# Patient Record
Sex: FEMALE | Race: WHITE | Employment: OTHER | URBAN - METROPOLITAN AREA
[De-identification: names, ages, dates, MRNs, and addresses within clinical notes are randomized per-mention and may not be internally consistent; named-entity substitution may affect disease eponyms.]

---

## 2017-02-17 DIAGNOSIS — Z12.31 VISIT FOR SCREENING MAMMOGRAM: Primary | ICD-10-CM

## 2017-02-27 DIAGNOSIS — G43.109 MIGRAINE WITH AURA AND WITHOUT STATUS MIGRAINOSUS, NOT INTRACTABLE: ICD-10-CM

## 2017-02-27 DIAGNOSIS — M15.9 PRIMARY OSTEOARTHRITIS INVOLVING MULTIPLE JOINTS: ICD-10-CM

## 2017-02-28 RX ORDER — AMITRIPTYLINE HYDROCHLORIDE 25 MG/1
TABLET, FILM COATED ORAL
Qty: 180 TABLET | Refills: 0 | OUTPATIENT
Start: 2017-02-28

## 2017-02-28 RX ORDER — TRAMADOL HYDROCHLORIDE 50 MG/1
TABLET ORAL
Qty: 120 TABLET | Refills: 0 | OUTPATIENT
Start: 2017-02-28

## 2017-07-07 DIAGNOSIS — M15.9 PRIMARY OSTEOARTHRITIS INVOLVING MULTIPLE JOINTS: ICD-10-CM

## 2017-07-10 RX ORDER — GABAPENTIN 300 MG/1
CAPSULE ORAL
Qty: 270 CAPSULE | Refills: 0 | OUTPATIENT
Start: 2017-07-10

## 2017-07-10 RX ORDER — ERGOCALCIFEROL 1.25 MG/1
CAPSULE ORAL
Qty: 4 CAPSULE | Refills: 0 | OUTPATIENT
Start: 2017-07-10

## 2017-08-21 ENCOUNTER — TELEPHONE (OUTPATIENT)
Dept: ORTHOPEDIC SURGERY | Age: 57
End: 2017-08-21

## 2017-09-29 DIAGNOSIS — M15.9 PRIMARY OSTEOARTHRITIS INVOLVING MULTIPLE JOINTS: ICD-10-CM

## 2017-09-29 RX ORDER — GABAPENTIN 300 MG/1
CAPSULE ORAL
Qty: 270 CAPSULE | Refills: 0 | OUTPATIENT
Start: 2017-09-29

## 2017-10-02 DIAGNOSIS — M15.9 PRIMARY OSTEOARTHRITIS INVOLVING MULTIPLE JOINTS: ICD-10-CM

## 2017-10-03 RX ORDER — GABAPENTIN 300 MG/1
CAPSULE ORAL
Qty: 270 CAPSULE | Refills: 0 | OUTPATIENT
Start: 2017-10-03

## 2018-01-22 ENCOUNTER — TELEPHONE (OUTPATIENT)
Dept: CASE MANAGEMENT | Age: 58
End: 2018-01-22

## 2018-08-06 ENCOUNTER — HOSPITAL ENCOUNTER (OUTPATIENT)
Dept: NEUROLOGY | Age: 58
Discharge: HOME | End: 2018-08-06
Payer: MEDICARE

## 2018-08-06 PROCEDURE — 95909 NRV CNDJ TST 5-6 STUDIES: CPT

## 2018-08-06 PROCEDURE — 95886 MUSC TEST DONE W/N TEST COMP: CPT

## 2018-08-06 NOTE — PROCEDURES
Electrodiagnostic Report  [address] TUCKER Quinonez MD, Riley Barber DO, Janace Gibney Corinda Jew, MD  Phone: [phone]  Fax: [phone]    08/06/18    Fabio Whalen  61 y.o.  1960  [id number]  Referring Provider: Kari Jiménez CNP    Clinical Problem:  61 y.o with history of low back radiating to legs left more severe than right.  Onset 3 months ago, no known injury, PMH: no endocrine disease, +left foot surgery 3-4 yrs ago , right knee surgery  PE: reflexes trace, normal strength    EMG SUMMARY TABLE RIGHT/LEFT LOWER       Spontaneous     MUAP   Recruitment    Insertional Activity Fibrillation Potential Positive Sharp Waves   Fasiculation High Frequency Potentials   Amp Duration PPP Pattern   Vastus Medialis L2-4 Femoral normal none none none none normal normal normal normal   Anterior Tibialis L4,5 Deep Peroneal normal none none none none normal normal normal normal   Gluteus Medius L4-S1 Superior Gut normal none none none none normal normal normal normal   Peroneus Longus L5-S1 Sup Peroneal normal none none none none normal normal normal normal   Posterior Tibialis L5-S1 Tibial normal none none none none normal normal normal normal   Medial Gastroc S1,2 Tibial normal none none none none normal normal normal normal   Extensor Hallicis M8-D4 Peroneal normal none none none none normal normal normal normal   Extensor Dig Brevis L5-S1 Peroneal normal none none none none normal normal normal normal   LS Paraspinals Posterior Rami       normal none none none none normal normal normal normal       Nerve Conduction Studies     Nerve Sensory Distal Latency (msec) Sensory Distal Latency (msec) Amp uv Amp uv Motor Distal Latency (msec) Motor Distal Latency (msec) Amp uv Amp uv Motor NCV (m/sec) Motor NCV (m/sec)    Right Left Right Left Right Left Right Left Right Left   Sural 3.6 (n<4.2) 3.8 (n<4.2) 8  5         Peroneal     3.2 (n<5.5) 3.1

## 2018-08-20 ENCOUNTER — HOSPITAL ENCOUNTER (OUTPATIENT)
Dept: PHYSICAL THERAPY | Age: 58
Setting detail: THERAPIES SERIES
Discharge: HOME | End: 2018-08-20
Payer: MEDICARE

## 2018-08-20 ENCOUNTER — HOSPITAL ENCOUNTER (OUTPATIENT)
Age: 58
Discharge: HOME | End: 2018-08-20
Payer: MEDICARE

## 2018-08-20 ENCOUNTER — HOSPITAL ENCOUNTER (OUTPATIENT)
Dept: GENERAL RADIOLOGY | Age: 58
Discharge: HOME | End: 2018-08-20
Payer: MEDICARE

## 2018-08-20 DIAGNOSIS — M54.16 LUMBAR RADICULOPATHY: ICD-10-CM

## 2018-08-20 PROCEDURE — 97112 NEUROMUSCULAR REEDUCATION: CPT

## 2018-08-20 PROCEDURE — G8978 MOBILITY CURRENT STATUS: HCPCS

## 2018-08-20 PROCEDURE — 97161 PT EVAL LOW COMPLEX 20 MIN: CPT

## 2018-08-20 PROCEDURE — G8979 MOBILITY GOAL STATUS: HCPCS

## 2018-08-20 PROCEDURE — 72100 X-RAY EXAM L-S SPINE 2/3 VWS: CPT

## 2018-08-20 PROCEDURE — 97110 THERAPEUTIC EXERCISES: CPT

## 2018-08-20 NOTE — FLOWSHEET NOTE
Therapeutic Exercise and NMR EXR  [x] (36137) Provided verbal/tactile cueing for activities related to strengthening, flexibility, endurance, ROM for improvements in LE, proximal hip, and core control with self care, mobility, lifting, ambulation. [x] (38432) Provided verbal/tactile cueing for activities related to improving balance, coordination, kinesthetic sense, posture, motor skill, proprioception  to assist with LE, proximal hip, and core control in self care, mobility, lifting, ambulation and eccentric single leg control. Home Exercise Program:    [x] (16731) Reviewed/Progressed HEP activities related to strengthening, flexibility, endurance, ROM of core, proximal hip and LE for functional self-care, mobility, lifting and ambulation/stair navigation   [x] (41392)Reviewed/Progressed HEP activities related to improving balance, coordination, kinesthetic sense, posture, motor skill, proprioception of core, proximal hip and LE for self care, mobility, lifting, and ambulation/stair navigation      Manual Treatments:  PROM / STM / Oscillations-Mobs:  G-I, II, III, IV (PA's, Inf., Post.)  [x] (46524) Provided manual therapy to mobilize LE, proximal hip and/or LS spine soft tissue/joints for the purpose of modulating pain, promoting relaxation,  increasing ROM, reducing/eliminating soft tissue swelling/inflammation/restriction, improving soft tissue extensibility and allowing for proper ROM for normal function with self care, mobility, lifting and ambulation.      Modalities: n/a    Charges:  Timed Code Treatment Minutes: 40   Total Treatment Minutes: 60     [x] EVAL (LOW) 99206 (typically 20 minutes face-to-face)  [] EVAL (MOD) 67731 (typically 30 minutes face-to-face)  [] EVAL (HIGH) 72989 (typically 45 minutes face-to-face)  [] RE-EVAL     [x] PD(48192) x  2   [] Ionto  [x] NMR (90760) x  1   [] Vaso  [] Manual (67797) x       [] Mech Traction  [] ES (unattended):   [] Other:     GOALS:  Short Term Goals: To be achieved in: 2 weeks  1. Independent in HEP and progression per patient tolerance, in order to prevent re-injury. 2. Patient will have a decrease in pain to facilitate improvement in movement, function, and ADLs as indicated by Functional Deficits. Long Term Goals: To be achieved in: 10 weeks  1. Disability index score of 10% or less for the Modified Oswestry Low back pain to assist with reaching prior level of function. 2. Patient will demonstrate increased AROM to equal the opposite side bilaterally to allow for proper joint functioning as indicated by patients Functional Deficits. 3. Patient will demonstrate an increase in strength of Left LE = Right LE to allow for proper functional mobility as indicated by patients Functional Deficits. 4. Patient will return to all transfers, work activities, and functional activities without increased symptoms or restriction. 5. Patient will have 0/10 pain with ADL's.  6. Patient stated goal: to relieve pain and improve sitting duration tolerance    Goals that are underlined signify the goal has been accomplished. Progression Towards Functional goals:  [] Patient is progressing as expected towards functional goals listed. [] Progression is slowed due to complexities listed. [] Progression has been slowed due to co-morbidities.   [x] Plan just implemented, too soon to assess goals progression  [] Other:     ASSESSMENT:  See eval    Treatment/Activity Tolerance:   [x] Patient tolerated treatment well [] Patient limited by fatique  [] Patient limited by pain  [] Patient limited by other medical complications  [] Other:     Prognosis: [x] Good [] Fair  [] Poor    Patient Requires Follow-up: [x] Yes  [] No    PLAN: See eval  [] Continue per plan of care [] Alter current plan (see comments)  [x] Plan of care initiated [] Hold pending MD visit [] Discharge    Electronically signed by: Kem Shepherd PT

## 2018-08-20 NOTE — PLAN OF CARE
32%  Functional Limitation: Mobility: Walking and moving around  Mobility: Walking and Moving Around Current Status (): At least 20 percent but less than 40 percent impaired, limited or restricted  Mobility: Walking and Moving Around Goal Status (): At least 1 percent but less than 20 percent impaired, limited or restricted    Pain Scale: 4-5/10  Easing factors: pressure on PSIS area   Provocative factors: one position for too long of time     Type: []Constant   [x]Intermittent  []Radiating []Localized []other:     Numbness/Tingling: n/a    Functional Limitations/Impairments: [x]Sitting [x]Standing [x]Walking    [x]Squatting/bending  [x]Stairs           [x]ADL's  []Transfers []Sports/Recreation []Other:    Occupation/School:  Disabled secondary to Left Ankle     Living Status/Prior Level of Function: Independent with ADLs and IADLs.     OBJECTIVE:                   Supine Exam Normal Abnormal N/A Comments   Hip flexion       Abduction       ER       IR       ISAAC/James       Hip scour       SLR       Crossed SLR       Supine to sit       SI distraction/compression       Hip thrust                     Prone Exam Normal Abnormal N/A Comments   Prone knee bend       Prone hip IR       B Achilles reflex/Pheasant       PA/Spring       Prone Instability test       Sacral Spring/thrust                       ROM LEFT RIGHT Comments   Lumbar Flex 55 °      Lumbar Ext 18 °      Side Bend R 16 ° L 18 °      Rotation                    ROM LEFT RIGHT Comments   Hip Flexion 55 °  60 °     Hip Abd      Hip ER      Hip IR      Hip Extension      Knee Ext      Knee Flex      Hamstring Flex      Piriformis                    Strength LEFT RIGHT Comments   Multifidus 2-/5 2-/5    Transverse Ab 2-/5 2-/5    Hip Flexors 3+/5 4-/5    Hip Abductors 3+/5 4-/5    Hip Extensors 3+/5 4-/5                   Neural dynamic tension testing Normal Abnormal Comments   Slump Test  - Degree of knee flexion:       SLR  x     0-30 x []profession/work barriers  []past PT/medical experience  []other:  Justification:     Falls Risk Assessment (30 days):  [] Falls Risk assessed and no intervention required. [x] Falls Risk assessed and Patient requires intervention due to being higher risk   TUG score (>12s at risk):     [x] Falls education provided, including       G-Codes:  PT G-Codes  Functional Assessment Tool Used: Modified Oswestry Low Back  Score: 32%  Functional Limitation: Mobility: Walking and moving around  Mobility: Walking and Moving Around Current Status (): At least 20 percent but less than 40 percent impaired, limited or restricted  Mobility: Walking and Moving Around Goal Status ():  At least 1 percent but less than 20 percent impaired, limited or restricted    ASSESSMENT:   Functional Impairments:     []Noted lumbar/proximal hip hypomobility   []Noted lumbosacral and/or generalized hypermobility   [x]Decreased Lumbosacral/hip/LE functional ROM   [x]Decreased core/proximal hip strength and neuromuscular control    [x]Decreased LE functional strength    []Abnormal reflexes/sensation/myotomal/dermatomal deficits  [x]Reduced balance/proprioceptive control    []other:      Functional Activity Limitations (from functional questionnaire and intake)   [x]Reduced ability to tolerate prolonged functional positions   [x]Reduced ability or difficulty with changes of positions or transfers between positions   [x]Reduced ability to maintain good posture and demonstrate good body mechanics with sitting, bending, and lifting   [x]Reduced ability to sleep   [x] Reduced ability or tolerance with driving and/or computer work   [x]Reduced ability to perform lifting, reaching, carrying tasks   [x]Reduced ability to squat   [x]Reduced ability to forward bend   [x]Reduced ability to ambulate prolonged functional periods/distances/surfaces   [x]Reduced ability to ascend/descend stairs   []other:       Participation Restrictions   [x]Reduced

## 2018-08-22 ENCOUNTER — HOSPITAL ENCOUNTER (OUTPATIENT)
Dept: PHYSICAL THERAPY | Age: 58
Setting detail: THERAPIES SERIES
Discharge: HOME | End: 2018-08-22
Payer: MEDICARE

## 2018-08-22 PROCEDURE — 97112 NEUROMUSCULAR REEDUCATION: CPT | Performed by: PHYSICAL THERAPY ASSISTANT

## 2018-08-22 PROCEDURE — 97110 THERAPEUTIC EXERCISES: CPT | Performed by: PHYSICAL THERAPY ASSISTANT

## 2018-08-22 NOTE — FLOWSHEET NOTE
[facility] [facility]    Physical Therapy Daily Treatment Note  Date:  2018    Patient Name:  Kuldeep Carlisle    :  1960  MRN: [id number]  Medical/Treatment Diagnosis Information:  · Diagnosis: Lumbar Radiculopathy  · Treatment Diagnosis: H65.73  Insurance/Certification information:  [facility]  Physician Information:  Referring Practitioner: Desiree Harrison of care signed (Y/N):     Date of Patient follow up with Physician:     G-Code (if applicable):      Date G-Code Applied:  2018       Progress Note: [x]  Yes  []  No      Latex Allergy:  [x]NO      []YES    Preferred Language for Healthcare:   [x]English       []other:    Visit # Insurance Allowable   2 30 visits     Pain level:  4-5/10     SUBJECTIVE:  Patient reports that she did not do her exercises any since last visit.     OBJECTIVE: See eval  Observation:   Test measurements:    Patient educated on following:    RESTRICTIONS/PRECAUTIONS:     Exercises/Interventions:   Therapeutic Ex Wt/Sets/Reps/Hold Notes   Bike x5'    Eliptical          Slant Board 3x30\"    HR  x30         PPT 10x10\"    HL Lumbar Rotation 5\" x10    Supine Hamstring Stretch 3 x 20\"    Supine Piriformis Stretch 3 x 30\"    SL IT Band Stretch  3 x 30    Prone prop To elbows 10x10\"    Longsitting HS stretch 3x30\" R, L         Supine marches x20 R, L    HL Clamshells RTB     SL Clamshells GTB x20 R, L    SLR 2x10 R, L                                  Manual     Gr I-II mobs for tissue reactivity     PROM-all planes     GR III-IV mobs for arthrokinematics     Lumbar/long axis distraction     Thoracic PA mobs     PNF for strengthening     PNF for agonist/antagonist inhibition          Pt education/reminders 5' Pt education with HEP     Therapeutic Exercise and NMR EXR  [x] (41778) Provided verbal/tactile cueing for activities related to strengthening, flexibility, endurance, ROM for improvements in LE, proximal hip, and core control with self care, mobility, lifting, ambulation. [x] (75541) Provided verbal/tactile cueing for activities related to improving balance, coordination, kinesthetic sense, posture, motor skill, proprioception  to assist with LE, proximal hip, and core control in self care, mobility, lifting, ambulation and eccentric single leg control. Home Exercise Program:    [x] (32340) Reviewed/Progressed HEP activities related to strengthening, flexibility, endurance, ROM of core, proximal hip and LE for functional self-care, mobility, lifting and ambulation/stair navigation   [x] (12446)Reviewed/Progressed HEP activities related to improving balance, coordination, kinesthetic sense, posture, motor skill, proprioception of core, proximal hip and LE for self care, mobility, lifting, and ambulation/stair navigation      Manual Treatments:  PROM / STM / Oscillations-Mobs:  G-I, II, III, IV (PA's, Inf., Post.)  [x] (86595) Provided manual therapy to mobilize LE, proximal hip and/or LS spine soft tissue/joints for the purpose of modulating pain, promoting relaxation,  increasing ROM, reducing/eliminating soft tissue swelling/inflammation/restriction, improving soft tissue extensibility and allowing for proper ROM for normal function with self care, mobility, lifting and ambulation. Modalities: n/a    Charges:  Timed Code Treatment Minutes: 54'   Total Treatment Minutes: 54'     [] EVAL (LOW) 52763 (typically 20 minutes face-to-face)  [] EVAL (MOD) 83120 (typically 30 minutes face-to-face)  [] EVAL (HIGH) 90134 (typically 45 minutes face-to-face)  [] RE-EVAL     [x] AH(69627) x  2   [] Ionto  [x] NMR (82791) x  1   [] Vaso  [] Manual (11445) x       [] Mech Traction  [] ES (unattended):   [] Other:     GOALS:  Short Term Goals: To be achieved in: 2 weeks  1. Independent in HEP and progression per patient tolerance, in order to prevent re-injury.    2. Patient will have a decrease in pain to

## 2018-08-28 ENCOUNTER — HOSPITAL ENCOUNTER (OUTPATIENT)
Dept: PHYSICAL THERAPY | Age: 58
Setting detail: THERAPIES SERIES
Discharge: HOME | End: 2018-08-28
Payer: MEDICARE

## 2018-08-28 PROCEDURE — 97110 THERAPEUTIC EXERCISES: CPT

## 2018-08-28 PROCEDURE — 97112 NEUROMUSCULAR REEDUCATION: CPT

## 2018-08-28 NOTE — FLOWSHEET NOTE
[address] [facility]    Physical [facility] Daily Treatment Note  Date:  2018    Patient Name:  Malia Dewitt    :  1960  MRN: [id number]  Medical/Treatment Diagnosis Information:  · Diagnosis: Lumbar Radiculopathy  · Treatment Diagnosis: X84.96  Insurance/Certification information:  PT Insurance Information: [place]  Physician Information:  Referring Practitioner: Evelyn Celeste of care signed (Y/N):     Date of Patient follow up with Physician:     G-Code (if applicable):      Date G-Code Applied:  2018       Progress Note: [x]  Yes  []  No      Latex Allergy:  [x]NO      []YES    Preferred Language for Healthcare:   [x]English       []other:    Visit # Insurance Allowable   3 30 visits     Pain level:  8/10 while driving to therapy; - now     SUBJECTIVE:  Patient reports she has been doing some of her exercises.      OBJECTIVE: See eval  Observation:   Test measurements:    Patient educated on following:    RESTRICTIONS/PRECAUTIONS:     Exercises/Interventions:   Therapeutic Ex Wt/Sets/Reps/Hold Notes   Bike x5'    Eliptical          Slant Board 3x30\"    HR  x30         PPT 10x10\"    HL Lumbar Rotation 5\" x10    Supine Hamstring Stretch 3 x 20\"    Supine Piriformis Stretch 3 x 30\"    SL IT Band Stretch  3 x 30    Prone prop To elbows 10x10\"    Longsitting HS stretch 3x30\" R, L         Supine marches x20 R, L    HL Clamshells RTB     SL Clamshells GTB x20 R, L    SLR 2x10 R, L                                  Manual     Gr I-II mobs for tissue reactivity     PROM-all planes     GR III-IV mobs for arthrokinematics     Lumbar/long axis distraction     Thoracic PA mobs     PNF for strengthening     PNF for agonist/antagonist inhibition          Pt education/reminders 5' Pt education with HEP     Therapeutic Exercise and NMR EXR  [x] () Provided verbal/tactile cueing for activities related to strengthening, flexibility, endurance, ROM for

## 2018-08-29 ENCOUNTER — HOSPITAL ENCOUNTER (OUTPATIENT)
Dept: PHYSICAL THERAPY | Age: 58
Setting detail: THERAPIES SERIES
Discharge: HOME | End: 2018-08-29
Payer: MEDICARE

## 2018-08-29 PROCEDURE — 97112 NEUROMUSCULAR REEDUCATION: CPT | Performed by: PHYSICAL THERAPY ASSISTANT

## 2018-08-29 PROCEDURE — 97140 MANUAL THERAPY 1/> REGIONS: CPT | Performed by: PHYSICAL THERAPY ASSISTANT

## 2018-08-29 PROCEDURE — 97110 THERAPEUTIC EXERCISES: CPT | Performed by: PHYSICAL THERAPY ASSISTANT

## 2018-08-29 NOTE — FLOWSHEET NOTE
[facility] [facility]    Physical Therapy Daily Treatment Note  Date:  2018    Patient Name:  Luis Mary    :  1960  MRN: [id number]  Medical/Treatment Diagnosis Information:  · Diagnosis: Lumbar Radiculopathy  · Treatment Diagnosis: N93.07  Insurance/Certification information:  PT [facility]  Physician Information:  Referring Practitioner: Ancelmo Jj of care signed (Y/N):     Date of Patient follow up with Physician:     G-Code (if applicable):      Date G-Code Applied:  2018       Progress Note: [x]  Yes  []  No      Latex Allergy:  [x]NO      []YES    Preferred Language for Healthcare:   [x]English       []other:    Visit # Insurance Allowable   4 30 visits     Pain level:  8/10 while driving to therapy; 0- now     SUBJECTIVE:  Patients chief complaint continues to be prolonged sitting in her recliner and while driving. Patient also admits that she has been doing increased oneyda activity and standing more - finds relief with lumbar extension and prone props.     OBJECTIVE: See eval  Observation:   Test measurements:    Patient educated on following:    RESTRICTIONS/PRECAUTIONS:     Exercises/Interventions:   Therapeutic Ex Wt/Sets/Reps/Hold Notes   Bike x5'    Eliptical          Slant Board 3x30\"    HR  x30         PPT 10x10\"    HL Lumbar Rotation 5\" x15    Supine Hamstring Stretch 3 x 20\"    Supine Piriformis Stretch 5x20\"    SL IT Band Stretch  3 x 30    Prone prop To elbows 10x10\"    Longsitting HS stretch 3x30\" R, L         Supine marches x20 R, L    HL Clamshells RTB     SL Clamshells GTB x20 R, L    SLR 2x10 R, L                                  Manual     Gr I-II mobs for tissue reactivity     PROM-all planes     GR III-IV mobs for arthrokinematics     Lumbar/long axis distraction 10x10\" HL position   Thoracic PA mobs     PNF for strengthening     PNF for agonist/antagonist inhibition          Pt education/reminders 5' Pt education with HEP     Therapeutic Exercise and NMR EXR  [x] (66721) Provided verbal/tactile cueing for activities related to strengthening, flexibility, endurance, ROM for improvements in LE, proximal hip, and core control with self care, mobility, lifting, ambulation. [x] (19552) Provided verbal/tactile cueing for activities related to improving balance, coordination, kinesthetic sense, posture, motor skill, proprioception  to assist with LE, proximal hip, and core control in self care, mobility, lifting, ambulation and eccentric single leg control. Home Exercise Program:    [x] (73531) Reviewed/Progressed HEP activities related to strengthening, flexibility, endurance, ROM of core, proximal hip and LE for functional self-care, mobility, lifting and ambulation/stair navigation   [x] (34921)Reviewed/Progressed HEP activities related to improving balance, coordination, kinesthetic sense, posture, motor skill, proprioception of core, proximal hip and LE for self care, mobility, lifting, and ambulation/stair navigation      Manual Treatments:  PROM / STM / Oscillations-Mobs:  G-I, II, III, IV (PA's, Inf., Post.)  [x] (19712) Provided manual therapy to mobilize LE, proximal hip and/or LS spine soft tissue/joints for the purpose of modulating pain, promoting relaxation,  increasing ROM, reducing/eliminating soft tissue swelling/inflammation/restriction, improving soft tissue extensibility and allowing for proper ROM for normal function with self care, mobility, lifting and ambulation.      Modalities: n/a    Charges:  Timed Code Treatment Minutes: 54'   Total Treatment Minutes: 54'     [] EVAL (LOW) 22343 (typically 20 minutes face-to-face)  [] EVAL (MOD) 12904 (typically 30 minutes face-to-face)  [] EVAL (HIGH) 54338 (typically 45 minutes face-to-face)  [] RE-EVAL     [x] UL(81391) x  1   [] Ionto  [x] NMR (42848) x  1   [] Vaso  [x] Manual (11179) x  1    [] Mech Traction  [] ES (unattended):   [] Other:     GOALS:  Short Term Goals: To be achieved in: 2 weeks  1. Independent in HEP and progression per patient tolerance, in order to prevent re-injury. 2. Patient will have a decrease in pain to facilitate improvement in movement, function, and ADLs as indicated by Functional Deficits. Long Term Goals: To be achieved in: 10 weeks  1. Disability index score of 10% or less for the Modified Oswestry Low back pain to assist with reaching prior level of function. 2. Patient will demonstrate increased AROM to equal the opposite side bilaterally to allow for proper joint functioning as indicated by patients Functional Deficits. 3. Patient will demonstrate an increase in strength of Left LE = Right LE to allow for proper functional mobility as indicated by patients Functional Deficits. 4. Patient will return to all transfers, work activities, and functional activities without increased symptoms or restriction. 5. Patient will have 0/10 pain with ADL's.  6. Patient stated goal: to relieve pain and improve sitting duration tolerance    Goals that are underlined signify the goal has been accomplished. Progression Towards Functional goals:  [x] Patient is progressing as expected towards functional goals listed. [] Progression is slowed due to complexities listed. [] Progression has been slowed due to co-morbidities.   [] Plan just implemented, too soon to assess goals progression  [] Other:     ASSESSMENT:  See eval    Treatment/Activity Tolerance:   [x] Patient tolerated treatment well [] Patient limited by fatique  [] Patient limited by pain  [] Patient limited by other medical complications  [] Other:     Prognosis: [x] Good [] Fair  [] Poor    Patient Requires Follow-up: [x] Yes  [] No    PLAN: See eval  [x] Continue per plan of care [] Alter current plan (see comments)  [] Plan of care initiated [] Hold pending MD visit [] Discharge    Electronically signed by: Mariela Urias PTA

## 2018-09-05 ENCOUNTER — HOSPITAL ENCOUNTER (OUTPATIENT)
Dept: PHYSICAL THERAPY | Age: 58
Setting detail: THERAPIES SERIES
Discharge: HOME | End: 2018-09-05
Payer: MEDICARE

## 2018-09-05 PROCEDURE — 97140 MANUAL THERAPY 1/> REGIONS: CPT

## 2018-09-05 PROCEDURE — 97112 NEUROMUSCULAR REEDUCATION: CPT

## 2018-09-05 PROCEDURE — 97110 THERAPEUTIC EXERCISES: CPT

## 2018-09-05 NOTE — FLOWSHEET NOTE
strengthening, flexibility, endurance, ROM for improvements in LE, proximal hip, and core control with self care, mobility, lifting, ambulation. [x] (66660) Provided verbal/tactile cueing for activities related to improving balance, coordination, kinesthetic sense, posture, motor skill, proprioception  to assist with LE, proximal hip, and core control in self care, mobility, lifting, ambulation and eccentric single leg control. Home Exercise Program:    [x] (78891) Reviewed/Progressed HEP activities related to strengthening, flexibility, endurance, ROM of core, proximal hip and LE for functional self-care, mobility, lifting and ambulation/stair navigation   [x] (18043)Reviewed/Progressed HEP activities related to improving balance, coordination, kinesthetic sense, posture, motor skill, proprioception of core, proximal hip and LE for self care, mobility, lifting, and ambulation/stair navigation      Manual Treatments:  PROM / STM / Oscillations-Mobs:  G-I, II, III, IV (PA's, Inf., Post.)  [x] (00039) Provided manual therapy to mobilize LE, proximal hip and/or LS spine soft tissue/joints for the purpose of modulating pain, promoting relaxation,  increasing ROM, reducing/eliminating soft tissue swelling/inflammation/restriction, improving soft tissue extensibility and allowing for proper ROM for normal function with self care, mobility, lifting and ambulation. Modalities: n/a    Charges:  Timed Code Treatment Minutes: 40'   Total Treatment Minutes: 54'     [] EVAL (LOW) 43830 (typically 20 minutes face-to-face)  [] EVAL (MOD) 83985 (typically 30 minutes face-to-face)  [] EVAL (HIGH) 58516 (typically 45 minutes face-to-face)  [] RE-EVAL     [x] OU(26529) x  1   [] Ionto  [x] NMR (30905) x  1   [] Vaso  [x] Manual (82954) x  1    [] Mech Traction  [] ES (unattended):   [] Other:     GOALS:  Short Term Goals: To be achieved in: 2 weeks  1.  Independent in HEP and progression per patient tolerance, in order to

## 2018-09-10 ENCOUNTER — HOSPITAL ENCOUNTER (OUTPATIENT)
Dept: PHYSICAL THERAPY | Age: 58
Setting detail: THERAPIES SERIES
Discharge: HOME | End: 2018-09-10
Payer: MEDICARE

## 2018-09-10 PROCEDURE — 97140 MANUAL THERAPY 1/> REGIONS: CPT | Performed by: PHYSICAL THERAPY ASSISTANT

## 2018-09-10 PROCEDURE — 97110 THERAPEUTIC EXERCISES: CPT | Performed by: PHYSICAL THERAPY ASSISTANT

## 2018-09-10 PROCEDURE — 97112 NEUROMUSCULAR REEDUCATION: CPT | Performed by: PHYSICAL THERAPY ASSISTANT

## 2018-09-12 ENCOUNTER — HOSPITAL ENCOUNTER (OUTPATIENT)
Dept: PHYSICAL THERAPY | Age: 58
Setting detail: THERAPIES SERIES
Discharge: HOME | End: 2018-09-12
Payer: MEDICARE

## 2018-09-12 PROCEDURE — G8978 MOBILITY CURRENT STATUS: HCPCS | Performed by: PHYSICAL THERAPY ASSISTANT

## 2018-09-12 PROCEDURE — 97112 NEUROMUSCULAR REEDUCATION: CPT | Performed by: PHYSICAL THERAPY ASSISTANT

## 2018-09-12 PROCEDURE — 97110 THERAPEUTIC EXERCISES: CPT | Performed by: PHYSICAL THERAPY ASSISTANT

## 2018-09-12 PROCEDURE — G8979 MOBILITY GOAL STATUS: HCPCS | Performed by: PHYSICAL THERAPY ASSISTANT

## 2018-09-12 PROCEDURE — 97140 MANUAL THERAPY 1/> REGIONS: CPT | Performed by: PHYSICAL THERAPY ASSISTANT

## 2018-09-17 ENCOUNTER — HOSPITAL ENCOUNTER (OUTPATIENT)
Dept: PHYSICAL THERAPY | Age: 58
Setting detail: THERAPIES SERIES
Discharge: HOME | End: 2018-09-17
Payer: MEDICARE

## 2018-09-17 PROCEDURE — 97112 NEUROMUSCULAR REEDUCATION: CPT

## 2018-09-17 PROCEDURE — 97110 THERAPEUTIC EXERCISES: CPT

## 2018-09-17 NOTE — FLOWSHEET NOTE
agonist/antagonist inhibition          Pt education/reminders 5' Pt education with HEP     Therapeutic Exercise and NMR EXR  [x] (93846) Provided verbal/tactile cueing for activities related to strengthening, flexibility, endurance, ROM for improvements in LE, proximal hip, and core control with self care, mobility, lifting, ambulation. [x] (12309) Provided verbal/tactile cueing for activities related to improving balance, coordination, kinesthetic sense, posture, motor skill, proprioception  to assist with LE, proximal hip, and core control in self care, mobility, lifting, ambulation and eccentric single leg control. Home Exercise Program:    [x] (92082) Reviewed/Progressed HEP activities related to strengthening, flexibility, endurance, ROM of core, proximal hip and LE for functional self-care, mobility, lifting and ambulation/stair navigation   [x] (77150)Reviewed/Progressed HEP activities related to improving balance, coordination, kinesthetic sense, posture, motor skill, proprioception of core, proximal hip and LE for self care, mobility, lifting, and ambulation/stair navigation      Manual Treatments:  PROM / STM / Oscillations-Mobs:  G-I, II, III, IV (PA's, Inf., Post.)  [x] (38625) Provided manual therapy to mobilize LE, proximal hip and/or LS spine soft tissue/joints for the purpose of modulating pain, promoting relaxation,  increasing ROM, reducing/eliminating soft tissue swelling/inflammation/restriction, improving soft tissue extensibility and allowing for proper ROM for normal function with self care, mobility, lifting and ambulation.      Modalities: n/a    Charges:  Timed Code Treatment Minutes: 40'   Total Treatment Minutes: 54'     [] EVAL (LOW) 87558 (typically 20 minutes face-to-face)  [] EVAL (MOD) 48304 (typically 30 minutes face-to-face)  [] EVAL (HIGH) 28418 (typically 45 minutes face-to-face)  [] RE-EVAL     [x] ZL(95904) x  2   [] Ionto  [x] NMR (84327) x  1   [] Vaso  [] Manual (17213)

## 2018-09-19 ENCOUNTER — HOSPITAL ENCOUNTER (OUTPATIENT)
Dept: PHYSICAL THERAPY | Age: 58
Setting detail: THERAPIES SERIES
Discharge: HOME | End: 2018-09-19
Payer: MEDICARE

## 2018-09-19 PROCEDURE — 97110 THERAPEUTIC EXERCISES: CPT | Performed by: PHYSICAL THERAPY ASSISTANT

## 2018-09-19 PROCEDURE — 97140 MANUAL THERAPY 1/> REGIONS: CPT | Performed by: PHYSICAL THERAPY ASSISTANT

## 2018-09-19 PROCEDURE — 97112 NEUROMUSCULAR REEDUCATION: CPT | Performed by: PHYSICAL THERAPY ASSISTANT

## 2018-09-19 NOTE — FLOWSHEET NOTE
[address] [facility])    Physical Therapy Daily Treatment Note  Date:  2018    Patient Name:  Shirley Merritt    :  1960  MRN: [id number]  Medical/Treatment Diagnosis Information:  · Diagnosis: Lumbar Radiculopathy  · Treatment Diagnosis: V86.10  Insurance/Certification information:  [facility] [facility]  Physician Information:  Referring Practitioner: Yas Fret of care signed (Y/N):     Date of Patient follow up with Physician:     G-Code (if applicable):      Date G-Code Applied:  2018       Progress Note: [x]  Yes  []  No      Latex Allergy:  [x]NO      []YES    Preferred Language for Healthcare:   [x]English       []other:    Visit # Insurance Allowable   9 30 visits     Pain level:  /10 current; 7-8/10 over the weekend     SUBJECTIVE:  Patient reports that she has been really busy over last few days and unable to perform her HEP. Still complains of back pain with sitting and driving.      OBJECTIVE:   Observation:   Test measurements:    Patient educated on following:    RESTRICTIONS/PRECAUTIONS:     Exercises/Interventions:   Therapeutic Ex Wt/Sets/Reps/Hold Notes   Bike x5'    Eliptical          Slant Board 3x30\"    HR  x30    Standing marches 3# x20 R, L    Leg Press 60# x30    KAPIL abd 45# x20 R, L    FSU 4\" x20 R, L              PPT 10x10\"    HL Lumbar Rotation 5\" x15    Supine Hamstring Stretch 3 x 20\"    Supine Piriformis Stretch 3 x 20\"    SL IT Band Stretch  3 x 30\"    Prone prop To hands 10x10\"    Longsitting HS stretch 3x30\" R, L         Supine marches 2# x20 R, L    HL Clamshells 3 way BTB  x20 ea    SL Clamshells BTB x20 R, L    SLR with PPT 3x10 R, L         LAQ with ADD squeeze 3# 2x15 R, L                        Manual     Gr I-II mobs for tissue reactivity     PROM-all planes     GR III-IV mobs for arthrokinematics     Prone SI mobs/sacral distraction  Prone quad stretch x8'    3x30\"    Lumbar/long axis RE-EVAL     [x] RV(88738) x  1   [] Ionto  [x] NMR (79302) x  1   [] Vaso  [x] Manual (34023) x  1    [] Mech Traction  [] ES (unattended):   [] Other:     GOALS:  Short Term Goals: To be achieved in: 2 weeks  1. Independent in HEP and progression per patient tolerance, in order to prevent re-injury. 2. Patient will have a decrease in pain to facilitate improvement in movement, function, and ADLs as indicated by Functional Deficits. Long Term Goals: To be achieved in: 10 weeks  1. Disability index score of 10% or less for the Modified Oswestry Low back pain to assist with reaching prior level of function. 2. Patient will demonstrate increased AROM to equal the opposite side bilaterally to allow for proper joint functioning as indicated by patients Functional Deficits. 3. Patient will demonstrate an increase in strength of Left LE = Right LE to allow for proper functional mobility as indicated by patients Functional Deficits. 4. Patient will return to all transfers, work activities, and functional activities without increased symptoms or restriction. 5. Patient will have 0/10 pain with ADL's.  6. Patient stated goal: to relieve pain and improve sitting duration tolerance    Goals that are underlined signify the goal has been accomplished. Progression Towards Functional goals:  [x] Patient is progressing as expected towards functional goals listed. [] Progression is slowed due to complexities listed. [] Progression has been slowed due to co-morbidities.   [] Plan just implemented, too soon to assess goals progression  [] Other:     ASSESSMENT:  See eval    Treatment/Activity Tolerance:   [x] Patient tolerated treatment well [] Patient limited by fatique  [] Patient limited by pain  [] Patient limited by other medical complications  [] Other:     Prognosis: [x] Good [] Fair  [] Poor    Patient Requires Follow-up: [x] Yes  [] No    PLAN: See eval  [x] Continue per plan of care [] Alter current plan

## 2018-09-19 NOTE — FLOWSHEET NOTE
[address], [address] [facility], [address] Rohan Obrien  Phone: [phone]  Fax [phone]      ATHLETIC TRAINING AREA ACTIVITY SHEET  Date:  2018    Patient Name:  Luis Mary    :  1960  MRN: [id number]  Restrictions/Precautions:    Medical/Treatment Diagnosis Information:  ·   Diagnosis: Lumbar Radiculopathy  · Treatment Diagnosis: M54.16  ·    Physician Information:   Referring Practitioner: Josh Jones Post-op  8 wks  12 wks 16 wks 20 wks   24 wks                            Activity Log                                                  DOS/DOI:                                                    Date:     ATC communication Goes to see the Dr on Oct 15th. Bike    Elliptical    Treadmill    Airdyne        Gastroc stretch    Soleus stretch    Hamstring stretch    ITB stretch    Hip Flexor stretch    Quad stretch    Adductor stretch        Weight Shifting sp                              fp                              tp    Lateral walking (with/w/o TB)        Balance: PEP/Lisa board                   SLS          Star excursion load/explode          Extremity reach UE/LE        Leg Press Eddie. 80# x30                     Ecc.                      Inv. Calf Press Eddie. Ecc.                        Inv.        KAPIL   Flex               ABd 45# x 30 R,L              ADd              TKE               Ext        Steps Up 4\" x 30 R,L               Up and Over               Down               Lateral               Rotation        Squats  mini                  wall                 BOS[facility]         Lunges:  Lunge to Balance                   Balance to Lunge                   Walking        Knee Extension Bilat. Ecc.                               Inv. Hamstring Curls Bilat.                                Ecc.                               Inv.        Soleus Press

## 2018-09-25 ENCOUNTER — HOSPITAL ENCOUNTER (OUTPATIENT)
Dept: PHYSICAL THERAPY | Age: 58
Setting detail: THERAPIES SERIES
Discharge: HOME | End: 2018-09-25
Payer: MEDICARE

## 2018-09-25 PROCEDURE — 97110 THERAPEUTIC EXERCISES: CPT

## 2018-09-25 PROCEDURE — G8979 MOBILITY GOAL STATUS: HCPCS

## 2018-09-25 PROCEDURE — 97112 NEUROMUSCULAR REEDUCATION: CPT

## 2018-09-25 PROCEDURE — G8978 MOBILITY CURRENT STATUS: HCPCS

## 2018-09-25 NOTE — FLOWSHEET NOTE
lifting and ambulation. Modalities: n/a    Charges:  Timed Code Treatment Minutes: 61'   Total Treatment Minutes: 61'     [] EVAL (LOW) 64280 (typically 20 minutes face-to-face)  [] EVAL (MOD) 28572 (typically 30 minutes face-to-face)  [] EVAL (HIGH) 84310 (typically 45 minutes face-to-face)  [] RE-EVAL     [x] DZ(65959) x  2   [] Ionto  [x] NMR (44658) x  2   [] Vaso  [] Manual (63619) x       [] Mech Traction  [] ES (unattended):   [] Other:     GOALS:  Short Term Goals: To be achieved in: 2 weeks  1. Independent in HEP and progression per patient tolerance, in order to prevent re-injury. 2. Patient will have a decrease in pain to facilitate improvement in movement, function, and ADLs as indicated by Functional Deficits. Long Term Goals: To be achieved in: 10 weeks  1. Disability index score of 10% or less for the Modified Oswestry Low back pain to assist with reaching prior level of function. 2. Patient will demonstrate increased AROM to equal the opposite side bilaterally to allow for proper joint functioning as indicated by patients Functional Deficits. 3. Patient will demonstrate an increase in strength of Left LE = Right LE to allow for proper functional mobility as indicated by patients Functional Deficits. 4. Patient will return to all transfers, work activities, and functional activities without increased symptoms or restriction. 5. Patient will have 0/10 pain with ADL's.  6. Patient stated goal: to relieve pain and improve sitting duration tolerance    Goals that are underlined signify the goal has been accomplished. Progression Towards Functional goals:  [x] Patient is progressing as expected towards functional goals listed. [] Progression is slowed due to complexities listed. [] Progression has been slowed due to co-morbidities.   [] Plan just implemented, too soon to assess goals progression  [] Other:     ASSESSMENT:  See eval    Treatment/Activity Tolerance:   [x] Patient

## 2018-09-27 ENCOUNTER — HOSPITAL ENCOUNTER (OUTPATIENT)
Dept: PHYSICAL THERAPY | Age: 58
Setting detail: THERAPIES SERIES
Discharge: HOME | End: 2018-09-27
Payer: MEDICARE

## 2018-09-27 PROCEDURE — 97110 THERAPEUTIC EXERCISES: CPT | Performed by: PHYSICAL THERAPY ASSISTANT

## 2018-09-27 PROCEDURE — 97112 NEUROMUSCULAR REEDUCATION: CPT | Performed by: PHYSICAL THERAPY ASSISTANT

## 2018-09-27 NOTE — FLOWSHEET NOTE
[address] [facility]    Physical Therapy Daily Treatment Note  Date:  2018    Patient Name:  Ruby Simpson    :  1960  MRN: [id number]  Medical/Treatment Diagnosis Information:  · Diagnosis: Lumbar Radiculopathy  · Treatment Diagnosis: A90.23  Insurance/Certification information:  PT Insurance Information: [facility]  Physician Information:  Referring Practitioner: Amauri Caceres of care signed (Y/N):     Date of Patient follow up with Physician:     G-Code (if applicable):      Date G-Code Applied:  2018       Progress Note: [x]  Yes  []  No      Latex Allergy:  [x]NO      []YES    Preferred Language for Healthcare:   [x]English       []other:    Visit # Insurance Allowable   11 30 visits     Pain level:  1-2/10     SUBJECTIVE:  \"It's better at times\"      OBJECTIVE:   Observation:   Test measurements:    Patient educated on following:    RESTRICTIONS/PRECAUTIONS:     Exercises/Interventions:   Therapeutic Ex Wt/Sets/Reps/Hold Notes   Bike x5' L4   Eliptical          3x30\"   HR  x30    Standing marches 3# x30 R, L    Leg Press 60# x30    KAPIL abd 45# x30 R, L    FSU 4\" x20 R, L    Hamstring Curl 30# x20         PPT 10x10\"    HL Lumbar Rotation 5\" x15    Supine Hamstring Stretch 3 x 20\"    Supine Piriformis Stretch 3 x 20\"    SL IT Band Stretch  3 x 30\"    Prone prop To hands 10x10\"    Longsitting HS stretch 3x30\" R, L         Supine marches 3# x30 R, L    HL Clamshells 3 way BTB  x20 ea    SL Clamshells BTB x20 R, L    SLR with PPT 2# 3x10 R, L    Bridge x20    LAQ with ADD squeeze 3# 2x15 R, L    SAQ 3# x30 R, L                   Manual     Gr I-II mobs for tissue reactivity     PROM-all planes     GR III-IV mobs for arthrokinematics     Prone SI mobs/sacral distraction  Prone quad stretch         Lumbar/long axis distraction 10x10\" HL position   Thoracic PA mobs     PNF for strengthening     PNF for agonist/antagonist inhibition          Pt

## 2018-10-01 ENCOUNTER — APPOINTMENT (OUTPATIENT)
Dept: PHYSICAL THERAPY | Age: 58
End: 2018-10-01
Payer: MEDICARE

## 2018-10-03 ENCOUNTER — HOSPITAL ENCOUNTER (OUTPATIENT)
Dept: PHYSICAL THERAPY | Age: 58
Setting detail: THERAPIES SERIES
Discharge: HOME | End: 2018-10-03
Payer: MEDICARE

## 2018-10-03 PROCEDURE — 97110 THERAPEUTIC EXERCISES: CPT | Performed by: PHYSICAL THERAPY ASSISTANT

## 2018-10-03 PROCEDURE — G8978 MOBILITY CURRENT STATUS: HCPCS | Performed by: PHYSICAL THERAPY ASSISTANT

## 2018-10-03 PROCEDURE — G8980 MOBILITY D/C STATUS: HCPCS | Performed by: PHYSICAL THERAPY ASSISTANT

## 2018-10-03 PROCEDURE — G8979 MOBILITY GOAL STATUS: HCPCS | Performed by: PHYSICAL THERAPY ASSISTANT

## 2018-10-03 PROCEDURE — 97112 NEUROMUSCULAR REEDUCATION: CPT | Performed by: PHYSICAL THERAPY ASSISTANT

## 2018-10-03 NOTE — FLOWSHEET NOTE
signify the goal has been accomplished. Progression Towards Functional goals:  [x] Patient is progressing as expected towards functional goals listed. [] Progression is slowed due to complexities listed. [] Progression has been slowed due to co-morbidities.   [] Plan just implemented, too soon to assess goals progression  [] Other:     ASSESSMENT:  See eval    Treatment/Activity Tolerance:   [x] Patient tolerated treatment well [] Patient limited by fatique  [] Patient limited by pain  [] Patient limited by other medical complications  [] Other:     Prognosis: [x] Good [] Fair  [] Poor    Patient Requires Follow-up: [] Yes  [x] No    PLAN:   [] Continue per plan of care [] Alter current plan (see comments)  [] Plan of care initiated [x] Hold pending MD visit [] Discharge    Electronically signed by: Braydon García PTA

## 2018-12-04 ENCOUNTER — OFFICE VISIT (OUTPATIENT)
Dept: FAMILY MEDICINE CLINIC | Age: 58
End: 2018-12-04
Payer: MEDICARE

## 2018-12-04 VITALS
HEART RATE: 73 BPM | BODY MASS INDEX: 42.52 KG/M2 | DIASTOLIC BLOOD PRESSURE: 81 MMHG | WEIGHT: 240 LBS | TEMPERATURE: 98.1 F | HEIGHT: 63 IN | SYSTOLIC BLOOD PRESSURE: 125 MMHG | OXYGEN SATURATION: 97 %

## 2018-12-04 DIAGNOSIS — M15.9 PRIMARY OSTEOARTHRITIS INVOLVING MULTIPLE JOINTS: ICD-10-CM

## 2018-12-04 DIAGNOSIS — I10 BENIGN ESSENTIAL HYPERTENSION: Primary | ICD-10-CM

## 2018-12-04 DIAGNOSIS — E66.01 MORBID OBESITY DUE TO EXCESS CALORIES (HCC): ICD-10-CM

## 2018-12-04 DIAGNOSIS — K52.9 CHRONIC DIARRHEA: ICD-10-CM

## 2018-12-04 DIAGNOSIS — G43.109 MIGRAINE WITH AURA AND WITHOUT STATUS MIGRAINOSUS, NOT INTRACTABLE: ICD-10-CM

## 2018-12-04 DIAGNOSIS — E78.00 PURE HYPERCHOLESTEROLEMIA: ICD-10-CM

## 2018-12-04 DIAGNOSIS — J30.9 ALLERGIC SINUSITIS: ICD-10-CM

## 2018-12-04 DIAGNOSIS — E55.9 VITAMIN D DEFICIENCY: ICD-10-CM

## 2018-12-04 DIAGNOSIS — Z87.891 PERSONAL HISTORY OF TOBACCO USE: ICD-10-CM

## 2018-12-04 DIAGNOSIS — R91.1 PULMONARY NODULE: ICD-10-CM

## 2018-12-04 PROBLEM — F17.200 SMOKER: Status: ACTIVE | Noted: 2018-12-04

## 2018-12-04 LAB
A/G RATIO: 1.8 (ref 1.1–2.2)
ALBUMIN SERPL-MCNC: 4.2 G/DL (ref 3.4–5)
ALP BLD-CCNC: 77 U/L (ref 40–129)
ALT SERPL-CCNC: 14 U/L (ref 10–40)
ANION GAP SERPL CALCULATED.3IONS-SCNC: 13 MMOL/L (ref 3–16)
AST SERPL-CCNC: 14 U/L (ref 15–37)
BASOPHILS ABSOLUTE: 0 K/UL (ref 0–0.2)
BASOPHILS RELATIVE PERCENT: 0.5 %
BILIRUB SERPL-MCNC: <0.2 MG/DL (ref 0–1)
BUN BLDV-MCNC: 14 MG/DL (ref 7–20)
CALCIUM SERPL-MCNC: 9.6 MG/DL (ref 8.3–10.6)
CHLORIDE BLD-SCNC: 108 MMOL/L (ref 99–110)
CHOLESTEROL, TOTAL: 201 MG/DL (ref 0–199)
CO2: 24 MMOL/L (ref 21–32)
CREAT SERPL-MCNC: 0.9 MG/DL (ref 0.6–1.1)
EOSINOPHILS ABSOLUTE: 0.4 K/UL (ref 0–0.6)
EOSINOPHILS RELATIVE PERCENT: 4.7 %
GFR AFRICAN AMERICAN: >60
GFR NON-AFRICAN AMERICAN: >60
GLOBULIN: 2.4 G/DL
GLUCOSE BLD-MCNC: 78 MG/DL (ref 70–99)
HCT VFR BLD CALC: 42.8 % (ref 36–48)
HDLC SERPL-MCNC: 41 MG/DL (ref 40–60)
HEMOGLOBIN: 14.1 G/DL (ref 12–16)
LDL CHOLESTEROL CALCULATED: 115 MG/DL
LYMPHOCYTES ABSOLUTE: 3.3 K/UL (ref 1–5.1)
LYMPHOCYTES RELATIVE PERCENT: 38.6 %
MCH RBC QN AUTO: 31 PG (ref 26–34)
MCHC RBC AUTO-ENTMCNC: 33 G/DL (ref 31–36)
MCV RBC AUTO: 94 FL (ref 80–100)
MONOCYTES ABSOLUTE: 0.8 K/UL (ref 0–1.3)
MONOCYTES RELATIVE PERCENT: 9.7 %
NEUTROPHILS ABSOLUTE: 4 K/UL (ref 1.7–7.7)
NEUTROPHILS RELATIVE PERCENT: 46.5 %
PDW BLD-RTO: 13.7 % (ref 12.4–15.4)
PLATELET # BLD: 325 K/UL (ref 135–450)
PMV BLD AUTO: 10.2 FL (ref 5–10.5)
POTASSIUM SERPL-SCNC: 4.7 MMOL/L (ref 3.5–5.1)
RBC # BLD: 4.55 M/UL (ref 4–5.2)
SODIUM BLD-SCNC: 145 MMOL/L (ref 136–145)
TOTAL PROTEIN: 6.6 G/DL (ref 6.4–8.2)
TRIGL SERPL-MCNC: 224 MG/DL (ref 0–150)
VITAMIN D 25-HYDROXY: 32.8 NG/ML
VLDLC SERPL CALC-MCNC: 45 MG/DL
WBC # BLD: 8.7 K/UL (ref 4–11)

## 2018-12-04 PROCEDURE — 99215 OFFICE O/P EST HI 40 MIN: CPT | Performed by: FAMILY MEDICINE

## 2018-12-04 PROCEDURE — 36415 COLL VENOUS BLD VENIPUNCTURE: CPT | Performed by: FAMILY MEDICINE

## 2018-12-04 PROCEDURE — G0296 VISIT TO DETERM LDCT ELIG: HCPCS | Performed by: FAMILY MEDICINE

## 2018-12-04 RX ORDER — LISINOPRIL AND HYDROCHLOROTHIAZIDE 12.5; 1 MG/1; MG/1
1 TABLET ORAL DAILY
COMMUNITY
End: 2019-01-21 | Stop reason: SDUPTHER

## 2018-12-04 RX ORDER — MELOXICAM 15 MG/1
15 TABLET ORAL DAILY
COMMUNITY

## 2018-12-04 RX ORDER — LOPERAMIDE HYDROCHLORIDE 2 MG/1
2 CAPSULE ORAL
COMMUNITY
End: 2019-05-07 | Stop reason: ALTCHOICE

## 2018-12-04 RX ORDER — BUPROPION HYDROCHLORIDE 150 MG/1
150 TABLET, EXTENDED RELEASE ORAL 2 TIMES DAILY
Qty: 60 TABLET | Refills: 5 | Status: SHIPPED | OUTPATIENT
Start: 2018-12-04 | End: 2019-02-05

## 2018-12-04 RX ORDER — DICYCLOMINE HYDROCHLORIDE 10 MG/1
10-20 CAPSULE ORAL
Qty: 180 CAPSULE | Refills: 5 | Status: SHIPPED | OUTPATIENT
Start: 2018-12-04 | End: 2019-04-12 | Stop reason: SDUPTHER

## 2018-12-04 RX ORDER — LOPERAMIDE HYDROCHLORIDE 2 MG/1
2 CAPSULE ORAL 4 TIMES DAILY PRN
COMMUNITY
End: 2018-12-04 | Stop reason: SDUPTHER

## 2018-12-04 RX ORDER — TRAMADOL HYDROCHLORIDE 50 MG/1
50 TABLET ORAL EVERY 8 HOURS PRN
COMMUNITY
End: 2021-05-11

## 2018-12-04 RX ORDER — BUSPIRONE HYDROCHLORIDE 15 MG/1
7.5 TABLET ORAL 2 TIMES DAILY
COMMUNITY
End: 2019-10-16

## 2018-12-04 RX ORDER — LOPERAMIDE HYDROCHLORIDE 2 MG/1
2 CAPSULE ORAL PRN
Qty: 270 CAPSULE | Refills: 1 | Status: CANCELLED | OUTPATIENT
Start: 2018-12-04

## 2018-12-04 RX ORDER — OXYCODONE HYDROCHLORIDE AND ACETAMINOPHEN 5; 325 MG/1; MG/1
1 TABLET ORAL EVERY 8 HOURS PRN
Status: ON HOLD | COMMUNITY
End: 2019-10-21 | Stop reason: HOSPADM

## 2018-12-04 ASSESSMENT — PATIENT HEALTH QUESTIONNAIRE - PHQ9
SUM OF ALL RESPONSES TO PHQ QUESTIONS 1-9: 0
2. FEELING DOWN, DEPRESSED OR HOPELESS: 0
1. LITTLE INTEREST OR PLEASURE IN DOING THINGS: 0
SUM OF ALL RESPONSES TO PHQ9 QUESTIONS 1 & 2: 0
SUM OF ALL RESPONSES TO PHQ QUESTIONS 1-9: 0

## 2018-12-04 NOTE — PROGRESS NOTES
Hematuria 10/2016    neg cysto, Dr Ludwig Arce    Primary osteoarthritis involving multiple joints     hands, Dr Deborah Newsome Routine health maintenance      TAC 5/13    Tarsal tunnel syndrome 2014        Past Surgical History:   Procedure Laterality Date    BARIATRIC SURGERY  2004    FOOT TENDON SURGERY Left 12/9/14    plantar fasciotomy, tendon transfer, gastrocnemius recession/cortisone    HYSTERECTOMY      OTHER SURGICAL HISTORY  10/14/2016    cystoscopy, bilateral retrogrades, urethral dilation    URETHRAL STRICTURE DILATATION  7/14        Outpatient Prescriptions Marked as Taking for the 12/4/18 encounter (Office Visit) with Jocelin Reed MD   Medication Sig Dispense Refill    vitamin D (CHOLECALCIFEROL) 1000 UNIT TABS tablet Take 1,000 Units by mouth daily      lisinopril-hydrochlorothiazide (PRINZIDE;ZESTORETIC) 10-12.5 MG per tablet Take 1 tablet by mouth daily      meloxicam (MOBIC) 15 MG tablet Take 15 mg by mouth daily      busPIRone (BUSPAR) 15 MG tablet Take 7.5 mg by mouth 2 times daily      aspirin 81 MG tablet Take 81 mg by mouth daily      loperamide (IMODIUM) 2 MG capsule Take 2 mg by mouth Take 1 capsule after 1st loose bowel movement and 1 capsule after each next bowel movement; Do not exceed 8 caps in 24 hours      dicyclomine (BENTYL) 10 MG capsule Take 1-2 capsules by mouth 3 times daily (before meals) As needed for diarrhea 180 capsule 5    buPROPion (WELLBUTRIN SR) 150 MG extended release tablet Take 1 tablet by mouth 2 times daily 60 tablet 5    oxyCODONE-acetaminophen (PERCOCET) 5-325 MG per tablet Take 1 tablet by mouth every 8 hours as needed for Pain. Wes Castillo traMADol (ULTRAM) 50 MG tablet Take 50 mg by mouth every 8 hours as needed for Pain. Wes Castillo gabapentin (NEURONTIN) 300 MG capsule TAKE THREE CAPSULES BY MOUTH THREE TIMES A  capsule 1    amitriptyline (ELAVIL) 25 MG tablet Take 1-3 tablets by mouth nightly To prevent migraines 180 tablet 3        Allergies distress. Eyes:  Conjunctivae and lids are clear             Pupils are equal, round, and reactive, irises nondeformed  Ears:  External ears and nose unremarkable, canals are clear, TMs clear   Mouth:  Lips, gums, tongue, oral mucosa unremarkable  Throat: Palates unremarkable               Pharynx clear  Nose: clear  Neck:  No masses, trachea midline, phonation normal, thyroid unremarkable              No significant cervical or supraclavicular adenopathy  Chest:  No deformity of thorax              Respirations easy and unlabored with equal breath sounds              Lungs clear  Heart:  Regular rhythm with no murmur, rub or gallop. No JVD. Pretibial pitting edema - none. Abdomen:  Soft  with no masses noted                     Hernia - none                    Liver and spleen not palpably enlarged                    Tenderness - none                    Rebound or rididity - none  Neurologic:  Cranial nerves 2-12 are intact                      No ataxia                     No focal motor deficits found     Skin: Warm and dry, turgor normal           No rash            No lesion  Psychiatric: mood and affect intact                     speech and thought processes seem appropriate                      Appearance and behavior unremarkable    Assessment and Plan:   Diagnosis Orders   1. Benign essential hypertension -Stable  CBC Auto Differential    Comprehensive Metabolic Panel    CT CARDIAC CALCIUM SCORING   2. Chronic diarrhea -Highly likely to be IBS  Trial of dicyclomine. If not helpful call    3. Morbid obesity due to excess calories (HCC)  Wellbutrin    4. Migraine with aura and without status migrainosus, not intractable -stable     5. Pure hypercholesterolemia  - status pending result of lab Lipid Panel   6. Allergic sinusitis -Stable     7. Primary osteoarthritis involving multiple joints  Continue to follow with chronic pain management    8.  Vitamin D deficiency  - status pending result of lab Vitamin D

## 2019-01-15 ENCOUNTER — TELEPHONE (OUTPATIENT)
Dept: FAMILY MEDICINE CLINIC | Age: 59
End: 2019-01-15

## 2019-01-15 RX ORDER — VARENICLINE TARTRATE 25 MG
KIT ORAL
Qty: 1 EACH | Refills: 0 | Status: SHIPPED | OUTPATIENT
Start: 2019-01-15 | End: 2019-02-05

## 2019-01-21 ENCOUNTER — TELEPHONE (OUTPATIENT)
Dept: ORTHOPEDIC SURGERY | Age: 59
End: 2019-01-21

## 2019-01-21 DIAGNOSIS — G43.109 MIGRAINE WITH AURA AND WITHOUT STATUS MIGRAINOSUS, NOT INTRACTABLE: ICD-10-CM

## 2019-01-21 RX ORDER — AMITRIPTYLINE HYDROCHLORIDE 25 MG/1
25-75 TABLET, FILM COATED ORAL NIGHTLY
Qty: 180 TABLET | Refills: 3 | Status: SHIPPED | OUTPATIENT
Start: 2019-01-21 | End: 2019-06-27 | Stop reason: SDUPTHER

## 2019-01-21 RX ORDER — LISINOPRIL AND HYDROCHLOROTHIAZIDE 12.5; 1 MG/1; MG/1
1 TABLET ORAL DAILY
Qty: 30 TABLET | Refills: 5 | Status: SHIPPED | OUTPATIENT
Start: 2019-01-21 | End: 2019-07-10 | Stop reason: SDUPTHER

## 2019-01-21 NOTE — TELEPHONE ENCOUNTER
Submitted PA for Chantix Starting Month Ramo 0.5 MG X 11 &1 MG X 42 OR TABS, Key: [id number] - PA Case ID: [id number] - Rx #: [id number]. Status PENDING.

## 2019-02-05 ENCOUNTER — OFFICE VISIT (OUTPATIENT)
Dept: FAMILY MEDICINE CLINIC | Age: 59
End: 2019-02-05
Payer: MEDICARE

## 2019-02-05 VITALS
SYSTOLIC BLOOD PRESSURE: 126 MMHG | TEMPERATURE: 97.8 F | BODY MASS INDEX: 42.69 KG/M2 | WEIGHT: 241 LBS | HEART RATE: 72 BPM | DIASTOLIC BLOOD PRESSURE: 76 MMHG | OXYGEN SATURATION: 96 %

## 2019-02-05 DIAGNOSIS — K58.0 IRRITABLE BOWEL SYNDROME WITH DIARRHEA: Primary | ICD-10-CM

## 2019-02-05 DIAGNOSIS — F17.200 SMOKER: ICD-10-CM

## 2019-02-05 PROCEDURE — 99213 OFFICE O/P EST LOW 20 MIN: CPT | Performed by: FAMILY MEDICINE

## 2019-02-05 RX ORDER — VARENICLINE TARTRATE 1 MG/1
1 TABLET, FILM COATED ORAL 2 TIMES DAILY
Qty: 60 TABLET | Refills: 5 | Status: SHIPPED | OUTPATIENT
Start: 2019-02-05 | End: 2019-10-16

## 2019-04-12 ENCOUNTER — OFFICE VISIT (OUTPATIENT)
Dept: FAMILY MEDICINE CLINIC | Age: 59
End: 2019-04-12
Payer: MEDICARE

## 2019-04-12 VITALS
DIASTOLIC BLOOD PRESSURE: 71 MMHG | WEIGHT: 255 LBS | HEART RATE: 64 BPM | SYSTOLIC BLOOD PRESSURE: 111 MMHG | OXYGEN SATURATION: 97 % | TEMPERATURE: 98 F | BODY MASS INDEX: 45.17 KG/M2

## 2019-04-12 DIAGNOSIS — G89.29 CHRONIC PAIN OF RIGHT KNEE: Primary | ICD-10-CM

## 2019-04-12 DIAGNOSIS — B35.1 ONYCHOMYCOSIS OF TOENAIL: ICD-10-CM

## 2019-04-12 DIAGNOSIS — M25.561 CHRONIC PAIN OF RIGHT KNEE: Primary | ICD-10-CM

## 2019-04-12 PROCEDURE — 99213 OFFICE O/P EST LOW 20 MIN: CPT | Performed by: FAMILY MEDICINE

## 2019-04-12 RX ORDER — DICYCLOMINE HYDROCHLORIDE 10 MG/1
10-20 CAPSULE ORAL
Qty: 540 CAPSULE | Refills: 1 | Status: SHIPPED | OUTPATIENT
Start: 2019-04-12 | End: 2019-09-04 | Stop reason: SDUPTHER

## 2019-04-12 RX ORDER — ITRACONAZOLE 100 MG/1
200 CAPSULE ORAL DAILY
Qty: 60 CAPSULE | Refills: 2 | Status: SHIPPED | OUTPATIENT
Start: 2019-04-12 | End: 2019-08-05 | Stop reason: SDUPTHER

## 2019-04-12 ASSESSMENT — PATIENT HEALTH QUESTIONNAIRE - PHQ9
SUM OF ALL RESPONSES TO PHQ9 QUESTIONS 1 & 2: 0
2. FEELING DOWN, DEPRESSED OR HOPELESS: 0
1. LITTLE INTEREST OR PLEASURE IN DOING THINGS: 0
SUM OF ALL RESPONSES TO PHQ QUESTIONS 1-9: 0
SUM OF ALL RESPONSES TO PHQ QUESTIONS 1-9: 0

## 2019-04-18 ENCOUNTER — OFFICE VISIT (OUTPATIENT)
Dept: ORTHOPEDIC SURGERY | Age: 59
End: 2019-04-18
Payer: MEDICARE

## 2019-04-18 VITALS — HEIGHT: 63 IN | WEIGHT: 255.07 LBS | BODY MASS INDEX: 45.2 KG/M2

## 2019-04-18 DIAGNOSIS — M17.11 PRIMARY OSTEOARTHRITIS OF RIGHT KNEE: Primary | ICD-10-CM

## 2019-04-18 DIAGNOSIS — M25.561 RIGHT KNEE PAIN, UNSPECIFIED CHRONICITY: ICD-10-CM

## 2019-04-18 DIAGNOSIS — E66.01 OBESITY, CLASS III, BMI 40-49.9 (MORBID OBESITY) (HCC): ICD-10-CM

## 2019-04-18 PROCEDURE — 99213 OFFICE O/P EST LOW 20 MIN: CPT | Performed by: PHYSICIAN ASSISTANT

## 2019-04-18 NOTE — PROGRESS NOTES
Kenalog-40 400mg/10mL  2 cc  Lot #  [id number]  Ext  4/2020  ND#  2623-7998-59  Bupivacaine 250mg/50mL  3 cc  Lot # -[id number]  Exp  11/1/2020  NDC# 6126-1352-38    SITE:   RIGHT KNEE

## 2019-04-18 NOTE — PROGRESS NOTES
Chief Complaint    Knee Pain (RIGHT medial knee pain; hx of meniscus surgery with [place]; has been having injections (nerve blocks) with pain management for OA)      History of Present Illness:  Myrtle John is a 60 y.o. female presents to the office today for a new problem. Chief complaint right knee pain. Patient's right knee has been painful for past 9 months without injury or trauma. Patient was sent in orthopedic consultation for Dr. Myrtle John. Patient is here complaining of pain over medial aspect of her knee. No injury or trauma. She has been receiving nerve block by Dr. Yrn Yates without help. In 2017 she did have arthroscopic surgery by Dr. Ward Camacho. She also has a history of gastric bypass surgery.   Her current BMI is 45.20     Pain Assessment  Location of Pain: Knee  Location Modifiers: Right, Medial  Severity of Pain: 8  Quality of Pain: Sharp, Dull, Aching  Duration of Pain: Persistent  Frequency of Pain: Intermittent  Aggravating Factors: Walking, Standing, Stairs, Squatting  Limiting Behavior: Some  Relieving Factors: Rest]    Medical History:  Past Medical History:   Diagnosis Date    Chest pain 11/10    negative myoview stress    Hematuria 10/2016    neg cysto, Dr Dipti Doyle    Primary osteoarthritis involving multiple joints     hands, Dr Reynaldo Figueroa Routine health maintenance      TAC 5/13    Tarsal tunnel syndrome 2014     Patient Active Problem List    Diagnosis Date Noted    Primary osteoarthritis involving multiple joints      Priority: High    Migraine with aura and without status migrainosus, not intractable 05/17/2016     Priority: Medium    Morbid obesity due to excess calories ([address] 01/04/2016     Priority: Medium    Pulmonary nodule 01/25/2016     Priority: Low    Pure hypercholesterolemia 01/07/2016     Priority: Low    Allergic sinusitis      Priority: Low    Benign essential hypertension 12/04/2018    Smoker 12/04/2018    Vitamin D deficiency 12/20/2016    Calculus of gallbladder without cholecystitis without obstruction 2016    Chronic foot pain 2016    SPENCER (obstructive sleep apnea)      Past Surgical History:   Procedure Laterality Date    BARIATRIC SURGERY  2004    FOOT TENDON SURGERY Left 14    plantar fasciotomy, tendon transfer, gastrocnemius recession/cortisone    HYSTERECTOMY      KNEE ARTHROSCOPY      OTHER SURGICAL HISTORY  10/14/2016    cystoscopy, bilateral retrogrades, urethral dilation    URETHRAL STRICTURE DILATATION       Family History   Problem Relation Age of Onset    High Blood Pressure Mother     High Cholesterol Mother     Diabetes Mother     Stroke Mother     High Blood Pressure Father     High Cholesterol Father     Asthma Father     High Blood Pressure Brother     High Cholesterol Brother     Heart Disease Brother 62    Diabetes Brother      Social History     Socioeconomic History    Marital status:      Spouse name: None    Number of children: None    Years of education: None    Highest education level: None   Occupational History    None   Social Needs    Financial resource strain: None    Food insecurity:     Worry: None     Inability: None    Transportation needs:     Medical: None     Non-medical: None   Tobacco Use    Smoking status: Current Every Day Smoker     Packs/day: 1.00     Years: 42.00     Pack years: 42.00     Types: Cigarettes     Last attempt to quit: 5/3/2016     Years since quittin.9    Smokeless tobacco: Never Used   Substance and Sexual Activity    Alcohol use: No     Alcohol/week: 0.0 oz    Drug use: No    Sexual activity: None   Lifestyle    Physical activity:     Days per week: None     Minutes per session: None    Stress: None   Relationships    Social connections:     Talks on phone: None     Gets together: None     Attends Confucianism service: None     Active member of club or organization: None     Attends meetings of clubs or organizations: None Relationship status: None    Intimate partner violence:     Fear of current or ex partner: None     Emotionally abused: None     Physically abused: None     Forced sexual activity: None   Other Topics Concern    None   Social History Narrative    None     Current Outpatient Medications   Medication Sig Dispense Refill    dicyclomine (BENTYL) 10 MG capsule Take 1-2 capsules by mouth 3 times daily (before meals) As needed for diarrhea 540 capsule 1    itraconazole (SPORANOX) 100 MG capsule Take 2 capsules by mouth daily 60 capsule 2    varenicline (CHANTIX CONTINUING MONTH EZEKIEL) 1 MG tablet Take 1 tablet by mouth 2 times daily 60 tablet 5    lisinopril-hydrochlorothiazide (PRINZIDE;ZESTORETIC) 10-12.5 MG per tablet Take 1 tablet by mouth daily 30 tablet 5    amitriptyline (ELAVIL) 25 MG tablet Take 1-3 tablets by mouth nightly To prevent migraines 180 tablet 3    vitamin D (CHOLECALCIFEROL) 1000 UNIT TABS tablet Take 1,000 Units by mouth daily      meloxicam (MOBIC) 15 MG tablet Take 15 mg by mouth daily      busPIRone (BUSPAR) 15 MG tablet Take 7.5 mg by mouth 2 times daily      aspirin 81 MG tablet Take 81 mg by mouth daily      loperamide (IMODIUM) 2 MG capsule Take 2 mg by mouth Take 1 capsule after 1st loose bowel movement and 1 capsule after each next bowel movement; Do not exceed 8 caps in 24 hours      oxyCODONE-acetaminophen (PERCOCET) 5-325 MG per tablet Take 1 tablet by mouth every 8 hours as needed for Pain. Pepper Frankel traMADol (ULTRAM) 50 MG tablet Take 50 mg by mouth every 8 hours as needed for Pain. Pepper Frankel gabapentin (NEURONTIN) 300 MG capsule TAKE THREE CAPSULES BY MOUTH THREE TIMES A  capsule 1     No current facility-administered medications for this visit. Review of Systems:  Relevant review of systems reviewed and available in the patient's chart    Vital Signs: There were no vitals filed for this visit.     General Exam:   Constitutional: Patient is adequately groomed with no evidence of malnutrition  DTRs: Deep tendon reflexes are intact  Mental Status: The patient is oriented to time, place and person. The patient's mood and affect are appropriate. Lymphatic: The lymphatic examination bilaterally reveals all areas to be without enlargement or induration. Vascular: Examination reveals no swelling or calf tenderness. Peripheral pulses are palpable and 2+. Neurological: The patient has good coordination. There is no weakness or sensory deficit. Body mass index is 45.2 kg/m². Right Knee Examination:    Inspection:  No erythema or signs of infection. There are no cutaneous lesions    Palpation:  There is tenderness to palpation along the medial joint line. Range of Motion:  0 extension to 120 of active flexion. Strength:  4+/5 quadriceps and hamstrings    Special Tests: The knee is stable to varus valgus stressing/anterior posterior drawer. Negative Homans test.                                 Skin: There are no rashes, ulcerations or lesions. Gait: mildly antalgic favoring the left side    Reflex 2+ patellar    Examination of the left knee reveals intact skin. There is no focal tenderness. The patient demonstrates full painless range of motion with regard to flexion and extension. Strength is 5/5 throughout all planes. Ligamentous stability is grossly intact. Examination of the right and left hip reveals intact skin. The patient demonstrates full painless range of motion with regards to flexion, abduction, internal and external rotation. There is no tenderness about the greater trochanter. There is a negative straight leg raise against resistance. Strength is 5/5 throughout all planes. Radiology:   X-rays obtained and reviewed in office:  Views 4 views including AP weightbearing, PA flexed weightbearing, lateral, and skyline  Location  right knee:    Impression:   There is advanced medial joint space thinning with sclerosis and osteophyte formation present. The patellofemoral joint with well-maintained joint surface but significant osteophyte formation. No fractures are identified. Impression:  Encounter Diagnoses   Name Primary?  Right knee pain, unspecified chronicity Yes    Primary osteoarthritis of right knee        Office Procedures:  Orders Placed This Encounter   Procedures    XR KNEE RIGHT (MIN 4 VIEWS)     Standing Status:   Future     Number of Occurrences:   1     Standing Expiration Date:   4/18/2020    US ARTHR/ASP/INJ MAJOR JNT/BURSA RIGHT     Standing Status:   Future     Standing Expiration Date:   4/18/2020    [employer]     Referral Priority:   Routine     Referral Type:   Consult for Advice and Opinion     Referral Reason:   Specialty Services Required     Requested Specialty:   Nutrition     Number of Visits Requested:   1    MS TRIAMCINOLONE ACETONIDE INJ     Right knee cortisone injection    I discussed in detail the risks, benefits and complications of aninjection which included but are not limited to infection, skin reactions, hot swollen joint, and anaphylaxis with the patient. The patient verbalized understanding and gave informed consent for the right knee injection. The patient is placed supine on table with a bolster underneath knee. Knee prepped with Betadine/Sterile alcohol solution. A sterile 22-gauge needle was inserted into the knee and the mixture of 2ml Kenalog 40 mg per mL p, 3 mL of 0.5% bupivacaine was injected under sterile technique. The needle was withdrawn and the puncture site sealed with a Band-Aid. Technique: Under sterile conditions a [employer] ultrasound unit with a variable frequency (6.0-15.0 MHz) linear transducer was used to localize the placement of a 22-gauge needle into the knee joint. Findings: Successful needle placement for knee injection. Final images were taken and saved for permanent record. The patient tolerated the injection well.  The patient was instructed to call the office immediately if there is any pain, redness, warmth, fever, or chills. Treatment Plan:  I discussed with the patient the nature of osteoarthritis of the knee. We talked about treatment of arthritis and the various options that are involved with this. The patient understands that the treatments can vary from essentially doing nothing to a total joint replacement arthroplasty for arthritis. I then went on to describe the utilization of glucosamine and chondroitin sulfate as a joint nutrition product. We talked about the fact that this is essentially a joint vitamin with typically minimal side effects. We also talked about utilization of prescription over-the-counter anti-inflammatory medications as the next option. We also discussed the possibility of brace wear or orthotic wear if the patient has significant varus alignment. We then went on to discuss the possibility of Visco supplementation with hyaluronate products. We talked about the typical course of this type of treatment and the fact that often times in the treatment for significant arthritis, this is successful less than half the time. We also talked about the corticosteroid injections and the fact that this can give a brief window of relief, but does not cure the problem; in fact, the pain often has a rebound effect in 6-10 weeks after the steroid has worn off. We also discussed arthroscopy surgery in attempts to debride the joint, but the fact that this is relatively unreliable treatment in the face of significant arthritis. It can occasionally be used, particularly if there is significant meniscus pathology. Lastly we discussed total joint replacement arthroplasty as the final and definitive step in treatment of arthritis. Patient realizes the magnitude of this type of treatment as well as having voiced a general understanding to the duration of the prosthesis.  The patient voiced understanding to these continuum of treatment options. patient is given an official referral to weight management in an effort for medical optimization for surgery. Her current BMI is 45.20. She is given a cortisone injection today right knee. We have discussed Visco supplementation injections future treatment. Follow-up on a when necessary basis.

## 2019-04-19 ENCOUNTER — HOSPITAL ENCOUNTER (OUTPATIENT)
Dept: CT IMAGING | Age: 59
Discharge: HOME | End: 2019-04-19
Payer: MEDICARE

## 2019-04-19 DIAGNOSIS — Z87.891 PERSONAL HISTORY OF TOBACCO USE: ICD-10-CM

## 2019-04-19 PROCEDURE — G0297 LDCT FOR LUNG CA SCREEN: HCPCS

## 2019-04-23 ENCOUNTER — TELEPHONE (OUTPATIENT)
Dept: FAMILY MEDICINE CLINIC | Age: 59
End: 2019-04-23

## 2019-04-24 NOTE — TELEPHONE ENCOUNTER
Plan sent a questionnaire. Does the patient have the dx of ONYCHOMYCOSIS due to tinea that has been confirmed by a fungal diagnostic test?    Does the patient have one of the following diagnoses: BLASTOMYCOSIS, HISTOPLASMOSIS, ASPERGILLOSIS, COCCIDIOIDOMYCOSIS, CRYPTOCOCCOSIS, MICROSPORIDIOSIS, PENICILLIOSIS, SPOROTRICHOSIS, PITYRIASIS VERSICOLOR, TINEA VERSICOLOR, TINEA CORPORIS, TINEA CRURIS, TINEA MANUUM, TINEA PEDIS? Please advise. Thank you.

## 2019-04-25 NOTE — TELEPHONE ENCOUNTER
I sent them those office notes so they have that information. They are now sending these questions. Is it safe to say No to both questions?

## 2019-05-07 ENCOUNTER — OFFICE VISIT (OUTPATIENT)
Dept: FAMILY MEDICINE CLINIC | Age: 59
End: 2019-05-07
Payer: MEDICARE

## 2019-05-07 VITALS
WEIGHT: 252.4 LBS | DIASTOLIC BLOOD PRESSURE: 67 MMHG | HEART RATE: 69 BPM | OXYGEN SATURATION: 98 % | BODY MASS INDEX: 44.72 KG/M2 | SYSTOLIC BLOOD PRESSURE: 109 MMHG

## 2019-05-07 DIAGNOSIS — H81.10 BPPV (BENIGN PAROXYSMAL POSITIONAL VERTIGO), UNSPECIFIED LATERALITY: Primary | ICD-10-CM

## 2019-05-07 PROCEDURE — 99213 OFFICE O/P EST LOW 20 MIN: CPT | Performed by: NURSE PRACTITIONER

## 2019-05-07 RX ORDER — ONDANSETRON 4 MG/1
4 TABLET, FILM COATED ORAL 3 TIMES DAILY PRN
Qty: 30 TABLET | Refills: 0 | Status: SHIPPED | OUTPATIENT
Start: 2019-05-07 | End: 2019-10-16

## 2019-05-07 RX ORDER — MECLIZINE HYDROCHLORIDE 25 MG/1
25 TABLET ORAL 3 TIMES DAILY PRN
Qty: 30 TABLET | Refills: 0 | Status: SHIPPED | OUTPATIENT
Start: 2019-05-07 | End: 2019-05-17

## 2019-05-07 ASSESSMENT — ENCOUNTER SYMPTOMS
VOMITING: 0
RESPIRATORY NEGATIVE: 1
ALLERGIC/IMMUNOLOGIC NEGATIVE: 1
NAUSEA: 1
EYES NEGATIVE: 1

## 2019-05-07 NOTE — PATIENT INSTRUCTIONS
Please read the healthy family handout that you were given and share it with your family. Please compare this printed medication list with your medications at home to be sure they are the same. If you have any medications that are different please contact us immediately at [phone]. Also review your allergies that we have listed, these may also include medications that you have not been able to tolerate, make sure everything listed is correct. If you have any allergies that are different please contact us immediately at [phone]. Patient Education        Benign Paroxysmal Positional Vertigo (BPPV): Care Instructions  Your Care Instructions    Benign paroxysmal positional vertigo, also called BPPV, is an inner ear problem. It causes a spinning or whirling sensation when you move your head. This sensation is called vertigo. The vertigo usually lasts for less than a minute. People often have vertigo spells for a few days or weeks. Then the vertigo goes away. But it may come back again. The vertigo may be mild, or it may be bad enough to cause unsteadiness, nausea, and vomiting. When you move, your inner ear sends messages to the brain. This helps you keep your balance. Vertigo can happen when debris builds up in the inner ear. The buildup can cause the inner ear to send the wrong message to the brain. Your doctor may move you in different positions to help your vertigo get better faster. This is called the Epley maneuver. Your doctor may also prescribe medicines or exercises to help with your symptoms. Follow-up care is a key part of your treatment and safety. Be sure to make and go to all appointments, and call your doctor if you are having problems. It's also a good idea to know your test results and keep a list of the medicines you take. How can you care for yourself at home? · If your doctor suggests that you do Garcia-Daroff exercises:  ? Sit on the edge of a bed or sofa.  Quickly lie down on the side that causes the worst vertigo. Lie on your side with your ear down. ? Stay in this position for at least 30 seconds or until the vertigo goes away. ? Sit up. If this causes vertigo, wait for it to stop. ? Repeat the procedure on the other side. ? Repeat this 10 times. Do these exercises 2 times a day until the vertigo is gone. When should you call for help? Call 911 anytime you think you may need emergency care. For example, call if:    · You have symptoms of a stroke. These may include:  ? Sudden numbness, tingling, weakness, or loss of movement in your face, arm, or leg, especially on only one side of your body. ? Sudden vision changes. ? Sudden trouble speaking. ? Sudden confusion or trouble understanding simple statements. ? Sudden problems with walking or balance. ? A sudden, severe headache that is different from past headaches.    Call your doctor now or seek immediate medical care if:    · You have new or worse nausea and vomiting.     · You have new symptoms such as hearing loss or roaring in your ears.    Watch closely for changes in your health, and be sure to contact your doctor if:    · You are not getting better as expected.     · Your vertigo gets worse. Where can you learn more? Go to https://[employer] - United Statespepiceweb.[employer]. org and sign in to your [employer] account. Enter  in the [employer] box to learn more about \"Benign Paroxysmal Positional Vertigo (BPPV): Care Instructions. \"     If you do not have an account, please click on the \"Sign Up Now\" link. Current as of: March 27, 2018  Content Version: 11.9  © 0090-2589 [employer]. Care instructions adapted under license by [facility] ([facility]). If you have questions about a medical condition or this instruction, always ask your healthcare professional. [place] [address] any warranty or liability for your use of this information.          Patient Education        Epley Maneuver for Vertigo: in your digestive tract (stomach or intestines). Ondansetron is not expected to harm an unborn baby. Tell your doctor if you are pregnant. It is not known whether ondansetron passes into breast milk or if it could harm a nursing baby. Tell your doctor if you are breast-feeding a baby. Ondansetron is not approved for use by anyone younger than 3years old. Ondansetron orally disintegrating tablets may contain phenylalanine. Tell your doctor if you have phenylketonuria (PKU). How should I take ondansetron? Follow all directions on your prescription label. Do not take this medicine in larger or smaller amounts or for longer than recommended. Ondansetron can be taken with or without food. The first dose of ondansetron is usually taken before the start of your surgery, chemotherapy, or radiation treatment. Follow your doctor's dosing instructions very carefully. Take the ondansetron regular tablet  with a full glass of water. To take the orally disintegrating tablet (Zofran ODT):  · Keep the tablet in its blister pack until you are ready to take it. Open the package and peel back the foil. Do not push a tablet through the foil or you may damage the tablet. · Use dry hands to remove the tablet and place it in your mouth. · Do not swallow the tablet whole. Allow it to dissolve in your mouth without chewing. · Swallow several times as the tablet dissolves. To use ondansetron oral soluble film (strip) (Kasandra Sorrel):  · Keep the strip in the foil pouch until you are ready to use the medicine. · Using dry hands, remove the strip and place it on your tongue. It will begin to dissolve right away. · Do not swallow the strip whole. Allow it to dissolve in your mouth without chewing. · Swallow several times after the strip dissolves. If desired, you may drink liquid to help swallow the dissolved strip. · Wash your hands after using Kasandra Sorrel.   Measure liquid medicine with the dosing syringe provided, or with a special dose-measuring spoon or medicine cup. If you do not have a dose-measuring device, ask your pharmacist for one. Store at room temperature away from moisture, heat, and light. Store liquid medicine in an upright position. What happens if I miss a dose? Take the missed dose as soon as you remember. Skip the missed dose if it is almost time for your next scheduled dose. Do not  take extra medicine to make up the missed dose. What happens if I overdose? Seek emergency medical attention or call the Poison Help line at 1-690.322.5444. Overdose symptoms may include sudden loss of vision, severe constipation, feeling light-headed, or fainting. What should I avoid while taking ondansetron? Ondansetron may impair your thinking or reactions. Be careful if you drive or do anything that requires you to be alert. What are the possible side effects of ondansetron? Get emergency medical help if you have signs of an allergic reaction: rash, hives; fever, chills, difficult breathing; swelling of your face, lips, tongue, or throat. Call your doctor at once if you have:  · severe constipation, stomach pain, or bloating;  · headache with chest pain and severe dizziness, fainting, fast or pounding heartbeats;  · fast or pounding heartbeats;  · jaundice (yellowing of the skin or eyes);  · blurred vision or temporary vision loss (lasting from only a few minutes to several hours);  · high levels of serotonin in the body --agitation, hallucinations, fever, fast heart rate, overactive reflexes, nausea, vomiting, diarrhea, loss of coordination, fainting. Common side effects may include:  · diarrhea or constipation;  · headache;  · drowsiness; or  · tired feeling. This is not a complete list of side effects and others may occur. Call your doctor for medical advice about side effects. You may report side effects to FDA at 7-924-GLE-1016. What other drugs will affect ondansetron?   Ondansetron can cause a serious heart problem, especially if you use certain medicines at the same time, including antibiotics, antidepressants, heart rhythm medicine, antipsychotic medicines, and medicines to treat cancer, malaria, HIV or AIDS. Tell your doctor about all medicines you use, and those you start or stop using during your treatment with ondansetron. Taking ondansetron while you are using certain other medicines can cause high levels of serotonin to build up in your body, a condition called \"serotonin syndrome,\" which can be fatal. Tell your doctor if you also use:  · medicine to treat depression;  · medicine to treat a psychiatric disorder;  · a narcotic (opioid) medication; or  · medicine to prevent nausea and vomiting. This list is not complete and many other drugs can interact with ondansetron. This includes prescription and over-the-counter medicines, vitamins, and herbal products. Give a list of all your medicines to any healthcare provider who treats you. Where can I get more information? Your pharmacist can provide more information about ondansetron. Remember, keep this and all other medicines out of the reach of children, never share your medicines with others, and use this medication only for the indication prescribed. Every effort has been made to ensure that the information provided by [address] [place] [address]  is accurate, up-to-date, and complete, but no guarantee is made to that effect. Drug information contained herein may be time sensitive. [facility] information has been compiled for use by healthcare practitioners and consumers in the [place] and therefore [employer] does not warrant that uses outside of the [place] are appropriate, unless specifically indicated otherwise. [facility]'s drug information does not endorse drugs, diagnose patients or recommend therapy.  [facility]'s drug information is an informational resource designed to assist licensed healthcare practitioners in caring for their patients and/or to serve consumers viewing this service as a supplement to, and not a substitute for, the expertise, skill, knowledge and judgment of healthcare practitioners. The absence of a warning for a given drug or drug combination in no way should be construed to indicate that the drug or drug combination is safe, effective or appropriate for any given patient. [facility] does not assume any responsibility for any aspect of healthcare administered with the aid of information [facility] provides. The information contained herein is not intended to cover all possible uses, directions, precautions, warnings, drug interactions, allergic reactions, or adverse effects. If you have questions about the drugs you are taking, check with your doctor, nurse or pharmacist.  Copyright 1023-4907 [address]: 13.01. Revision date: 10/21/2016. Care instructions adapted under license by [facility] ([facility]). If you have questions about a medical condition or this instruction, always ask your healthcare professional. [place] [address] any warranty or liability for your use of this information. Patient Education        meclizine  Pronunciation:  JENN eason  Brand:  Antivert, Bonine, D-Vert, Dramamine Less Drowsy  What is the most important information I should know about meclizine? You should not take this medication if you are allergic to meclizine. Before you take meclizine, tell your doctor if you have liver or kidney disease, asthma, glaucoma, an enlarged prostate, or urination problems. This medication may impair your thinking or reactions. Be careful if you drive or do anything that requires you to be alert. Drinking alcohol can increase certain side effects of meclizine. Cold or allergy medicine, sedatives, narcotic pain medicine, sleeping pills, muscle relaxers, and medicine for seizures, depression or anxiety can add to sleepiness caused by meclizine. What is meclizine?   Meclizine is an antihistamine that reduces If you are taking the medication regularly, take the missed dose as soon as you remember. Skip the missed dose if it is almost time for your next scheduled dose. Do not take extra medicine to make up the missed dose. What happens if I overdose? Seek emergency medical attention or call the Poison Help line at 1-927.548.4273. What should I avoid while taking meclizine? This medication may impair your thinking or reactions. Be careful if you drive or do anything that requires you to be alert. Drinking alcohol can increase certain side effects of meclizine. What are the possible side effects of meclizine? Get emergency medical help if you have any of these signs of an allergic reaction: hives; difficult breathing; swelling of your face, lips, tongue, or throat. Common side effects may include:  · headache;  · vomiting;  · dry mouth;  · tired feeling; or  · drowsiness. This is not a complete list of side effects and others may occur. Call your doctor for medical advice about side effects. You may report side effects to FDA at 8-329-QVR-1416. What other drugs will affect meclizine? Taking meclizine with other drugs that make you sleepy or slow your breathing can increase these effects. Ask your doctor before taking meclizine with a sleeping pill, narcotic pain medicine, muscle relaxer, or medicine for anxiety, depression, or seizures. Tell your doctor about all medicines you use, and those you start or stop using during your treatment with meclizine, especially:  · cinacalcet;  · quinidine;  · terbinafine; or  · the antidepressants bupropion, duloxetine, fluoxetine, paroxetine, or sertraline. Other drugs may interact with meclizine, including prescription and over-the-counter medicines, vitamins, and herbal products. Tell each of your healthcare providers about all medicines you use now, and any medicine you start or stop using. Where can I get more information?   Your pharmacist can provide more information any warranty or liability for your use of this information.

## 2019-05-07 NOTE — PROGRESS NOTES
Subjective:      Patient ID: Keisha Regalado is a 60 y.o. female. HPI    Chief Complaint   Patient presents with    Dizziness    Nausea     Vertigo  She complains of dizziness. The dizziness has been present for 3 day. She describes the symptoms as vertigo and nausea. Symptoms are exacerbated by rapid head movements. She also complains of none. She denies ear pain/fullness, tinnitis,. She has been treated with nothing. Review of Systems   Constitutional: Negative. HENT: Negative. Eyes: Negative. Respiratory: Negative. Cardiovascular: Negative. Gastrointestinal: Positive for nausea. Negative for vomiting. Endocrine: Negative. Musculoskeletal: Positive for gait problem. Skin: Negative. Allergic/Immunologic: Negative. Neurological: Positive for dizziness. Negative for syncope. Hematological: Negative. Psychiatric/Behavioral: Negative.         Patient Active Problem List   Diagnosis    SPENCER (obstructive sleep apnea)    Primary osteoarthritis involving multiple joints    Morbid obesity due to excess calories (HCC)    Allergic sinusitis    Chronic foot pain    Pure hypercholesterolemia    Pulmonary nodule    Migraine with aura and without status migrainosus, not intractable    Calculus of gallbladder without cholecystitis without obstruction    Vitamin D deficiency    Benign essential hypertension    Smoker       Outpatient Medications Marked as Taking for the 5/7/19 encounter (Office Visit) with RUBEN Morocho CNP   Medication Sig Dispense Refill    dicyclomine (BENTYL) 10 MG capsule Take 1-2 capsules by mouth 3 times daily (before meals) As needed for diarrhea 540 capsule 1    itraconazole (SPORANOX) 100 MG capsule Take 2 capsules by mouth daily 60 capsule 2    lisinopril-hydrochlorothiazide (PRINZIDE;ZESTORETIC) 10-12.5 MG per tablet Take 1 tablet by mouth daily 30 tablet 5    amitriptyline (ELAVIL) 25 MG tablet Take 1-3 tablets by mouth nightly To prevent migraines 180 tablet 3    meloxicam (MOBIC) 15 MG tablet Take 15 mg by mouth daily      busPIRone (BUSPAR) 15 MG tablet Take 7.5 mg by mouth 2 times daily      aspirin 81 MG tablet Take 81 mg by mouth daily      oxyCODONE-acetaminophen (PERCOCET) 5-325 MG per tablet Take 1 tablet by mouth every 8 hours as needed for Pain. [place] traMADol (ULTRAM) 50 MG tablet Take 50 mg by mouth every 8 hours as needed for Pain. Kenn Cameron gabapentin (NEURONTIN) 300 MG capsule TAKE THREE CAPSULES BY MOUTH THREE TIMES A  capsule 1       Allergies   Allergen Reactions    Codeine     Morphine And Related      Pt denies    Sulfa Antibiotics      Caused elevation of WBC       Social History     Tobacco Use    Smoking status: Current Every Day Smoker     Packs/day: 1.00     Years: 42.00     Pack years: 42.00     Types: Cigarettes     Last attempt to quit: 5/3/2016     Years since quitting: 3.0    Smokeless tobacco: Never Used   Substance Use Topics    Alcohol use: No     Alcohol/week: 0.0 oz       Objective:   /67   Pulse 69   Wt 252 lb 6.4 oz (114.5 kg)   SpO2 98%   BMI 44.72 kg/m²     Physical Exam   Constitutional: She is oriented to person, place, and time. Vital signs are normal. She appears well-developed and well-nourished. She does not have a sickly appearance. No distress. HENT:   Head: Normocephalic and atraumatic. Eyes: Conjunctivae and EOM are normal.   Neck: Normal range of motion. Neck supple. Cardiovascular: Normal rate, regular rhythm, normal heart sounds and intact distal pulses. Pulmonary/Chest: Effort normal and breath sounds normal.   Abdominal: Soft. Bowel sounds are normal.   Lymphadenopathy:     She has no cervical adenopathy. Neurological: She is alert and oriented to person, place, and time. Skin: Skin is warm, dry and intact. No rash noted. Psychiatric: She has a normal mood and affect. Her speech is normal and behavior is normal.       Assessment/Plan:   1.  BPPV (benign paroxysmal positional vertigo), unspecified laterality  Patient presents today with complaints of dizziness and nausea over the past 3 days that is exacerbated By rapid head movements. Exam is essentially benign. Hallpike maneuver not attempted due to dizziness and nausea during visit. Patient denies any upper respiratory symptoms, fevers, chills or vomiting. Changes in gait, weakness, changes in vision or headache. Patient did start an antifungal last week for onychomycosis. I suspect symptoms are related to benign positional paroxysmal vertigo however may also be related to side effect of itraconazole. I did advised patient to hold off on the itraconazole until current symptoms resolve then may start back as directed. Patient to f/u if no better or worsening of symptoms. - ondansetron (ZOFRAN) 4 MG tablet; Take 1 tablet by mouth 3 times daily as needed for Nausea or Vomiting  Dispense: 30 tablet; Refill: 0  - meclizine (ANTIVERT) 25 MG tablet; Take 1 tablet by mouth 3 times daily as needed for Nausea  Dispense: 30 tablet;  Refill: 0

## 2019-06-11 ENCOUNTER — OFFICE VISIT (OUTPATIENT)
Dept: FAMILY MEDICINE CLINIC | Age: 59
End: 2019-06-11
Payer: MEDICARE

## 2019-06-11 VITALS
DIASTOLIC BLOOD PRESSURE: 75 MMHG | SYSTOLIC BLOOD PRESSURE: 112 MMHG | HEART RATE: 64 BPM | OXYGEN SATURATION: 98 % | TEMPERATURE: 97.5 F | WEIGHT: 257 LBS | BODY MASS INDEX: 45.54 KG/M2

## 2019-06-11 DIAGNOSIS — I10 BENIGN ESSENTIAL HYPERTENSION: Primary | ICD-10-CM

## 2019-06-11 DIAGNOSIS — K58.0 IRRITABLE BOWEL SYNDROME WITH DIARRHEA: ICD-10-CM

## 2019-06-11 DIAGNOSIS — G43.109 MIGRAINE WITH AURA AND WITHOUT STATUS MIGRAINOSUS, NOT INTRACTABLE: ICD-10-CM

## 2019-06-11 DIAGNOSIS — E66.01 MORBID OBESITY DUE TO EXCESS CALORIES (HCC): ICD-10-CM

## 2019-06-11 DIAGNOSIS — F17.200 SMOKER: ICD-10-CM

## 2019-06-11 LAB
A/G RATIO: 1.7 (ref 1.1–2.2)
ALBUMIN SERPL-MCNC: 4 G/DL (ref 3.4–5)
ALP BLD-CCNC: 68 U/L (ref 40–129)
ALT SERPL-CCNC: 19 U/L (ref 10–40)
ANION GAP SERPL CALCULATED.3IONS-SCNC: 10 MMOL/L (ref 3–16)
AST SERPL-CCNC: 13 U/L (ref 15–37)
BASOPHILS ABSOLUTE: 0.1 K/UL (ref 0–0.2)
BASOPHILS RELATIVE PERCENT: 0.8 %
BILIRUB SERPL-MCNC: <0.2 MG/DL (ref 0–1)
BUN BLDV-MCNC: 17 MG/DL (ref 7–20)
CALCIUM SERPL-MCNC: 9.3 MG/DL (ref 8.3–10.6)
CHLORIDE BLD-SCNC: 108 MMOL/L (ref 99–110)
CO2: 24 MMOL/L (ref 21–32)
CREAT SERPL-MCNC: 0.7 MG/DL (ref 0.6–1.1)
EOSINOPHILS ABSOLUTE: 0.2 K/UL (ref 0–0.6)
EOSINOPHILS RELATIVE PERCENT: 2.2 %
GFR AFRICAN AMERICAN: >60
GFR NON-AFRICAN AMERICAN: >60
GLOBULIN: 2.3 G/DL
GLUCOSE BLD-MCNC: 94 MG/DL (ref 70–99)
HCT VFR BLD CALC: 41.9 % (ref 36–48)
HEMOGLOBIN: 13.5 G/DL (ref 12–16)
LYMPHOCYTES ABSOLUTE: 2.1 K/UL (ref 1–5.1)
LYMPHOCYTES RELATIVE PERCENT: 29 %
MCH RBC QN AUTO: 30.3 PG (ref 26–34)
MCHC RBC AUTO-ENTMCNC: 32.2 G/DL (ref 31–36)
MCV RBC AUTO: 94 FL (ref 80–100)
MONOCYTES ABSOLUTE: 0.7 K/UL (ref 0–1.3)
MONOCYTES RELATIVE PERCENT: 9 %
NEUTROPHILS ABSOLUTE: 4.3 K/UL (ref 1.7–7.7)
NEUTROPHILS RELATIVE PERCENT: 59 %
PDW BLD-RTO: 14.4 % (ref 12.4–15.4)
PLATELET # BLD: 312 K/UL (ref 135–450)
PMV BLD AUTO: 10 FL (ref 5–10.5)
POTASSIUM SERPL-SCNC: 4.8 MMOL/L (ref 3.5–5.1)
RBC # BLD: 4.46 M/UL (ref 4–5.2)
SODIUM BLD-SCNC: 142 MMOL/L (ref 136–145)
TOTAL PROTEIN: 6.3 G/DL (ref 6.4–8.2)
WBC # BLD: 7.3 K/UL (ref 4–11)

## 2019-06-11 PROCEDURE — 99214 OFFICE O/P EST MOD 30 MIN: CPT | Performed by: FAMILY MEDICINE

## 2019-06-11 PROCEDURE — 36415 COLL VENOUS BLD VENIPUNCTURE: CPT | Performed by: FAMILY MEDICINE

## 2019-06-11 NOTE — PROGRESS NOTES
Assessment and plan  1. Benign essential hypertension  Stable  - CBC Auto Differential  - Comprehensive Metabolic Panel    2. Migraine with aura and without status migrainosus, not intractable  Stable    3. Morbid obesity due to excess calories (HCC)  Uncontrolled. Patient feels she will be more active so we will observe    4. Irritable bowel syndrome with diarrhea  Stable    5. Smoker  Stable    Healthy Family prevention recommendations given. Continue all current prescription medications as listed below. RTC 6 months or sooner prn. Subjective  Patient returns for reevaluation of her medical problems including hypertension, migraine, chronic diarrhea, morbid obesity, and tobacco abuse. Her blood pressures been good. She reports her migraines have been under good control and is pleased with that. She is got complete relief of her diarrhea with the dicyclomine. She has found the Chantix has been helpful in helping her quit smoking. She started to smoke again, about a pack a week, and is back on the Chantix and tolerating it well. Unfortunately she is gained 5 pounds since her last visit. Medications: see list below  Allergies   Allergen Reactions    Codeine     Morphine And Related      Pt denies    Sulfa Antibiotics      Caused elevation of WBC     Past history:  Since I saw her she had to have care here for a 5-day episode of vertigo. It has not reoccurred.     Review of systems:  Constitutional:  fatigue - no                                                  abnormal weight loss - no  Cardiac:  chest pain or discomfort - no                 lightheadedness - no  Respiratory: wheezing - no                       dyspnea on exertion - no            unusual cough - no  Gastrointestinal:  frequent heartburn- no                             dysphagia - no  Urologic:  Hematuria - no                   Dysuria - no    Objective  /75   Pulse 64   Temp 97.5 °F (36.4 °C) (Oral)   Wt 257 lb (116.6 kg) SpO2 98%   BMI 45.54 kg/m²   Patient is alert, oriented, and in no acute distress  Psych:  mood and affect are unremarkable               speech and thought processes appear intact               Behavior and appearance unremarkable  Neck:  No masses, trachea midline, phonation normal   Thyroid - unremarkable              Cervical  adenopathy - nothing significant  Chest:  No deformity of thorax               Respirations easy and unlabored              Lungs - clear to auscultation     Breath sounds - equal  Heart: Regular rhythm with no murmur, rub or gallop. No JVD. Pretibial pitting edema - none. Amos Pan MD    Current Outpatient Medications   Medication Sig Dispense Refill    dicyclomine (BENTYL) 10 MG capsule Take 1-2 capsules by mouth 3 times daily (before meals) As needed for diarrhea 540 capsule 1    itraconazole (SPORANOX) 100 MG capsule Take 2 capsules by mouth daily 60 capsule 2    varenicline (CHANTIX CONTINUING MONTH EZEKIEL) 1 MG tablet Take 1 tablet by mouth 2 times daily 60 tablet 5    lisinopril-hydrochlorothiazide (PRINZIDE;ZESTORETIC) 10-12.5 MG per tablet Take 1 tablet by mouth daily 30 tablet 5    amitriptyline (ELAVIL) 25 MG tablet Take 1-3 tablets by mouth nightly To prevent migraines 180 tablet 3    meloxicam (MOBIC) 15 MG tablet Take 15 mg by mouth daily      busPIRone (BUSPAR) 15 MG tablet Take 7.5 mg by mouth 2 times daily      aspirin 81 MG tablet Take 81 mg by mouth daily      oxyCODONE-acetaminophen (PERCOCET) 5-325 MG per tablet Take 1 tablet by mouth every 8 hours as needed for Pain. [place] traMADol (ULTRAM) 50 MG tablet Take 50 mg by mouth every 8 hours as needed for Pain. [place] gabapentin (NEURONTIN) 300 MG capsule TAKE THREE CAPSULES BY MOUTH THREE TIMES A  capsule 1    ondansetron (ZOFRAN) 4 MG tablet Take 1 tablet by mouth 3 times daily as needed for Nausea or Vomiting 30 tablet 0     No current facility-administered medications for this visit.

## 2019-06-27 DIAGNOSIS — G43.109 MIGRAINE WITH AURA AND WITHOUT STATUS MIGRAINOSUS, NOT INTRACTABLE: ICD-10-CM

## 2019-06-27 RX ORDER — AMITRIPTYLINE HYDROCHLORIDE 25 MG/1
TABLET, FILM COATED ORAL
Qty: 90 TABLET | Refills: 3 | Status: SHIPPED | OUTPATIENT
Start: 2019-06-27 | End: 2020-09-18

## 2019-07-10 RX ORDER — LISINOPRIL AND HYDROCHLOROTHIAZIDE 12.5; 1 MG/1; MG/1
TABLET ORAL
Qty: 30 TABLET | Refills: 5 | Status: SHIPPED | OUTPATIENT
Start: 2019-07-10 | End: 2019-12-27

## 2019-08-05 DIAGNOSIS — B35.1 ONYCHOMYCOSIS OF TOENAIL: ICD-10-CM

## 2019-08-06 RX ORDER — ITRACONAZOLE 100 MG/1
200 CAPSULE ORAL DAILY
Qty: 60 CAPSULE | Refills: 0 | Status: SHIPPED | OUTPATIENT
Start: 2019-08-06 | End: 2019-09-10 | Stop reason: SDUPTHER

## 2019-09-04 RX ORDER — DICYCLOMINE HYDROCHLORIDE 10 MG/1
CAPSULE ORAL
Qty: 540 CAPSULE | Refills: 0 | Status: SHIPPED | OUTPATIENT
Start: 2019-09-04 | End: 2020-03-16

## 2019-09-10 DIAGNOSIS — B35.1 ONYCHOMYCOSIS OF TOENAIL: ICD-10-CM

## 2019-09-10 RX ORDER — ITRACONAZOLE 100 MG/1
200 CAPSULE ORAL DAILY
Qty: 60 CAPSULE | Refills: 0 | Status: SHIPPED | OUTPATIENT
Start: 2019-09-10 | End: 2019-10-07 | Stop reason: SDUPTHER

## 2019-09-26 ENCOUNTER — OFFICE VISIT (OUTPATIENT)
Dept: ORTHOPEDIC SURGERY | Age: 59
End: 2019-09-26
Payer: MEDICARE

## 2019-09-26 VITALS — HEIGHT: 63 IN | RESPIRATION RATE: 16 BRPM | WEIGHT: 257.06 LBS | BODY MASS INDEX: 45.55 KG/M2

## 2019-09-26 DIAGNOSIS — M18.11 ARTHRITIS OF CARPOMETACARPAL (CMC) JOINT OF RIGHT THUMB: ICD-10-CM

## 2019-09-26 DIAGNOSIS — M79.641 RIGHT HAND PAIN: Primary | ICD-10-CM

## 2019-09-26 PROCEDURE — 99203 OFFICE O/P NEW LOW 30 MIN: CPT | Performed by: ORTHOPAEDIC SURGERY

## 2019-09-26 NOTE — PROGRESS NOTES
thumb pinch stress. Surgical intervention can usually be reserved for longstanding recalcitrant cases. However, the patient has a long history of thumb arthritis and has effectively failed conservative care. She would like to move forward now after our discussion with a right thumb CMC arthroplasty with flexor carpi radialis tendon interposition. She has a good understanding of the healing process which she would like to undertake before the next growing season. We discussed the risks, benefits, limitations, alternatives, and postop recovery following the proposed procedure. We will begin the process of scheduling surgery if there are no other preoperative medical contraindications. Please note that this transcription was created using voice recognition software. Any errors are unintentional and may be due to voice recognition transcription.

## 2019-10-07 DIAGNOSIS — B35.1 ONYCHOMYCOSIS OF TOENAIL: ICD-10-CM

## 2019-10-07 RX ORDER — ITRACONAZOLE 100 MG/1
200 CAPSULE ORAL DAILY
Qty: 60 CAPSULE | Refills: 0 | Status: SHIPPED | OUTPATIENT
Start: 2019-10-07 | End: 2019-11-06 | Stop reason: SINTOL

## 2019-10-09 ENCOUNTER — OFFICE VISIT (OUTPATIENT)
Dept: FAMILY MEDICINE CLINIC | Age: 59
End: 2019-10-09
Payer: MEDICARE

## 2019-10-09 VITALS
WEIGHT: 256 LBS | OXYGEN SATURATION: 96 % | TEMPERATURE: 98.3 F | DIASTOLIC BLOOD PRESSURE: 73 MMHG | SYSTOLIC BLOOD PRESSURE: 105 MMHG | BODY MASS INDEX: 45.36 KG/M2 | HEART RATE: 69 BPM

## 2019-10-09 DIAGNOSIS — I10 BENIGN ESSENTIAL HYPERTENSION: Primary | ICD-10-CM

## 2019-10-09 DIAGNOSIS — Z51.81 MEDICATION MONITORING ENCOUNTER: ICD-10-CM

## 2019-10-09 DIAGNOSIS — Z01.810 PREOP CARDIOVASCULAR EXAM: ICD-10-CM

## 2019-10-09 DIAGNOSIS — K58.0 IRRITABLE BOWEL SYNDROME WITH DIARRHEA: ICD-10-CM

## 2019-10-09 DIAGNOSIS — M54.16 LUMBAR RADICULOPATHY: ICD-10-CM

## 2019-10-09 DIAGNOSIS — M15.9 PRIMARY OSTEOARTHRITIS INVOLVING MULTIPLE JOINTS: ICD-10-CM

## 2019-10-09 DIAGNOSIS — B35.1 ONYCHOMYCOSIS OF TOENAIL: ICD-10-CM

## 2019-10-09 DIAGNOSIS — R52 INTRACTABLE PAIN: ICD-10-CM

## 2019-10-09 DIAGNOSIS — G43.109 MIGRAINE WITH AURA AND WITHOUT STATUS MIGRAINOSUS, NOT INTRACTABLE: ICD-10-CM

## 2019-10-09 LAB
BASOPHILS ABSOLUTE: 0.1 K/UL (ref 0–0.2)
BASOPHILS RELATIVE PERCENT: 1 %
EOSINOPHILS ABSOLUTE: 0.5 K/UL (ref 0–0.6)
EOSINOPHILS RELATIVE PERCENT: 5.8 %
HCT VFR BLD CALC: 41 % (ref 36–48)
HEMOGLOBIN: 13.7 G/DL (ref 12–16)
LYMPHOCYTES ABSOLUTE: 2.9 K/UL (ref 1–5.1)
LYMPHOCYTES RELATIVE PERCENT: 37.2 %
MCH RBC QN AUTO: 30.9 PG (ref 26–34)
MCHC RBC AUTO-ENTMCNC: 33.4 G/DL (ref 31–36)
MCV RBC AUTO: 92.5 FL (ref 80–100)
MONOCYTES ABSOLUTE: 0.8 K/UL (ref 0–1.3)
MONOCYTES RELATIVE PERCENT: 9.8 %
NEUTROPHILS ABSOLUTE: 3.6 K/UL (ref 1.7–7.7)
NEUTROPHILS RELATIVE PERCENT: 46.2 %
PDW BLD-RTO: 13.8 % (ref 12.4–15.4)
PLATELET # BLD: 304 K/UL (ref 135–450)
PMV BLD AUTO: 10 FL (ref 5–10.5)
RBC # BLD: 4.43 M/UL (ref 4–5.2)
WBC # BLD: 7.8 K/UL (ref 4–11)

## 2019-10-09 PROCEDURE — 93000 ELECTROCARDIOGRAM COMPLETE: CPT | Performed by: FAMILY MEDICINE

## 2019-10-09 PROCEDURE — 99215 OFFICE O/P EST HI 40 MIN: CPT | Performed by: FAMILY MEDICINE

## 2019-10-09 PROCEDURE — 36415 COLL VENOUS BLD VENIPUNCTURE: CPT | Performed by: FAMILY MEDICINE

## 2019-10-09 RX ORDER — PREDNISONE 20 MG/1
60 TABLET ORAL
Qty: 24 TABLET | Refills: 0 | Status: SHIPPED | OUTPATIENT
Start: 2019-10-09 | End: 2019-10-16

## 2019-10-10 LAB
A/G RATIO: 2.6 (ref 1.1–2.2)
ALBUMIN SERPL-MCNC: 4.5 G/DL (ref 3.4–5)
ALP BLD-CCNC: 69 U/L (ref 40–129)
ALT SERPL-CCNC: 15 U/L (ref 10–40)
ANION GAP SERPL CALCULATED.3IONS-SCNC: 14 MMOL/L (ref 3–16)
AST SERPL-CCNC: 15 U/L (ref 15–37)
BILIRUB SERPL-MCNC: <0.2 MG/DL (ref 0–1)
BUN BLDV-MCNC: 16 MG/DL (ref 7–20)
CALCIUM SERPL-MCNC: 9.2 MG/DL (ref 8.3–10.6)
CHLORIDE BLD-SCNC: 105 MMOL/L (ref 99–110)
CO2: 23 MMOL/L (ref 21–32)
CREAT SERPL-MCNC: 0.6 MG/DL (ref 0.6–1.2)
GFR AFRICAN AMERICAN: >60
GFR NON-AFRICAN AMERICAN: >60
GLOBULIN: 1.7 G/DL
GLUCOSE BLD-MCNC: 78 MG/DL (ref 70–99)
POTASSIUM SERPL-SCNC: 5 MMOL/L (ref 3.5–5.1)
SODIUM BLD-SCNC: 142 MMOL/L (ref 136–145)
TOTAL PROTEIN: 6.2 G/DL (ref 6.4–8.2)

## 2019-10-15 ENCOUNTER — TELEPHONE (OUTPATIENT)
Dept: ORTHOPEDIC SURGERY | Age: 59
End: 2019-10-15

## 2019-10-18 ENCOUNTER — ANESTHESIA EVENT (OUTPATIENT)
Dept: OPERATING ROOM | Age: 59
End: 2019-10-18
Payer: MEDICARE

## 2019-10-21 ENCOUNTER — HOSPITAL ENCOUNTER (OUTPATIENT)
Age: 59
Setting detail: OUTPATIENT SURGERY
Discharge: HOME | End: 2019-10-21
Attending: ORTHOPAEDIC SURGERY | Admitting: ORTHOPAEDIC SURGERY
Payer: MEDICARE

## 2019-10-21 ENCOUNTER — ANESTHESIA (OUTPATIENT)
Dept: OPERATING ROOM | Age: 59
End: 2019-10-21
Payer: MEDICARE

## 2019-10-21 VITALS
OXYGEN SATURATION: 97 % | SYSTOLIC BLOOD PRESSURE: 96 MMHG | TEMPERATURE: 98.6 F | DIASTOLIC BLOOD PRESSURE: 44 MMHG | RESPIRATION RATE: 1 BRPM

## 2019-10-21 VITALS
RESPIRATION RATE: 17 BRPM | SYSTOLIC BLOOD PRESSURE: 176 MMHG | BODY MASS INDEX: 45.36 KG/M2 | HEIGHT: 63 IN | HEART RATE: 78 BPM | WEIGHT: 256 LBS | DIASTOLIC BLOOD PRESSURE: 71 MMHG | OXYGEN SATURATION: 93 % | TEMPERATURE: 98.2 F

## 2019-10-21 DIAGNOSIS — M18.11 ARTHRITIS OF CARPOMETACARPAL (CMC) JOINT OF RIGHT THUMB: Primary | ICD-10-CM

## 2019-10-21 PROCEDURE — 6360000002 HC RX W HCPCS

## 2019-10-21 PROCEDURE — 2709999900 HC NON-CHARGEABLE SUPPLY: Performed by: ORTHOPAEDIC SURGERY

## 2019-10-21 PROCEDURE — 3700000000 HC ANESTHESIA ATTENDED CARE: Performed by: ORTHOPAEDIC SURGERY

## 2019-10-21 PROCEDURE — 2500000003 HC RX 250 WO HCPCS

## 2019-10-21 PROCEDURE — 6360000002 HC RX W HCPCS: Performed by: ORTHOPAEDIC SURGERY

## 2019-10-21 PROCEDURE — 64415 NJX AA&/STRD BRCH PLXS IMG: CPT | Performed by: ANESTHESIOLOGY

## 2019-10-21 PROCEDURE — 6370000000 HC RX 637 (ALT 250 FOR IP)

## 2019-10-21 PROCEDURE — 2580000003 HC RX 258: Performed by: ANESTHESIOLOGY

## 2019-10-21 PROCEDURE — 2580000003 HC RX 258: Performed by: ORTHOPAEDIC SURGERY

## 2019-10-21 PROCEDURE — 7100000011 HC PHASE II RECOVERY - ADDTL 15 MIN: Performed by: ORTHOPAEDIC SURGERY

## 2019-10-21 PROCEDURE — C1713 ANCHOR/SCREW BN/BN,TIS/BN: HCPCS | Performed by: ORTHOPAEDIC SURGERY

## 2019-10-21 PROCEDURE — 7100000000 HC PACU RECOVERY - FIRST 15 MIN: Performed by: ORTHOPAEDIC SURGERY

## 2019-10-21 PROCEDURE — 3600000004 HC SURGERY LEVEL 4 BASE: Performed by: ORTHOPAEDIC SURGERY

## 2019-10-21 PROCEDURE — 3700000001 HC ADD 15 MINUTES (ANESTHESIA): Performed by: ORTHOPAEDIC SURGERY

## 2019-10-21 PROCEDURE — 7100000001 HC PACU RECOVERY - ADDTL 15 MIN: Performed by: ORTHOPAEDIC SURGERY

## 2019-10-21 PROCEDURE — 7100000010 HC PHASE II RECOVERY - FIRST 15 MIN: Performed by: ORTHOPAEDIC SURGERY

## 2019-10-21 PROCEDURE — 3600000014 HC SURGERY LEVEL 4 ADDTL 15MIN: Performed by: ORTHOPAEDIC SURGERY

## 2019-10-21 DEVICE — ANCHOR SUT SZ 2-0 ETHBND V5 BIT QUICKANCHR + MINILOK: Type: IMPLANTABLE DEVICE | Site: THUMB | Status: FUNCTIONAL

## 2019-10-21 RX ORDER — OXYCODONE HYDROCHLORIDE AND ACETAMINOPHEN 5; 325 MG/1; MG/1
1 TABLET ORAL PRN
Status: DISCONTINUED | OUTPATIENT
Start: 2019-10-21 | End: 2019-10-21 | Stop reason: HOSPADM

## 2019-10-21 RX ORDER — ONDANSETRON 2 MG/ML
INJECTION INTRAMUSCULAR; INTRAVENOUS PRN
Status: DISCONTINUED | OUTPATIENT
Start: 2019-10-21 | End: 2019-10-21 | Stop reason: SDUPTHER

## 2019-10-21 RX ORDER — OXYCODONE HYDROCHLORIDE AND ACETAMINOPHEN 5; 325 MG/1; MG/1
2 TABLET ORAL PRN
Status: DISCONTINUED | OUTPATIENT
Start: 2019-10-21 | End: 2019-10-21 | Stop reason: HOSPADM

## 2019-10-21 RX ORDER — PROPOFOL 10 MG/ML
INJECTION, EMULSION INTRAVENOUS PRN
Status: DISCONTINUED | OUTPATIENT
Start: 2019-10-21 | End: 2019-10-21 | Stop reason: SDUPTHER

## 2019-10-21 RX ORDER — APREPITANT 40 MG/1
40 CAPSULE ORAL ONCE
Status: COMPLETED | OUTPATIENT
Start: 2019-10-21 | End: 2019-10-21

## 2019-10-21 RX ORDER — PROMETHAZINE HYDROCHLORIDE 25 MG/ML
6.25 INJECTION, SOLUTION INTRAMUSCULAR; INTRAVENOUS
Status: DISCONTINUED | OUTPATIENT
Start: 2019-10-21 | End: 2019-10-21 | Stop reason: HOSPADM

## 2019-10-21 RX ORDER — HYDRALAZINE HYDROCHLORIDE 20 MG/ML
5 INJECTION INTRAMUSCULAR; INTRAVENOUS EVERY 10 MIN PRN
Status: DISCONTINUED | OUTPATIENT
Start: 2019-10-21 | End: 2019-10-21 | Stop reason: HOSPADM

## 2019-10-21 RX ORDER — MORPHINE SULFATE 2 MG/ML
2 INJECTION, SOLUTION INTRAMUSCULAR; INTRAVENOUS EVERY 5 MIN PRN
Status: DISCONTINUED | OUTPATIENT
Start: 2019-10-21 | End: 2019-10-21

## 2019-10-21 RX ORDER — SODIUM CHLORIDE, SODIUM LACTATE, POTASSIUM CHLORIDE, CALCIUM CHLORIDE 600; 310; 30; 20 MG/100ML; MG/100ML; MG/100ML; MG/100ML
INJECTION, SOLUTION INTRAVENOUS CONTINUOUS
Status: DISCONTINUED | OUTPATIENT
Start: 2019-10-21 | End: 2019-10-21 | Stop reason: HOSPADM

## 2019-10-21 RX ORDER — LIDOCAINE HYDROCHLORIDE 10 MG/ML
0.3 INJECTION, SOLUTION EPIDURAL; INFILTRATION; INTRACAUDAL; PERINEURAL
Status: DISCONTINUED | OUTPATIENT
Start: 2019-10-21 | End: 2019-10-21 | Stop reason: HOSPADM

## 2019-10-21 RX ORDER — DEXAMETHASONE SODIUM PHOSPHATE 10 MG/ML
INJECTION INTRAMUSCULAR; INTRAVENOUS PRN
Status: DISCONTINUED | OUTPATIENT
Start: 2019-10-21 | End: 2019-10-21 | Stop reason: SDUPTHER

## 2019-10-21 RX ORDER — MIDAZOLAM HYDROCHLORIDE 1 MG/ML
INJECTION INTRAMUSCULAR; INTRAVENOUS
Status: DISCONTINUED
Start: 2019-10-21 | End: 2019-10-21 | Stop reason: HOSPADM

## 2019-10-21 RX ORDER — APREPITANT 40 MG/1
CAPSULE ORAL
Status: COMPLETED
Start: 2019-10-21 | End: 2019-10-21

## 2019-10-21 RX ORDER — DIPHENHYDRAMINE HYDROCHLORIDE 50 MG/ML
12.5 INJECTION INTRAMUSCULAR; INTRAVENOUS
Status: DISCONTINUED | OUTPATIENT
Start: 2019-10-21 | End: 2019-10-21 | Stop reason: HOSPADM

## 2019-10-21 RX ORDER — IBUPROFEN 600 MG/1
600 TABLET ORAL EVERY 6 HOURS PRN
Qty: 60 TABLET | Refills: 1 | Status: SHIPPED | OUTPATIENT
Start: 2019-10-21 | End: 2020-06-16

## 2019-10-21 RX ORDER — MORPHINE SULFATE 2 MG/ML
1 INJECTION, SOLUTION INTRAMUSCULAR; INTRAVENOUS EVERY 5 MIN PRN
Status: DISCONTINUED | OUTPATIENT
Start: 2019-10-21 | End: 2019-10-21

## 2019-10-21 RX ORDER — SODIUM CHLORIDE 0.9 % (FLUSH) 0.9 %
10 SYRINGE (ML) INJECTION PRN
Status: DISCONTINUED | OUTPATIENT
Start: 2019-10-21 | End: 2019-10-21 | Stop reason: HOSPADM

## 2019-10-21 RX ORDER — SCOLOPAMINE TRANSDERMAL SYSTEM 1 MG/1
PATCH, EXTENDED RELEASE TRANSDERMAL
Status: DISCONTINUED
Start: 2019-10-21 | End: 2019-10-21 | Stop reason: HOSPADM

## 2019-10-21 RX ORDER — ONDANSETRON 4 MG/1
4 TABLET, FILM COATED ORAL EVERY 8 HOURS PRN
Qty: 10 TABLET | Refills: 1 | Status: SHIPPED | OUTPATIENT
Start: 2019-10-21 | End: 2020-09-18

## 2019-10-21 RX ORDER — OXYCODONE HYDROCHLORIDE AND ACETAMINOPHEN 5; 325 MG/1; MG/1
1 TABLET ORAL EVERY 6 HOURS PRN
Qty: 28 TABLET | Refills: 0 | Status: ON HOLD | OUTPATIENT
Start: 2019-10-21 | End: 2021-01-13

## 2019-10-21 RX ORDER — LIDOCAINE HYDROCHLORIDE 20 MG/ML
INJECTION, SOLUTION INFILTRATION; PERINEURAL PRN
Status: DISCONTINUED | OUTPATIENT
Start: 2019-10-21 | End: 2019-10-21 | Stop reason: SDUPTHER

## 2019-10-21 RX ORDER — LABETALOL HYDROCHLORIDE 5 MG/ML
5 INJECTION, SOLUTION INTRAVENOUS EVERY 10 MIN PRN
Status: DISCONTINUED | OUTPATIENT
Start: 2019-10-21 | End: 2019-10-21 | Stop reason: HOSPADM

## 2019-10-21 RX ORDER — ONDANSETRON 2 MG/ML
4 INJECTION INTRAMUSCULAR; INTRAVENOUS PRN
Status: DISCONTINUED | OUTPATIENT
Start: 2019-10-21 | End: 2019-10-21 | Stop reason: HOSPADM

## 2019-10-21 RX ORDER — MAGNESIUM HYDROXIDE 1200 MG/15ML
LIQUID ORAL CONTINUOUS PRN
Status: COMPLETED | OUTPATIENT
Start: 2019-10-21 | End: 2019-10-21

## 2019-10-21 RX ORDER — MEPERIDINE HYDROCHLORIDE 50 MG/ML
12.5 INJECTION INTRAMUSCULAR; INTRAVENOUS; SUBCUTANEOUS EVERY 5 MIN PRN
Status: DISCONTINUED | OUTPATIENT
Start: 2019-10-21 | End: 2019-10-21 | Stop reason: HOSPADM

## 2019-10-21 RX ORDER — SODIUM CHLORIDE 0.9 % (FLUSH) 0.9 %
10 SYRINGE (ML) INJECTION EVERY 12 HOURS SCHEDULED
Status: DISCONTINUED | OUTPATIENT
Start: 2019-10-21 | End: 2019-10-21 | Stop reason: HOSPADM

## 2019-10-21 RX ORDER — SCOLOPAMINE TRANSDERMAL SYSTEM 1 MG/1
1 PATCH, EXTENDED RELEASE TRANSDERMAL ONCE
Status: DISCONTINUED | OUTPATIENT
Start: 2019-10-21 | End: 2019-10-21 | Stop reason: HOSPADM

## 2019-10-21 RX ADMIN — ONDANSETRON 4 MG: 2 INJECTION INTRAMUSCULAR; INTRAVENOUS at 09:20

## 2019-10-21 RX ADMIN — LIDOCAINE HYDROCHLORIDE 50 MG: 20 INJECTION, SOLUTION INFILTRATION; PERINEURAL at 09:20

## 2019-10-21 RX ADMIN — SODIUM CHLORIDE, POTASSIUM CHLORIDE, SODIUM LACTATE AND CALCIUM CHLORIDE: 600; 310; 30; 20 INJECTION, SOLUTION INTRAVENOUS at 07:49

## 2019-10-21 RX ADMIN — APREPITANT 40 MG: 40 CAPSULE ORAL at 08:39

## 2019-10-21 RX ADMIN — DEXAMETHASONE SODIUM PHOSPHATE 10 MG: 10 INJECTION INTRAMUSCULAR; INTRAVENOUS at 09:20

## 2019-10-21 RX ADMIN — Medication 2 G: at 09:20

## 2019-10-21 RX ADMIN — PROPOFOL 200 MG: 10 INJECTION, EMULSION INTRAVENOUS at 09:20

## 2019-10-21 ASSESSMENT — PULMONARY FUNCTION TESTS
PIF_VALUE: 6
PIF_VALUE: 5
PIF_VALUE: 2
PIF_VALUE: 5
PIF_VALUE: 5
PIF_VALUE: 8
PIF_VALUE: 0
PIF_VALUE: 1
PIF_VALUE: 5
PIF_VALUE: 5
PIF_VALUE: 4
PIF_VALUE: 1
PIF_VALUE: 4
PIF_VALUE: 1
PIF_VALUE: 18
PIF_VALUE: 13
PIF_VALUE: 2
PIF_VALUE: 5
PIF_VALUE: 5
PIF_VALUE: 4
PIF_VALUE: 5
PIF_VALUE: 17
PIF_VALUE: 5
PIF_VALUE: 13
PIF_VALUE: 2
PIF_VALUE: 13
PIF_VALUE: 4
PIF_VALUE: 4
PIF_VALUE: 12
PIF_VALUE: 4
PIF_VALUE: 13
PIF_VALUE: 5
PIF_VALUE: 13
PIF_VALUE: 13
PIF_VALUE: 0
PIF_VALUE: 4
PIF_VALUE: 13
PIF_VALUE: 6
PIF_VALUE: 4
PIF_VALUE: 0
PIF_VALUE: 5
PIF_VALUE: 5
PIF_VALUE: 13
PIF_VALUE: 5
PIF_VALUE: 13
PIF_VALUE: 4
PIF_VALUE: 6
PIF_VALUE: 5
PIF_VALUE: 13
PIF_VALUE: 4
PIF_VALUE: 5
PIF_VALUE: 13
PIF_VALUE: 6
PIF_VALUE: 5
PIF_VALUE: 4
PIF_VALUE: 5
PIF_VALUE: 13
PIF_VALUE: 16
PIF_VALUE: 5
PIF_VALUE: 16
PIF_VALUE: 4
PIF_VALUE: 12

## 2019-10-21 ASSESSMENT — PAIN - FUNCTIONAL ASSESSMENT: PAIN_FUNCTIONAL_ASSESSMENT: 0-10

## 2019-10-21 ASSESSMENT — PAIN SCALES - GENERAL
PAINLEVEL_OUTOF10: 0
PAINLEVEL_OUTOF10: 0

## 2019-11-01 ENCOUNTER — OFFICE VISIT (OUTPATIENT)
Dept: ORTHOPEDIC SURGERY | Age: 59
End: 2019-11-01
Payer: MEDICARE

## 2019-11-01 DIAGNOSIS — M18.11 ARTHRITIS OF CARPOMETACARPAL (CMC) JOINT OF RIGHT THUMB: ICD-10-CM

## 2019-11-01 DIAGNOSIS — M79.644 FINGER PAIN, RIGHT: Primary | ICD-10-CM

## 2019-11-01 PROCEDURE — 29085 APPL CAST HAND&LWR FOREARM: CPT | Performed by: ORTHOPAEDIC SURGERY

## 2019-11-01 PROCEDURE — 99024 POSTOP FOLLOW-UP VISIT: CPT | Performed by: ORTHOPAEDIC SURGERY

## 2019-11-06 ENCOUNTER — OFFICE VISIT (OUTPATIENT)
Dept: FAMILY MEDICINE CLINIC | Age: 59
End: 2019-11-06
Payer: MEDICARE

## 2019-11-06 VITALS
TEMPERATURE: 98.1 F | BODY MASS INDEX: 46.3 KG/M2 | SYSTOLIC BLOOD PRESSURE: 123 MMHG | DIASTOLIC BLOOD PRESSURE: 80 MMHG | HEART RATE: 68 BPM | WEIGHT: 261.38 LBS | OXYGEN SATURATION: 98 %

## 2019-11-06 DIAGNOSIS — R60.0 BILATERAL LEG EDEMA: Primary | ICD-10-CM

## 2019-11-06 LAB
A/G RATIO: 2.5 (ref 1.1–2.2)
ALBUMIN SERPL-MCNC: 4.5 G/DL (ref 3.4–5)
ALP BLD-CCNC: 77 U/L (ref 40–129)
ALT SERPL-CCNC: 13 U/L (ref 10–40)
ANION GAP SERPL CALCULATED.3IONS-SCNC: 17 MMOL/L (ref 3–16)
AST SERPL-CCNC: 14 U/L (ref 15–37)
BILIRUB SERPL-MCNC: <0.2 MG/DL (ref 0–1)
BILIRUBIN, POC: NORMAL
BLOOD URINE, POC: NORMAL
BUN BLDV-MCNC: 17 MG/DL (ref 7–20)
CALCIUM SERPL-MCNC: 9.4 MG/DL (ref 8.3–10.6)
CHLORIDE BLD-SCNC: 103 MMOL/L (ref 99–110)
CLARITY, POC: CLEAR
CO2: 22 MMOL/L (ref 21–32)
COLOR, POC: YELLOW
CREAT SERPL-MCNC: 0.7 MG/DL (ref 0.6–1.2)
GFR AFRICAN AMERICAN: >60
GFR NON-AFRICAN AMERICAN: >60
GLOBULIN: 1.8 G/DL
GLUCOSE BLD-MCNC: 98 MG/DL (ref 70–99)
GLUCOSE URINE, POC: NORMAL
KETONES, POC: NORMAL
LEUKOCYTE EST, POC: NORMAL
NITRITE, POC: NORMAL
PH, POC: 5.5
POTASSIUM SERPL-SCNC: 4.7 MMOL/L (ref 3.5–5.1)
PRO-BNP: 85 PG/ML (ref 0–124)
PROTEIN, POC: NORMAL
SODIUM BLD-SCNC: 142 MMOL/L (ref 136–145)
SPECIFIC GRAVITY, POC: 1.02
TOTAL PROTEIN: 6.3 G/DL (ref 6.4–8.2)
UROBILINOGEN, POC: 0.2

## 2019-11-06 PROCEDURE — 36415 COLL VENOUS BLD VENIPUNCTURE: CPT | Performed by: NURSE PRACTITIONER

## 2019-11-06 PROCEDURE — 99214 OFFICE O/P EST MOD 30 MIN: CPT | Performed by: NURSE PRACTITIONER

## 2019-11-06 PROCEDURE — 81003 URINALYSIS AUTO W/O SCOPE: CPT | Performed by: NURSE PRACTITIONER

## 2019-11-07 RX ORDER — FUROSEMIDE 20 MG/1
20 TABLET ORAL DAILY
Qty: 3 TABLET | Refills: 0 | Status: SHIPPED | OUTPATIENT
Start: 2019-11-07 | End: 2020-06-16

## 2019-11-15 ENCOUNTER — OFFICE VISIT (OUTPATIENT)
Dept: FAMILY MEDICINE CLINIC | Age: 59
End: 2019-11-15
Payer: MEDICARE

## 2019-11-15 VITALS
WEIGHT: 254.25 LBS | HEART RATE: 83 BPM | SYSTOLIC BLOOD PRESSURE: 124 MMHG | BODY MASS INDEX: 45.04 KG/M2 | TEMPERATURE: 98.1 F | OXYGEN SATURATION: 98 % | DIASTOLIC BLOOD PRESSURE: 76 MMHG

## 2019-11-15 DIAGNOSIS — M79.671 RIGHT FOOT PAIN: Primary | ICD-10-CM

## 2019-11-15 PROCEDURE — 99213 OFFICE O/P EST LOW 20 MIN: CPT | Performed by: NURSE PRACTITIONER

## 2019-11-17 ENCOUNTER — HOSPITAL ENCOUNTER (OUTPATIENT)
Age: 59
Discharge: HOME | End: 2019-11-17
Payer: MEDICARE

## 2019-11-17 ENCOUNTER — HOSPITAL ENCOUNTER (OUTPATIENT)
Dept: GENERAL RADIOLOGY | Age: 59
Discharge: HOME | End: 2019-11-17
Payer: MEDICARE

## 2019-11-17 DIAGNOSIS — M79.671 RIGHT FOOT PAIN: ICD-10-CM

## 2019-11-17 PROCEDURE — 73620 X-RAY EXAM OF FOOT: CPT

## 2019-11-18 ENCOUNTER — TELEPHONE (OUTPATIENT)
Dept: FAMILY MEDICINE CLINIC | Age: 59
End: 2019-11-18

## 2019-11-22 ENCOUNTER — OFFICE VISIT (OUTPATIENT)
Dept: ORTHOPEDIC SURGERY | Age: 59
End: 2019-11-22

## 2019-11-22 ENCOUNTER — HOSPITAL ENCOUNTER (OUTPATIENT)
Dept: OCCUPATIONAL THERAPY | Age: 59
Setting detail: THERAPIES SERIES
Discharge: HOME | End: 2019-11-22
Payer: MEDICARE

## 2019-11-22 DIAGNOSIS — M18.11 ARTHRITIS OF CARPOMETACARPAL (CMC) JOINT OF RIGHT THUMB: Primary | ICD-10-CM

## 2019-11-22 PROCEDURE — 97165 OT EVAL LOW COMPLEX 30 MIN: CPT | Performed by: OCCUPATIONAL THERAPIST

## 2019-11-22 PROCEDURE — 99024 POSTOP FOLLOW-UP VISIT: CPT | Performed by: ORTHOPAEDIC SURGERY

## 2019-11-22 PROCEDURE — 97110 THERAPEUTIC EXERCISES: CPT | Performed by: OCCUPATIONAL THERAPIST

## 2019-11-22 PROCEDURE — L3808 WHFO, RIGID W/O JOINTS: HCPCS | Performed by: OCCUPATIONAL THERAPIST

## 2019-12-05 ENCOUNTER — HOSPITAL ENCOUNTER (OUTPATIENT)
Dept: OCCUPATIONAL THERAPY | Age: 59
Setting detail: THERAPIES SERIES
Discharge: HOME | End: 2019-12-05
Payer: MEDICARE

## 2019-12-05 PROCEDURE — 97110 THERAPEUTIC EXERCISES: CPT | Performed by: OCCUPATIONAL THERAPIST

## 2019-12-05 PROCEDURE — 97140 MANUAL THERAPY 1/> REGIONS: CPT | Performed by: OCCUPATIONAL THERAPIST

## 2019-12-11 ENCOUNTER — OFFICE VISIT (OUTPATIENT)
Dept: FAMILY MEDICINE CLINIC | Age: 59
End: 2019-12-11
Payer: MEDICARE

## 2019-12-11 VITALS
HEART RATE: 82 BPM | WEIGHT: 250 LBS | TEMPERATURE: 97.4 F | SYSTOLIC BLOOD PRESSURE: 126 MMHG | OXYGEN SATURATION: 96 % | DIASTOLIC BLOOD PRESSURE: 78 MMHG | BODY MASS INDEX: 44.29 KG/M2

## 2019-12-11 DIAGNOSIS — K58.0 IRRITABLE BOWEL SYNDROME WITH DIARRHEA: ICD-10-CM

## 2019-12-11 DIAGNOSIS — E78.00 PURE HYPERCHOLESTEROLEMIA: ICD-10-CM

## 2019-12-11 DIAGNOSIS — M15.9 PRIMARY OSTEOARTHRITIS INVOLVING MULTIPLE JOINTS: ICD-10-CM

## 2019-12-11 DIAGNOSIS — I10 BENIGN ESSENTIAL HYPERTENSION: Primary | ICD-10-CM

## 2019-12-11 DIAGNOSIS — E55.9 VITAMIN D DEFICIENCY: ICD-10-CM

## 2019-12-11 DIAGNOSIS — J06.9 VIRAL URI: ICD-10-CM

## 2019-12-11 DIAGNOSIS — E66.01 MORBID OBESITY DUE TO EXCESS CALORIES (HCC): ICD-10-CM

## 2019-12-11 LAB
CHOLESTEROL, TOTAL: 181 MG/DL (ref 0–199)
HDLC SERPL-MCNC: 38 MG/DL (ref 40–60)
LDL CHOLESTEROL CALCULATED: 96 MG/DL
TRIGL SERPL-MCNC: 234 MG/DL (ref 0–150)
VITAMIN D 25-HYDROXY: 26.6 NG/ML
VLDLC SERPL CALC-MCNC: 47 MG/DL

## 2019-12-11 PROCEDURE — 99214 OFFICE O/P EST MOD 30 MIN: CPT | Performed by: FAMILY MEDICINE

## 2019-12-11 PROCEDURE — 36415 COLL VENOUS BLD VENIPUNCTURE: CPT | Performed by: FAMILY MEDICINE

## 2019-12-11 RX ORDER — FLUTICASONE PROPIONATE 50 MCG
2 SPRAY, SUSPENSION (ML) NASAL DAILY
Qty: 3 BOTTLE | Refills: 1 | Status: SHIPPED | OUTPATIENT
Start: 2019-12-11 | End: 2021-03-29 | Stop reason: ALTCHOICE

## 2019-12-12 ENCOUNTER — APPOINTMENT (OUTPATIENT)
Dept: OCCUPATIONAL THERAPY | Age: 59
End: 2019-12-12
Payer: MEDICARE

## 2019-12-19 ENCOUNTER — HOSPITAL ENCOUNTER (OUTPATIENT)
Dept: OCCUPATIONAL THERAPY | Age: 59
Setting detail: THERAPIES SERIES
Discharge: HOME | End: 2019-12-19
Payer: MEDICARE

## 2019-12-19 PROCEDURE — 97018 PARAFFIN BATH THERAPY: CPT | Performed by: OCCUPATIONAL THERAPIST

## 2019-12-19 PROCEDURE — 97140 MANUAL THERAPY 1/> REGIONS: CPT | Performed by: OCCUPATIONAL THERAPIST

## 2019-12-19 PROCEDURE — 97110 THERAPEUTIC EXERCISES: CPT | Performed by: OCCUPATIONAL THERAPIST

## 2019-12-27 RX ORDER — LISINOPRIL AND HYDROCHLOROTHIAZIDE 12.5; 1 MG/1; MG/1
TABLET ORAL
Qty: 30 TABLET | Refills: 5 | Status: SHIPPED | OUTPATIENT
Start: 2019-12-27 | End: 2020-05-21

## 2019-12-30 ENCOUNTER — APPOINTMENT (OUTPATIENT)
Dept: OCCUPATIONAL THERAPY | Age: 59
End: 2019-12-30
Payer: MEDICARE

## 2019-12-31 ENCOUNTER — APPOINTMENT (OUTPATIENT)
Dept: OCCUPATIONAL THERAPY | Age: 59
End: 2019-12-31
Payer: MEDICARE

## 2020-03-16 RX ORDER — DICYCLOMINE HYDROCHLORIDE 10 MG/1
CAPSULE ORAL
Qty: 540 CAPSULE | Refills: 0 | Status: SHIPPED | OUTPATIENT
Start: 2020-03-16 | End: 2020-06-10

## 2020-03-16 NOTE — TELEPHONE ENCOUNTER
Future appt scheduled 6/16/20  Last appt 12/11/19  Refilled medication per verbal order from provider.

## 2020-05-21 RX ORDER — LISINOPRIL AND HYDROCHLOROTHIAZIDE 12.5; 1 MG/1; MG/1
TABLET ORAL
Qty: 30 TABLET | Refills: 0 | Status: SHIPPED | OUTPATIENT
Start: 2020-05-21 | End: 2020-07-17 | Stop reason: SDUPTHER

## 2020-06-10 RX ORDER — DICYCLOMINE HYDROCHLORIDE 10 MG/1
CAPSULE ORAL
Qty: 540 CAPSULE | Refills: 0 | Status: SHIPPED | OUTPATIENT
Start: 2020-06-10 | End: 2020-12-17 | Stop reason: SDUPTHER

## 2020-06-10 NOTE — TELEPHONE ENCOUNTER
Refilled medication per verbal order from provider.   Future appt scheduled 06/16/2020  Last appt 12/11/2019

## 2020-06-16 ENCOUNTER — OFFICE VISIT (OUTPATIENT)
Dept: FAMILY MEDICINE CLINIC | Age: 60
End: 2020-06-16
Payer: MEDICARE

## 2020-06-16 VITALS
DIASTOLIC BLOOD PRESSURE: 79 MMHG | BODY MASS INDEX: 43.58 KG/M2 | TEMPERATURE: 97.2 F | SYSTOLIC BLOOD PRESSURE: 121 MMHG | OXYGEN SATURATION: 97 % | WEIGHT: 246 LBS | HEART RATE: 75 BPM

## 2020-06-16 LAB
AMORPHOUS: ABNORMAL /HPF
BACTERIA: ABNORMAL /HPF
BILIRUBIN URINE: NEGATIVE
BLOOD, URINE: ABNORMAL
CLARITY: ABNORMAL
COLOR: YELLOW
EPITHELIAL CELLS, UA: 6 /HPF (ref 0–5)
GLUCOSE URINE: NEGATIVE MG/DL
KETONES, URINE: NEGATIVE MG/DL
LEUKOCYTE ESTERASE, URINE: ABNORMAL
MICROSCOPIC EXAMINATION: YES
NITRITE, URINE: POSITIVE
PH UA: 5 (ref 5–8)
PROTEIN UA: NEGATIVE MG/DL
RBC UA: 2 /HPF (ref 0–4)
SPECIFIC GRAVITY UA: 1.03 (ref 1–1.03)
URINE TYPE: ABNORMAL
UROBILINOGEN, URINE: 0.2 E.U./DL
WBC UA: 17 /HPF (ref 0–5)

## 2020-06-16 PROCEDURE — G8510 SCR DEP NEG, NO PLAN REQD: HCPCS | Performed by: FAMILY MEDICINE

## 2020-06-16 PROCEDURE — G0296 VISIT TO DETERM LDCT ELIG: HCPCS | Performed by: FAMILY MEDICINE

## 2020-06-16 PROCEDURE — 99214 OFFICE O/P EST MOD 30 MIN: CPT | Performed by: FAMILY MEDICINE

## 2020-06-16 ASSESSMENT — PATIENT HEALTH QUESTIONNAIRE - PHQ9
SUM OF ALL RESPONSES TO PHQ QUESTIONS 1-9: 0
1. LITTLE INTEREST OR PLEASURE IN DOING THINGS: 0
SUM OF ALL RESPONSES TO PHQ QUESTIONS 1-9: 0
2. FEELING DOWN, DEPRESSED OR HOPELESS: 0
SUM OF ALL RESPONSES TO PHQ9 QUESTIONS 1 & 2: 0

## 2020-06-16 NOTE — PROGRESS NOTES
medications for this visit.         Patient Active Problem List    Diagnosis Date Noted    Benign essential hypertension 12/04/2018     Priority: High    Irritable bowel syndrome with diarrhea 06/11/2019     Priority: Medium    Migraine with aura and without status migrainosus, not intractable 05/17/2016     Priority: Medium    Allergic sinusitis      Priority: Medium    Vitamin D deficiency 12/20/2016     Priority: Low     Last level: 12/4/2018       Pulmonary nodule 01/25/2016     Priority: Low     4 mm 1/25/2016 - stable 4/19/19      Pure hypercholesterolemia 01/07/2016     Priority: Low     Normal carotid, aorta, and peripheral doppler u/s 12/18 1/30/2019-The 10-year ASCVD risk score (Caren Luis, et al., 2013) is: 10.1%                   Morbid obesity due to excess calories ([facility] [address] 01/04/2016     Priority: Low     Bariatric surgery 2004  Bupropion ineffective      Primary osteoarthritis involving multiple joints      Priority: Low     hands, knees, ankles, feet - Rx -pain management      Arthritis of carpometacarpal (CMC) joint of right thumb 09/26/2019    Smoker 12/04/2018    Calculus of gallbladder without cholecystitis without obstruction 09/13/2016     8/16      Chronic foot pain 01/05/2016     Dr Loly Dillon, podiatrist,      SPENCER (obstructive sleep apnea)      Could not tolerate CPAP 2015         Allergies   Allergen Reactions    Codeine     Morphine And Related     Sulfa Antibiotics      Caused elevation of WBC    Itraconazole      Swelling of feet       Health Maintenance   Topic Date Due    Annual Wellness Visit (AWV)  05/29/2019    Low dose CT lung screening  04/19/2020    Breast cancer screen  05/09/2020    Shingles Vaccine (2 of 3) 02/05/2021 (Originally 4/27/2016)    Potassium monitoring  11/06/2020    Creatinine monitoring  11/06/2020    Lipid screen  12/11/2020    Colon cancer screen colonoscopy  05/13/2023    DTaP/Tdap/Td vaccine (2 - Td) 11/25/2024    Flu vaccine Completed    Pneumococcal 0-64 years Vaccine  Completed    Hepatitis C screen  Addressed    HIV screen  Addressed    Hepatitis A vaccine  Aged Out    Hepatitis B vaccine  Aged Out    Hib vaccine  Aged Out    Meningococcal (ACWY) vaccine  Aged Out       The note was completed using Dragon voice recognition transcription. Every effort was made to ensure accuracy; however, inadvertent  transcription errors may be present despite my best efforts to edit errors. Low Dose CT (LDCT) Lung Screening criteria met   Age 50-69   Pack year smoking >30   Still smoking or less than 15 year since quit   No sign or symptoms of lung cancer   > 11 months since last LDCT     Risks and benefits of lung cancer screening with LDCT scans discussed:    Significance of positive screen - False-positive LDCT results often occur. 95% of all positive results do not lead to a diagnosis of cancer. Usually further imaging can resolve most false-positive results; however, some patients may require invasive procedures. Over diagnosis risk - 10% to 12% of screen-detected lung cancer cases are over diagnosed--that is, the cancer would not have been detected in the patient's lifetime without the screening. Need for follow up screens annually to continue lung cancer screening effectiveness     Risks associated with radiation from annual LDCT- Radiation exposure is about the same as for a mammogram, which is about 1/3 of the annual background radiation exposure from everyday life. Starting screening at age 54 is not likely to increase cancer risk from radiation exposure. Patients with comorbidities resulting in life expectancy of < 10 years, or that would preclude treatment of an abnormality identified on CT, should not be screened due to lack of benefit.     To obtain maximal benefit from this screening, smoking cessation and long-term abstinence from smoking is critical

## 2020-06-16 NOTE — PATIENT INSTRUCTIONS
Please read the healthy family handout that you were given and share it with your family. Please compare this printed medication list with your medications at home to be sure they are the same. If you have any medications that are different please contact us immediately at [phone]. Also review your allergies that we have listed, these may also include medications that you have not been able to tolerate, make sure everything listed is correct. If you have any allergies that are different please contact us immediately at [phone]. What is lung cancer screening? Lung cancer screening is a way in which doctors check the lungs for early signs of cancer in people who have no symptoms of lung cancer. A low-dose CT scan uses much less radiation than a normal CT scan and shows a more detailed image of the lungs than a standard X-ray. The goal of lung cancer screening is to find cancer early, before it has a chance to grow, spread, or cause problems. One large study found that smokers who were screened with low-dose CT scans were less likely to die of lung cancer than those who were screened with standard X-ray. Below is a summary of the things you need to know regarding screening for lung cancer with low-dose computed tomography (LDCT). This is a screening program that involves routine annual screening with LDCT studies of the lung. The LDCTs are done using low-dose radiation that is not thought to increase your cancer risk. If you have other serious medical conditions (other cancers, congestive heart failure) that limit your life expectancy to less than 10 years, you should not undergo lung cancer screening with LDCT. The chance is 20%-60% that the LDCT result will show abnormalities. This would require additional testing which could include repeat imaging or even invasive procedures. Most (about 95%) of \"abnormal\" LDCT results are false in the sense that no lung cancer is ultimately found.

## 2020-06-17 ENCOUNTER — TELEPHONE (OUTPATIENT)
Dept: FAMILY MEDICINE CLINIC | Age: 60
End: 2020-06-17

## 2020-06-17 RX ORDER — NITROFURANTOIN 25; 75 MG/1; MG/1
100 CAPSULE ORAL 2 TIMES DAILY
Qty: 20 CAPSULE | Refills: 0 | Status: SHIPPED | OUTPATIENT
Start: 2020-06-17 | End: 2020-06-27

## 2020-06-30 ENCOUNTER — HOSPITAL ENCOUNTER (OUTPATIENT)
Dept: CT IMAGING | Age: 60
Discharge: HOME | End: 2020-06-30
Payer: MEDICARE

## 2020-06-30 ENCOUNTER — HOSPITAL ENCOUNTER (OUTPATIENT)
Dept: GENERAL RADIOLOGY | Age: 60
Discharge: HOME | End: 2020-06-30
Payer: MEDICARE

## 2020-06-30 ENCOUNTER — HOSPITAL ENCOUNTER (OUTPATIENT)
Age: 60
Discharge: HOME | End: 2020-06-30
Payer: MEDICARE

## 2020-06-30 PROCEDURE — 73560 X-RAY EXAM OF KNEE 1 OR 2: CPT

## 2020-06-30 PROCEDURE — G0297 LDCT FOR LUNG CA SCREEN: HCPCS

## 2020-07-17 RX ORDER — LISINOPRIL AND HYDROCHLOROTHIAZIDE 12.5; 1 MG/1; MG/1
1 TABLET ORAL DAILY
Qty: 30 TABLET | Refills: 5 | Status: SHIPPED | OUTPATIENT
Start: 2020-07-17 | End: 2020-12-17 | Stop reason: SDUPTHER

## 2020-09-17 ENCOUNTER — NURSE TRIAGE (OUTPATIENT)
Dept: OTHER | Facility: CLINIC | Age: 60
End: 2020-09-17

## 2020-09-17 ENCOUNTER — HOSPITAL ENCOUNTER (EMERGENCY)
Age: 60
Discharge: HOME | End: 2020-09-17
Attending: EMERGENCY MEDICINE
Payer: MEDICARE

## 2020-09-17 ENCOUNTER — APPOINTMENT (OUTPATIENT)
Dept: GENERAL RADIOLOGY | Age: 60
End: 2020-09-17
Payer: MEDICARE

## 2020-09-17 ENCOUNTER — TELEPHONE (OUTPATIENT)
Dept: FAMILY MEDICINE CLINIC | Age: 60
End: 2020-09-17

## 2020-09-17 VITALS
TEMPERATURE: 98.1 F | RESPIRATION RATE: 16 BRPM | WEIGHT: 250 LBS | BODY MASS INDEX: 44.3 KG/M2 | DIASTOLIC BLOOD PRESSURE: 65 MMHG | SYSTOLIC BLOOD PRESSURE: 133 MMHG | OXYGEN SATURATION: 98 % | HEART RATE: 65 BPM | HEIGHT: 63 IN

## 2020-09-17 LAB — D DIMER: <200 NG/ML DDU (ref 0–229)

## 2020-09-17 PROCEDURE — 73590 X-RAY EXAM OF LOWER LEG: CPT

## 2020-09-17 PROCEDURE — 36415 COLL VENOUS BLD VENIPUNCTURE: CPT

## 2020-09-17 PROCEDURE — 99283 EMERGENCY DEPT VISIT LOW MDM: CPT

## 2020-09-17 PROCEDURE — 85379 FIBRIN DEGRADATION QUANT: CPT

## 2020-09-17 ASSESSMENT — PAIN DESCRIPTION - LOCATION
LOCATION: LEG

## 2020-09-17 ASSESSMENT — ENCOUNTER SYMPTOMS
WHEEZING: 0
CHOKING: 0
ABDOMINAL PAIN: 0
STRIDOR: 0
COUGH: 0
SHORTNESS OF BREATH: 0
CHEST TIGHTNESS: 0
BACK PAIN: 0

## 2020-09-17 ASSESSMENT — PAIN DESCRIPTION - FREQUENCY: FREQUENCY: CONTINUOUS

## 2020-09-17 ASSESSMENT — PAIN DESCRIPTION - DESCRIPTORS: DESCRIPTORS: OTHER (COMMENT)

## 2020-09-17 ASSESSMENT — PAIN DESCRIPTION - PAIN TYPE
TYPE: ACUTE PAIN

## 2020-09-17 ASSESSMENT — PAIN SCALES - GENERAL
PAINLEVEL_OUTOF10: 2

## 2020-09-17 ASSESSMENT — PAIN DESCRIPTION - PROGRESSION: CLINICAL_PROGRESSION: GRADUALLY WORSENING

## 2020-09-17 ASSESSMENT — PAIN DESCRIPTION - ORIENTATION
ORIENTATION: LEFT
ORIENTATION: LEFT
ORIENTATION: LOWER;LEFT
ORIENTATION: LEFT
ORIENTATION: LEFT

## 2020-09-17 ASSESSMENT — PAIN - FUNCTIONAL ASSESSMENT: PAIN_FUNCTIONAL_ASSESSMENT: PREVENTS OR INTERFERES SOME ACTIVE ACTIVITIES AND ADLS

## 2020-09-17 ASSESSMENT — PAIN DESCRIPTION - ONSET: ONSET: SUDDEN

## 2020-09-17 NOTE — ED PROVIDER NOTES
[facility]      Pt Name: Alejandrina Curry  MRN: [id number]  [place] 1960  Date of evaluation: 9/17/2020  Provider: Carlos Childers MD    [address] [place] [address]       Chief Complaint   Patient presents with    Leg Swelling     Left leg swelling x 4 weeks with pain in lower leg         HISTORY OF PRESENT ILLNESS   (Location/Symptom, Timing/Onset, Context/Setting, Quality, Duration, Modifying Factors, Severity)  Note limiting factors. Alejandrina Curry is a 63 y.o. female who presents to the emergency department     This patient presents with some nondescript left leg pain that started about 4 to 5 days ago. She had taken about a 3-hour trip up to [place] where her relatives are she apparently stayed there several days  She describes the pain to me as if 1 of her grandkids that had accidentally kicked her in bed. She is complaining of pain basically, isolated to the anterior surface  There is no history of malignancy no history of cancer or bone mets no history of direct trauma that would have caused a fracture  She is never had DVT or PE before she is not prothrombotic  Patient denies chest pain shortness of breath  No signs of compartment syndrome no signs of deep leg fasciitis  Patient at this point did undergo a d-dimer as well as a screening x-rays    The history is provided by the patient. Nursing Notes were reviewed. REVIEW OF SYSTEMS    (2-9 systems for level 4, 10 or more for level 5)     Review of Systems   Constitutional: Negative for chills and fever. HENT: Negative for congestion. Respiratory: Negative for cough, choking, chest tightness, shortness of breath, wheezing and stridor. Cardiovascular: Negative for chest pain. Gastrointestinal: Negative for abdominal pain. Genitourinary: Negative for difficulty urinating. Musculoskeletal: Negative for back pain and neck pain.         Mild, diffuse tenderness over the anterior MOUTH 3 TIMES A DAY BEFORE MEALS AS NEEDED FOR DIARRHEA    FLUTICASONE (FLONASE) 50 MCG/ACT NASAL SPRAY    2 sprays by Each Nostril route daily    GABAPENTIN (NEURONTIN) 300 MG CAPSULE    TAKE THREE CAPSULES BY MOUTH THREE TIMES A DAY    LISINOPRIL-HYDROCHLOROTHIAZIDE (PRINZIDE;ZESTORETIC) 10-12.5 MG PER TABLET    Take 1 tablet by mouth daily    MELOXICAM (MOBIC) 15 MG TABLET    Take 15 mg by mouth daily    ONDANSETRON (ZOFRAN) 4 MG TABLET    Take 1 tablet by mouth every 8 hours as needed for Nausea    OXYCODONE-ACETAMINOPHEN (PERCOCET) 5-325 MG PER TABLET    Take 1 tablet by mouth every 6 hours as needed for Pain for up to 7 days. TRAMADOL (ULTRAM) 50 MG TABLET    Take 50 mg by mouth every 8 hours as needed for Pain. Monster Dianne        ALLERGIES     Codeine; Morphine and related; Sulfa antibiotics; and Itraconazole    FAMILY HISTORY       Family History   Problem Relation Age of Onset    High Blood Pressure Mother     High Cholesterol Mother     Diabetes Mother     Stroke Mother     High Blood Pressure Father     High Cholesterol Father     Asthma Father     High Blood Pressure Brother     High Cholesterol Brother     Heart Disease Brother 62    Diabetes Brother           SOCIAL HISTORY       Social History     Socioeconomic History    Marital status:      Spouse name: None    Number of children: None    Years of education: None    Highest education level: None   Occupational History    None   Social Needs    Financial resource strain: None    Food insecurity     Worry: None     Inability: None    Transportation needs     Medical: None     Non-medical: None   Tobacco Use    Smoking status: Current Every Day Smoker     Packs/day: 1.00     Years: 42.00     Pack years: 42.00     Types: Cigarettes    Smokeless tobacco: Never Used   Substance and Sexual Activity    Alcohol use: No     Alcohol/week: 0.0 standard drinks    Drug use: No    Sexual activity: Not Currently   Lifestyle    Physical abdominal tenderness. Musculoskeletal:         General: Tenderness present. No swelling, deformity or signs of injury. Right lower leg: No edema. Left lower leg: No edema. Comments: Patient has some diffuse tenderness throughout the left leg. Patient has no distal numbness or tingling  Patient has no prior history of DVT  No chest pain shortness of breath   Neurological:      Mental Status: She is alert and oriented to person, place, and time. GCS: GCS eye subscore is 4. GCS verbal subscore is 5. GCS motor subscore is 6. Cranial Nerves: No cranial nerve deficit. Sensory: No sensory deficit. Motor: No abnormal muscle tone. Coordination: Coordination normal.      Deep Tendon Reflexes: Reflexes normal.   Psychiatric:         Behavior: Behavior normal.         DIAGNOSTIC RESULTS     EKG: All EKG's are interpreted by the Emergency Department Physician who either signs or Co-signs this chart in the absence of a cardiologist.        RADIOLOGY:   Non-plain film images such as CT, Ultrasound and MRI are read by the radiologist. Plain radiographic images are visualized and preliminarily interpreted by the emergency physician with the below findings:        Interpretation per the Radiologist below, if available at the time of this note:    XR TIBIA FIBULA LEFT (2 VIEWS)   Final Result   No acute osseous abnormality.                  LABS:  Results for orders placed or performed during the hospital encounter of 09/17/20   D-Dimer, Quantitative   Result Value Ref Range    D-Dimer, Quant <200 0 - 229 ng/mL DDU            EMERGENCY DEPARTMENT COURSE and DIFFERENTIAL DIAGNOSIS/MDM:     Vitals:    09/17/20 1358 09/17/20 1500 09/17/20 1600 09/17/20 1645   BP: 132/79 135/77 133/76 128/68   Pulse: 68 67 65 60   Resp: 16 16 16 16   Temp: 98.1 °F (36.7 °C)      TempSrc: Oral      SpO2: 97% 99% 98% 97%   Weight: 250 lb (113.4 kg)      Height: 5' 3\" (1.6 m)              MDM      REASSESSMENT CRITICAL CARE TIME     CONSULTS:  None      PROCEDURES:     Procedures    MEDICATIONS GIVEN THIS VISIT:  Medications - No data to display     FINAL IMPRESSION      1. Acute leg pain, left            DISPOSITION/PLAN   DISPOSITION Decision To Discharge 09/17/2020 06:04:55 PM      PATIENT REFERRED TO:  Indigo Echavarria MD  [address] Mariann Thomas [address] [place] [address]   [phone]    In 3 days      [id number] [facility] Emergency Department  [facility],[address]  [phone]    If symptoms worsen      DISCHARGE MEDICATIONS:  New Prescriptions    No medications on file       Controlled Substances Monitoring  No flowsheet data found. (Please note that portions of this note were completed with a voice recognition program.  Efforts were made to edit the dictations but occasionally words are mis-transcribed.)    Patient was advised to return to the Emergency Department if there was any worsening.     Nicolas Sesay MD (electronically signed)  Attending Emergency Physician         Jerona Cogan, MD  09/17/20 [address], [place]  09/17/20 1771

## 2020-09-17 NOTE — TELEPHONE ENCOUNTER
If patient's swelling and pain has worsened then I would recommend her going to the emergency department for prompt evaluation and possible DVT studies to rule out clot. Please notify patient.

## 2020-09-17 NOTE — TELEPHONE ENCOUNTER
----- Message from India Rubalcava sent at 9/17/2020 11:16 AM EDT -----  Subject: Appointment Request    Reason for Call: Immediate Return from RN Triage    QUESTIONS  Type of Appointment? New Patient/New to Provider  Reason for appointment request? No appointments available during search  Additional Information for Provider? NEEDS TO BE SEEN LEFT LEG PAIN SHOULD   BE A NEW PATIENT Delma Garnett  ---------------------------------------------------------------------------  --------------  CALL BACK INFO  What is the best way for the office to contact you? OK to leave message on   voicemail  Preferred Call Back Phone Number? [phone]  ---------------------------------------------------------------------------  --------------  SCRIPT ANSWERS  Patient needs to be seen today? Yes  Patient needs to be seen today or tomorrow? Yes  Patient needs to be seen within 5 days? Yes  Patient can be seen for a routine visit? Yes  Nurse Name? Ro Soliman  (Did RN indicate the need for Red Scheduling?)? No  Have you been diagnosed with COVID-19 (Coronavirus)   tested for COVID-19 (Coronavirus)   or told that you are suspected of having COVID-19 (Coronavirus)? No  Have you had a fever or taken medication to treat a fever within the past   3 days? No  Have you had a cough   shortness of breath or flu-like symptoms within the past 3 days? No  Do you currently have flu-like symptoms including fever or chills   cough   shortness of breath   or difficulty breathing   or new loss of taste or smell? No  (Service Expert  click yes below to proceed with [employer] As Usual   Scheduling)?  Yes

## 2020-09-17 NOTE — TELEPHONE ENCOUNTER
The patient called and her left leg is painful and her ankle is swollen. This has been going on for 3-4 weeks. Her problem list does not show any history of blood clots. She has had edema in the past. She does see pain management and takes Percocet and Tramadol already.  Please advise rc

## 2020-09-18 ENCOUNTER — OFFICE VISIT (OUTPATIENT)
Dept: FAMILY MEDICINE CLINIC | Age: 60
End: 2020-09-18
Payer: MEDICARE

## 2020-09-18 ENCOUNTER — TELEPHONE (OUTPATIENT)
Dept: FAMILY MEDICINE CLINIC | Age: 60
End: 2020-09-18

## 2020-09-18 VITALS
OXYGEN SATURATION: 97 % | DIASTOLIC BLOOD PRESSURE: 66 MMHG | HEART RATE: 70 BPM | TEMPERATURE: 98.1 F | BODY MASS INDEX: 42.07 KG/M2 | WEIGHT: 237.5 LBS | SYSTOLIC BLOOD PRESSURE: 99 MMHG

## 2020-09-18 PROCEDURE — 99214 OFFICE O/P EST MOD 30 MIN: CPT | Performed by: NURSE PRACTITIONER

## 2020-09-18 RX ORDER — OXYCODONE AND ACETAMINOPHEN 7.5; 325 MG/1; MG/1
TABLET ORAL
COMMUNITY
Start: 2020-08-21

## 2020-09-18 ASSESSMENT — ENCOUNTER SYMPTOMS
ABDOMINAL PAIN: 0
WHEEZING: 0
DIARRHEA: 0
COUGH: 0
VOMITING: 0
SORE THROAT: 0
RHINORRHEA: 0
SHORTNESS OF BREATH: 0
NAUSEA: 0

## 2020-09-18 NOTE — PATIENT INSTRUCTIONS
Please read the healthy family handout that you were given and share it with your family. Please compare this printed medication list with your medications at home to be sure they are the same. If you have any medications that are different please contact us immediately at [phone]. Also review your allergies that we have listed, these may also include medications that you have not been able to tolerate, make sure everything listed is correct. If you have any allergies that are different please contact us immediately at [phone]. Patient Education        Leg Pain: Care Instructions  Your Care Instructions  Many things can cause leg pain. Too much exercise or overuse can cause a muscle cramp (or charley horse). You can get leg cramps from not eating a balanced diet that has enough potassium, calcium, and other minerals. If you do not drink enough fluids or are taking certain medicines, you may develop leg cramps. Other causes of leg pain include injuries, blood flow problems, nerve damage, and twisted and enlarged veins (varicose veins). You can usually ease pain with self-care. Your doctor may recommend that you rest your leg and keep it elevated. Follow-up care is a key part of your treatment and safety. Be sure to make and go to all appointments, and call your doctor if you are having problems. It's also a good idea to know your test results and keep a list of the medicines you take. How can you care for yourself at home? · Take pain medicines exactly as directed. ? If the doctor gave you a prescription medicine for pain, take it as prescribed. ? If you are not taking a prescription pain medicine, ask your doctor if you can take an over-the-counter medicine. · Take any other medicines exactly as prescribed. Call your doctor if you think you are having a problem with your medicine. · Rest your leg while you have pain, and avoid standing for long periods of time.   · Prop up your leg at or professional. [address] any warranty or liability for your use of this information.

## 2020-09-18 NOTE — PROGRESS NOTES
CHIEF COMPLAINT  Chief Complaint   Patient presents with   [facility] follow up        HPI   Marycarmen Melendez is a 63 y.o. female who presents to the office complaining of left leg pain and swelling. Patient reports that she noticed symptoms of pain 3 to 4 weeks ago when she was at her great niece's house. Patient reports that it almost fell like something hit the front of her leg but she knows it did not. Patient reports that she did travel 3 hours there and 3 hours back denies any history of DVT or PE. Patient reports that throughout the day leg will become very swollen. Patient reports that her right knee does knee replacement so she tends to lean on her left side more. Patient does use a cane to ambulate. Patient denies any numbness or tingling in extremities. No unilateral weakness. Patient reports history of multiple surgeries (7) to left foot and ankle for tendon replacement years ago. Patient denies any fever or chills. No nausea, vomiting, diarrhea. No chest pain, tightness, palpitations or shortness of breath. No dizziness or lightheadedness. Patient is treated through pain management and reports taking all prescribed medications as directed with minimal relief of pain. Patient reports that pain is fine while sitting however it is worse when she is up and standing on the leg. No other complaints, modifying factors or associated symptoms. Nursing notes reviewed.    Past Medical History:   Diagnosis Date    Chest pain 11/10    negative myoview stress    Hematuria 10/2016    neg cysto, Dr Sung Reid    Hypertension     PONV (postoperative nausea and vomiting)     Primary osteoarthritis involving multiple joints     hands, Dr Blake Rucker Routine health maintenance      TAC 5/13    Sleep apnea     did not tolerate CPAP    Tarsal tunnel syndrome 2014     Past Surgical History:   Procedure Laterality Date    ARTHROPLASTY Right 10/21/2019    RIGHT THUMB CARPOMETACARPAL ARTHROPLASTY -BLOCK- -SLEEP APNEA- performed by Curtis Curtis MD at [facility]  2004    stomach stapling    FOOT TENDON SURGERY Left 12/9/14    plantar fasciotomy, tendon transfer, gastrocnemius recession/cortisone    HAND TENDON SURGERY Right 10/21/2019    FLEXOR CARPI RADIALIS TENDON INTERPOSITION performed by Curtis Curtis MD at [facility] [address] ARTHROSCOPY      OTHER SURGICAL HISTORY  10/14/2016    cystoscopy, bilateral retrogrades, urethral dilation    URETHRAL STRICTURE DILATATION  7/14     Family History   Problem Relation Age of Onset    High Blood Pressure Mother     High Cholesterol Mother     Diabetes Mother     Stroke Mother     High Blood Pressure Father     High Cholesterol Father     Asthma Father     High Blood Pressure Brother     High Cholesterol Brother     Heart Disease Brother 62    Diabetes Brother      Social History     Socioeconomic History    Marital status:      Spouse name: Not on file    Number of children: Not on file    Years of education: Not on file    Highest education level: Not on file   Occupational History    Not on file   Social Needs    Financial resource strain: Not on file    Food insecurity     Worry: Not on file     Inability: Not on file    Transportation needs     Medical: Not on file     Non-medical: Not on file   Tobacco Use    Smoking status: Current Every Day Smoker     Packs/day: 1.00     Years: 42.00     Pack years: 42.00     Types: Cigarettes    Smokeless tobacco: Never Used   Substance and Sexual Activity    Alcohol use: No     Alcohol/week: 0.0 standard drinks    Drug use: No    Sexual activity: Not Currently   Lifestyle    Physical activity     Days per week: Not on file     Minutes per session: Not on file    Stress: Not on file   Relationships    Social connections     Talks on phone: Not on file     Gets together: Not on file     Attends Religion service: Not on file     Active member of club or organization: Not on file     Attends meetings of clubs or organizations: Not on file     Relationship status: Not on file    Intimate partner violence     Fear of current or ex partner: Not on file     Emotionally abused: Not on file     Physically abused: Not on file     Forced sexual activity: Not on file   Other Topics Concern    Not on file   Social History Narrative    Not on file     Current Outpatient Medications   Medication Sig Dispense Refill    oxyCODONE-acetaminophen (PERCOCET) 7.5-325 MG per tablet       Cholecalciferol (VITAMIN D3) 125 MCG (5000 UT) TABS Take by mouth      Simethicone (GAS RELIEF 125 MAX ST PO) Take by mouth      lisinopril-hydroCHLOROthiazide (PRINZIDE;ZESTORETIC) 10-12.5 MG per tablet Take 1 tablet by mouth daily 30 tablet 5    dicyclomine (BENTYL) 10 MG capsule TAKE 1 OR 2 CAPSULES BY MOUTH 3 TIMES A DAY BEFORE MEALS AS NEEDED FOR DIARRHEA 540 capsule 0    fluticasone (FLONASE) 50 MCG/ACT nasal spray 2 sprays by Each Nostril route daily 3 Bottle 1    meloxicam (MOBIC) 15 MG tablet Take 15 mg by mouth daily      aspirin 81 MG tablet Take 81 mg by mouth daily      traMADol (ULTRAM) 50 MG tablet Take 50 mg by mouth every 8 hours as needed for Pain. Chasity Sears gabapentin (NEURONTIN) 300 MG capsule TAKE THREE CAPSULES BY MOUTH THREE TIMES A  capsule 1    oxyCODONE-acetaminophen (PERCOCET) 5-325 MG per tablet Take 1 tablet by mouth every 6 hours as needed for Pain for up to 7 days. 28 tablet 0     No current facility-administered medications for this visit. Allergies   Allergen Reactions    Codeine     Morphine And Related     Sulfa Antibiotics      Caused elevation of WBC    Itraconazole      Swelling of feet       REVIEW OF SYSTEMS  Review of Systems   Constitutional: Negative for activity change, appetite change, chills and fever. HENT: Negative for congestion, rhinorrhea and sore throat.     Eyes: Negative for visual disturbance. Respiratory: Negative for cough, shortness of breath and wheezing. Cardiovascular: Positive for leg swelling. Negative for chest pain. Gastrointestinal: Negative for abdominal pain, diarrhea, nausea and vomiting. Genitourinary: Negative for dysuria, frequency, hematuria and urgency. Musculoskeletal: Positive for arthralgias and gait problem. Negative for myalgias. Skin: Negative for rash. Neurological: Negative for dizziness, weakness, light-headedness, numbness and headaches. Psychiatric/Behavioral: Negative for sleep disturbance. PHYSICAL EXAM  BP 99/66   Pulse 70   Temp 98.1 °F (36.7 °C) (Oral)   Wt 237 lb 8 oz (107.7 kg)   SpO2 97%   BMI 42.07 kg/m²   Physical Exam  Vitals signs reviewed. Constitutional:       General: She is not in acute distress. Appearance: Normal appearance. She is well-developed. She is not diaphoretic. HENT:      Head: Normocephalic and atraumatic. Right Ear: Tympanic membrane normal.      Left Ear: Tympanic membrane normal.      Nose: Nose normal.      Mouth/Throat:      Mouth: Mucous membranes are moist.      Pharynx: Oropharynx is clear. No oropharyngeal exudate or posterior oropharyngeal erythema. Eyes:      General:         Right eye: No discharge. Left eye: No discharge. Pupils: Pupils are equal, round, and reactive to light. Neck:      Musculoskeletal: Normal range of motion and neck supple. Vascular: No JVD. Cardiovascular:      Rate and Rhythm: Normal rate and regular rhythm. Pulses: Normal pulses. Pulmonary:      Effort: Pulmonary effort is normal. No respiratory distress. Breath sounds: No stridor. No wheezing, rhonchi or rales. Chest:      Chest wall: No tenderness. Abdominal:      General: Bowel sounds are normal. There is no distension. Palpations: Abdomen is soft. Tenderness: There is no abdominal tenderness. There is no guarding.    Musculoskeletal:         General: Tenderness present. No swelling or deformity. Left ankle: She exhibits swelling. She exhibits normal range of motion. Left lower leg: Edema present. Legs:    Skin:     General: Skin is warm and dry. Capillary Refill: Capillary refill takes less than 2 seconds. Coloration: Skin is not pale. Findings: No bruising, lesion or rash. Neurological:      General: No focal deficit present. Mental Status: She is alert and oriented to person, place, and time. Psychiatric:         Mood and Affect: Mood normal.         Behavior: Behavior normal.          RADIOLOGY  Xr Tibia Fibula Left (2 Views)    Result Date: 9/17/2020  EXAMINATION: 4 XRAY VIEWS OF THE LEFT TIBIA AND FIBULA 9/17/2020 5:30 pm COMPARISON: 06/30/2020 HISTORY: ORDERING SYSTEM PROVIDED HISTORY: pain TECHNOLOGIST PROVIDED HISTORY: Reason for exam:->pain Reason for Exam: Left leg swelling and pain Acuity: Acute Type of Exam: Initial FINDINGS: There is no evidence of acute fracture. There is normal alignment. No acute joint abnormality. No focal osseous lesion. No focal soft tissue abnormality. No acute osseous abnormality. ASSESSMENT/PLAN:   1. Left leg pain  Patient presents to the office today for follow-up and evaluation of left lower leg pain. Patient was seen in the emergency department yesterday and had imaging obtained which revealed no acute osseous abnormalities. Patient reports that pain started approximately 3 to 4 weeks ago. Patient reports that pain is in the shin of her left leg. Patient reports that throughout the day leg becomes very swollen. Patient denies any redness, fever, chills, nausea vomiting. Patient denies any dizziness, lightheadedness, chest pain or shortness of breath. Patient reports that she does need a right knee replacement and has been walking with most of her weight onto the left leg. Patient does report using a cane.   Patient reports that prior to traveling to her family's house she did sit in the car for 3 hours to and from destination. No history DVT. Patient does report tobacco use. D-dimer obtained in the ER negative. Patient denies any calf pain or tenderness. Patient reports that pain is only present when she is standing and walking. Patient sees pain management and currently takes Percocet, but gabapentin, meloxicam and tramadol. Patient reports that if she is up for long periods of time even taking those medications does not help. Patient reports years ago she had multiple surgeries to her left ankle and foot for tendon repair. Patient has seen orthopedics in the past therefore I did recommend her following up with them for reevaluation and possible further testing. SAYDA discussed in detail with patient. I encourage patient to perform leg stretches as well. Patient will follow-up for any new or worsening symptoms. The note was completed using Dragon voice recognition transcription. Every effort was made to ensure accuracy; however, inadvertent  transcription errors may be present despite my best efforts to edit errors.     RUBEN Cash - CNP

## 2020-09-18 NOTE — TELEPHONE ENCOUNTER
----- Message from Abbi Milka sent at 9/18/2020  9:27 AM EDT -----  Subject: Appointment Request    Reason for Call: Urgent (Patient Request) Hospital Follow Up    QUESTIONS  Type of Appointment? New Patient/New to Provider  Reason for appointment request? No appointments available during search  Additional Information for Provider? Patient is a previous patient of Dr. Braydon Jaime and is trying to establish care with Gabi Blount or another provider   at the practice. This would be to follow up on her left leg pain. She went   to the ER last night for the issue and is requesting an appt.   ---------------------------------------------------------------------------  --------------  CALL BACK INFO  What is the best way for the office to contact you? OK to leave message on   voicemail  Preferred Call Back Phone Number? [phone]  ---------------------------------------------------------------------------  --------------  SCRIPT ANSWERS  Relationship to Patient? Self  Appointment reason? Well Care/Follow Ups  Select a Well Care/Follow Ups appointment reason? [facility]   [facility]  (Patient requests to see provider urgently. )? Yes  (Has the patient been discharged from the hospital within 2 business days   AND does not have a Telephone Encounter  Follow Up From [address] [place] [address]   documented in [address]?)? No  Do you have any questions for your primary care provider that need to be   answered prior to your appointment? (Use RN Triage if question pertains to   anything on the red flag list)? No  (Patient needs follow up visit after hospital discharge) Book first   available appointment within 7 days OF DISCHARGE   if no appt   proceed to book the next available time slot within 14 days YESSENIA Eubanks 9 Message to Provider. [address] Follow Up appointment cannot be   booked beyond 14 Days and should result in a Message to Provider. ?  Yes  Have you been diagnosed with   tested for   or told that you are suspected of having COVID-19 (Coronavirus)? No  Have you had a fever or taken medication to treat a fever within the past   3 days? No  Have you had a cough   shortness of breath or flu-like symptoms within the past 3 days? No  Do you currently have flu-like symptoms including fever or chills   cough   shortness of breath   or difficulty breathing   or new loss of taste or smell? No  (Service Expert  click yes below to proceed with Piggybackr As Usual   Scheduling)?  Yes

## 2020-09-21 ENCOUNTER — OFFICE VISIT (OUTPATIENT)
Dept: ORTHOPEDIC SURGERY | Age: 60
End: 2020-09-21
Payer: MEDICARE

## 2020-09-21 VITALS — HEIGHT: 64 IN | WEIGHT: 230 LBS | BODY MASS INDEX: 39.27 KG/M2

## 2020-09-21 PROCEDURE — MISC250 COMPRESSION STOCKING: Performed by: ORTHOPAEDIC SURGERY

## 2020-09-21 PROCEDURE — 99213 OFFICE O/P EST LOW 20 MIN: CPT | Performed by: ORTHOPAEDIC SURGERY

## 2020-09-21 NOTE — PROGRESS NOTES
Chief Complaint    Leg Pain (lt lower leg pain ongoing about a month ago, getting worse, seen in ER and by PCP)      History of Present Illness:  Veronica Isabel is a 63 y.o. female presents the office today for a new problem. Patient is here with a chief complaint of left lower leg pain for approximately a month. She did go to the urgent care over the weekend. They did a d-dimer which was within normal limits. They did not feel that she had a DVT. Pain is all concentrated over the distal one third of the anterior tibia. She denies any calf pain. No recent injury or trauma. She does complain of lower extremity swelling. Her swelling decreases at night. In the morning she has less pain in her lower leg and as she does more on her feet her swelling increases and also does her pain. Patient is sent in by her primary care provider, EKATERINA Benton for orthopedic consultation.     Pain Assessment  Location of Pain: Leg  Location Modifiers: Left  Severity of Pain: 6  Frequency of Pain: Intermittent  Aggravating Factors: Walking, Standing  Limiting Behavior: Some  Result of Injury: No  Work-Related Injury: No  Are there other pain locations you wish to document?: No]       Medical History:  Past Medical History:   Diagnosis Date    Chest pain 11/10    negative myoview stress    Hematuria 10/2016    neg cysto, Dr Anais Gee    Hypertension     PONV (postoperative nausea and vomiting)     Primary osteoarthritis involving multiple joints     hands, Dr Isaak Cárdenas Routine health maintenance      TAC 5/13    Sleep apnea     did not tolerate CPAP    Tarsal tunnel syndrome 2014     Patient Active Problem List    Diagnosis Date Noted    Primary osteoarthritis involving multiple joints      Priority: High    Migraine with aura and without status migrainosus, not intractable 05/17/2016     Priority: Medium    Morbid obesity due to excess calories ([facility] [address] 01/04/2016     Priority: Medium    Pulmonary nodule 01/25/2016     Priority: Low    Pure hypercholesterolemia 01/07/2016     Priority: Low    Allergic sinusitis      Priority: Low    Arthritis of carpometacarpal (CMC) joint of right thumb 09/26/2019    Irritable bowel syndrome with diarrhea 06/11/2019    Benign essential hypertension 12/04/2018    Smoker 12/04/2018    Vitamin D deficiency 12/20/2016    Calculus of gallbladder without cholecystitis without obstruction 09/13/2016    Chronic foot pain 01/05/2016    SPENCER (obstructive sleep apnea)      Past Surgical History:   Procedure Laterality Date    ARTHROPLASTY Right 10/21/2019    RIGHT THUMB CARPOMETACARPAL ARTHROPLASTY -BLOCK- -SLEEP APNEA- performed by Dewayne Barron MD at [place] [address]    stomach stapling    FOOT TENDON SURGERY Left 12/9/14    plantar fasciotomy, tendon transfer, gastrocnemius recession/cortisone    HAND TENDON SURGERY Right 10/21/2019    FLEXOR CARPI RADIALIS TENDON INTERPOSITION performed by Dewayne Barron MD at [address] [place] [address] ARTHROSCOPY      OTHER SURGICAL HISTORY  10/14/2016    cystoscopy, bilateral retrogrades, urethral dilation    URETHRAL STRICTURE DILATATION  7/14     Family History   Problem Relation Age of Onset    High Blood Pressure Mother     High Cholesterol Mother     Diabetes Mother     Stroke Mother     High Blood Pressure Father     High Cholesterol Father     Asthma Father     High Blood Pressure Brother     High Cholesterol Brother     Heart Disease Brother 62    Diabetes Brother      Social History     Socioeconomic History    Marital status:      Spouse name: None    Number of children: None    Years of education: None    Highest education level: None   Occupational History    None   Social Needs    Financial resource strain: None    Food insecurity     Worry: None     Inability: None    Transportation needs     Medical: None     Non-medical: None   Tobacco Use    Smoking status: Current Every Day Smoker     Packs/day: 1.00     Years: 42.00     Pack years: 42.00     Types: Cigarettes    Smokeless tobacco: Never Used   Substance and Sexual Activity    Alcohol use: No     Alcohol/week: 0.0 standard drinks    Drug use: No    Sexual activity: Not Currently   Lifestyle    Physical activity     Days per week: None     Minutes per session: None    Stress: None   Relationships    Social connections     Talks on phone: None     Gets together: None     Attends Worship service: None     Active member of club or organization: None     Attends meetings of clubs or organizations: None     Relationship status: None    Intimate partner violence     Fear of current or ex partner: None     Emotionally abused: None     Physically abused: None     Forced sexual activity: None   Other Topics Concern    None   Social History Narrative    None     Current Outpatient Medications   Medication Sig Dispense Refill    oxyCODONE-acetaminophen (PERCOCET) 7.5-325 MG per tablet       Cholecalciferol (VITAMIN D3) 125 MCG (5000 UT) TABS Take by mouth      Simethicone (GAS RELIEF 125 MAX ST PO) Take by mouth      lisinopril-hydroCHLOROthiazide (PRINZIDE;ZESTORETIC) 10-12.5 MG per tablet Take 1 tablet by mouth daily 30 tablet 5    dicyclomine (BENTYL) 10 MG capsule TAKE 1 OR 2 CAPSULES BY MOUTH 3 TIMES A DAY BEFORE MEALS AS NEEDED FOR DIARRHEA 540 capsule 0    fluticasone (FLONASE) 50 MCG/ACT nasal spray 2 sprays by Each Nostril route daily 3 Bottle 1    oxyCODONE-acetaminophen (PERCOCET) 5-325 MG per tablet Take 1 tablet by mouth every 6 hours as needed for Pain for up to 7 days. 28 tablet 0    meloxicam (MOBIC) 15 MG tablet Take 15 mg by mouth daily      aspirin 81 MG tablet Take 81 mg by mouth daily      traMADol (ULTRAM) 50 MG tablet Take 50 mg by mouth every 8 hours as needed for Pain. Sd Stevens gabapentin (NEURONTIN) 300 MG capsule TAKE THREE CAPSULES BY MOUTH THREE TIMES A  capsule 1 fractures or dislocations. Patient does appear to have advanced osteoarthritis of the medial compartment. Impression:  Encounter Diagnoses   Name Primary?  Pain and swelling of left lower leg Yes    Primary osteoarthritis of left knee        Office Procedures:  Orders Placed This Encounter   Procedures    Compression Stocking $30     Patient was supplied a Compression Sleeve. This retail item was supplied to provide functional support and assist in controlling swelling in the lower extremities. Verbal and written instructions for the use of and application of this item were provided. The patient was educated and fit by a healthcare professional with expert knowledge and specialization in brace application. They were instructed to contact the office immediately should the equipment result in increased pain, decreased sensation, increased swelling or worsening of the condition. Treatment Plan:  I discussed with the patient the nature of osteoarthritis of the knee. We talked about treatment of arthritis and the various options that are involved with this. The patient understands that the treatments can vary from essentially doing nothing to a total joint replacement arthroplasty for arthritis. I then went on to describe the utilization of glucosamine and chondroitin sulfate as a joint nutrition product. We talked about the fact that this is essentially a joint vitamin with typically minimal side effects. We also talked about utilization of prescription over-the-counter anti-inflammatory medications as the next option. We also discussed the possibility of brace wear or orthotic wear if the patient has significant varus alignment. We then went on to discuss the possibility of Visco supplementation with hyaluronate products.  We talked about the typical course of this type of treatment and the fact that often times in the treatment for significant arthritis, this is successful less than half the time. We also talked about the corticosteroid injections and the fact that this can give a brief window of relief, but does not cure the problem; in fact, the pain often has a rebound effect in 6-10 weeks after the steroid has worn off. We also discussed arthroscopy surgery in attempts to debride the joint, but the fact that this is relatively unreliable treatment in the face of significant arthritis. It can occasionally be used, particularly if there is significant meniscus pathology. Lastly we discussed total joint replacement arthroplasty as the final and definitive step in treatment of arthritis. Patient realizes the magnitude of this type of treatment as well as having voiced a general understanding to the duration of the prosthesis. The patient voiced understanding to these continuum of treatment options. Patient does have advanced arthritis in her knee and lower extremity swelling. I believe she is suffering more from peripheral vascular disease/venous stasis. She would benefit from compression stockings. Patient will discuss her lower extremity swelling with her primary care physician. She may benefit from a diuretic. If her symptoms increase she should follow-up in the office.

## 2020-09-28 ENCOUNTER — OFFICE VISIT (OUTPATIENT)
Dept: FAMILY MEDICINE CLINIC | Age: 60
End: 2020-09-28
Payer: MEDICARE

## 2020-09-28 VITALS
DIASTOLIC BLOOD PRESSURE: 70 MMHG | OXYGEN SATURATION: 96 % | SYSTOLIC BLOOD PRESSURE: 107 MMHG | WEIGHT: 240 LBS | TEMPERATURE: 98.1 F | HEART RATE: 65 BPM | BODY MASS INDEX: 41.2 KG/M2

## 2020-09-28 PROCEDURE — 99214 OFFICE O/P EST MOD 30 MIN: CPT | Performed by: NURSE PRACTITIONER

## 2020-09-28 ASSESSMENT — ENCOUNTER SYMPTOMS
COUGH: 0
VOMITING: 0
NAUSEA: 0
SORE THROAT: 0
WHEEZING: 0
RHINORRHEA: 0
DIARRHEA: 0
SHORTNESS OF BREATH: 0
ABDOMINAL PAIN: 0

## 2020-09-28 NOTE — PATIENT INSTRUCTIONS
Please read the healthy family handout that you were given and share it with your family. Please compare this printed medication list with your medications at home to be sure they are the same. If you have any medications that are different please contact us immediately at [phone]. Also review your allergies that we have listed, these may also include medications that you have not been able to tolerate, make sure everything listed is correct. If you have any allergies that are different please contact us immediately at [phone]. Patient Education        Stopping Smoking: Care Instructions  Your Care Instructions     Cigarette smokers crave the nicotine in cigarettes. Giving it up is much harder than simply changing a habit. Your body has to stop craving the nicotine. It is hard to quit, but you can do it. There are many tools that people use to quit smoking. You may find that combining tools works best for you. There are several steps to quitting. First you get ready to quit. Then you get support to help you. After that, you learn new skills and behaviors to become a nonsmoker. For many people, a necessary step is getting and using medicine. Your doctor will help you set up the plan that best meets your needs. You may want to attend a smoking cessation program to help you quit smoking. When you choose a program, look for one that has proven success. Ask your doctor for ideas. You will greatly increase your chances of success if you take medicine as well as get counseling or join a cessation program.  Some of the changes you feel when you first quit tobacco are uncomfortable. Your body will miss the nicotine at first, and you may feel short-tempered and grumpy. You may have trouble sleeping or concentrating. Medicine can help you deal with these symptoms. You may struggle with changing your smoking habits and rituals. The last step is the tricky one:  Be prepared for the smoking urge to continue for a time. This is a lot to deal with, but keep at it. You will feel better. Follow-up care is a key part of your treatment and safety. Be sure to make and go to all appointments, and call your doctor if you are having problems. It's also a good idea to know your test results and keep a list of the medicines you take. How can you care for yourself at home? · Ask your family, friends, and coworkers for support. You have a better chance of quitting if you have help and support. · Join a support group, such as Nicotine Anonymous, for people who are trying to quit smoking. · Consider signing up for a smoking cessation program, such as the American Lung Association's Freedom from Smoking program.  · Get text messaging support. Go to the website at www.smokefree. gov to sign up for the [facility] program.  · Set a quit date. Pick your date carefully so that it is not right in the middle of a big deadline or stressful time. Once you quit, do not even take a puff. Get rid of all ashtrays and lighters after your last cigarette. Clean your house and your clothes so that they do not smell of smoke. · Learn how to be a nonsmoker. Think about ways you can avoid those things that make you reach for a cigarette. ? Avoid situations that put you at greatest risk for smoking. For some people, it is hard to have a drink with friends without smoking. For others, they might skip a coffee break with coworkers who smoke. ? Change your daily routine. Take a different route to work or eat a meal in a different place. · Cut down on stress. Calm yourself or release tension by doing an activity you enjoy, such as reading a book, taking a hot bath, or gardening. · Talk to your doctor or pharmacist about nicotine replacement therapy, which replaces the nicotine in your body. You still get nicotine but you do not use tobacco. Nicotine replacement products help you slowly reduce the amount of nicotine you need.  These products come in several forms, many of them available over-the-counter:  ? Nicotine patches  ? Nicotine gum and lozenges  ? Nicotine inhaler  · Ask your doctor about bupropion (Wellbutrin) or varenicline (Chantix), which are prescription medicines. They do not contain nicotine. They help you by reducing withdrawal symptoms, such as stress and anxiety. · Some people find hypnosis, acupuncture, and massage helpful for ending the smoking habit. · Eat a healthy diet and get regular exercise. Having healthy habits will help your body move past its craving for nicotine. · Be prepared to keep trying. Most people are not successful the first few times they try to quit. Do not get mad at yourself if you smoke again. Make a list of things you learned and think about when you want to try again, such as next week, next month, or next year. Where can you learn more? Go to https://Seattle Coffee CompanypenoemyewBareedEE.Jump or Fall. org and sign in to your [employer] account. Enter T308 in the Vudu box to learn more about \"Stopping Smoking: Care Instructions. \"     If you do not have an account, please click on the \"Sign Up Now\" link. Current as of: March 12, 2020               Content Version: 12.5  © 2006-2020 [employer]. Care instructions adapted under license by [facility] ([facility]). If you have questions about a medical condition or this instruction, always ask your healthcare professional. [place] [address] any warranty or liability for your use of this information. Patient Education        Leg and Ankle Edema: Care Instructions  Your Care Instructions  Swelling in the legs, ankles, and feet is called edema. It is common after you sit or stand for a while. Long plane flights or car rides often cause swelling in the legs and feet. You may also have swelling if you have to stand for long periods of time at your job.  Problems with the veins in the legs (varicose veins) and changes in hormones can also cause swelling. Sometimes the swelling in the ankles and feet is caused by a more serious problem, such as heart failure, infection, blood clots, or liver or kidney disease. Follow-up care is a key part of your treatment and safety. Be sure to make and go to all appointments, and call your doctor if you are having problems. It's also a good idea to know your test results and keep a list of the medicines you take. How can you care for yourself at home? · If your doctor gave you medicine, take it as prescribed. Call your doctor if you think you are having a problem with your medicine. · Whenever you are resting, raise your legs up. Try to keep the swollen area higher than the level of your heart. · Take breaks from standing or sitting in one position. ? Walk around to increase the blood flow in your lower legs. ? Move your feet and ankles often while you stand, or tighten and relax your leg muscles. · Wear support stockings. Put them on in the morning, before swelling gets worse. · Eat a balanced diet. Lose weight if you need to. · Limit the amount of salt (sodium) in your diet. Salt holds fluid in the body and may increase swelling. When should you call for help? [id number] anytime you think you may need emergency care. For example, call if:  · You have symptoms of a blood clot in your lung (called a pulmonary embolism). These may include:  ? Sudden chest pain. ? Trouble breathing. ? Coughing up blood. Call your doctor now or seek immediate medical care if:  · You have signs of a blood clot, such as:  ? Pain in your calf, back of the knee, thigh, or groin. ? Redness and swelling in your leg or groin. · You have symptoms of infection, such as:  ? Increased pain, swelling, warmth, or redness. ? Red streaks or pus. ? A fever. Watch closely for changes in your health, and be sure to contact your doctor if:  · Your swelling is getting worse. · You have new or worsening pain in your legs.   · You do not get better as expected. Where can you learn more? Go to https://chpepiceweb.ProNoxis. org and sign in to your Scoop[employer]it account. Enter X334 in the [facility] box to learn more about \"Leg and Ankle Edema: Care Instructions. \"     If you do not have an account, please click on the \"Sign Up Now\" link. Current as of: June 26, 2019               Content Version: 12.5  © 2006-2020 [employer]. Care instructions adapted under license by [facility] ([facility]). If you have questions about a medical condition or this instruction, always ask your healthcare professional. [address] any warranty or liability for your use of this information. Patient Education        DASH Diet: Care Instructions  Your Care Instructions     The DASH diet is an eating plan that can help lower your blood pressure. DASH stands for Dietary Approaches to Stop Hypertension. Hypertension is high blood pressure. The DASH diet focuses on eating foods that are high in calcium, potassium, and magnesium. These nutrients can lower blood pressure. The foods that are highest in these nutrients are fruits, vegetables, low-fat dairy products, nuts, seeds, and legumes. But taking calcium, potassium, and magnesium supplements instead of eating foods that are high in those nutrients does not have the same effect. The DASH diet also includes whole grains, fish, and poultry. The DASH diet is one of several lifestyle changes your doctor may recommend to lower your high blood pressure. Your doctor may also want you to decrease the amount of sodium in your diet. Lowering sodium while following the DASH diet can lower blood pressure even further than just the DASH diet alone. Follow-up care is a key part of your treatment and safety. Be sure to make and go to all appointments, and call your doctor if you are having problems.  It's also a good idea to know your test results and keep a list of the medicines you take.  How can you care for yourself at home? Following the DASH diet  · Eat 4 to 5 servings of fruit each day. A serving is 1 medium-sized piece of fruit, ½ cup chopped or canned fruit, 1/4 cup dried fruit, or 4 ounces (½ cup) of fruit juice. Choose fruit more often than fruit juice. · Eat 4 to 5 servings of vegetables each day. A serving is 1 cup of lettuce or raw leafy vegetables, ½ cup of chopped or cooked vegetables, or 4 ounces (½ cup) of vegetable juice. Choose vegetables more often than vegetable juice. · Get 2 to 3 servings of low-fat and fat-free dairy each day. A serving is 8 ounces of milk, 1 cup of yogurt, or 1 ½ ounces of cheese. · Eat 6 to 8 servings of grains each day. A serving is 1 slice of bread, 1 ounce of dry cereal, or ½ cup of cooked rice, pasta, or cooked cereal. Try to choose whole-grain products as much as possible. · Limit lean meat, poultry, and fish to 2 servings each day. A serving is 3 ounces, about the size of a deck of cards. · Eat 4 to 5 servings of nuts, seeds, and legumes (cooked dried beans, lentils, and split peas) each week. A serving is 1/3 cup of nuts, 2 tablespoons of seeds, or ½ cup of cooked beans or peas. · Limit fats and oils to 2 to 3 servings each day. A serving is 1 teaspoon of vegetable oil or 2 tablespoons of salad dressing. · Limit sweets and added sugars to 5 servings or less a week. A serving is 1 tablespoon jelly or jam, ½ cup sorbet, or 1 cup of lemonade. · Eat less than 2,300 milligrams (mg) of sodium a day. If you limit your sodium to 1,500 mg a day, you can lower your blood pressure even more. Tips for success  · Start small. Do not try to make dramatic changes to your diet all at once. You might feel that you are missing out on your favorite foods and then be more likely to not follow the plan. Make small changes, and stick with them. Once those changes become habit, add a few more changes. · Try some of the following:  ?  Make it a goal to eat a fruit or vegetable at every meal and at snacks. This will make it easy to get the recommended amount of fruits and vegetables each day. ? Try yogurt topped with fruit and nuts for a snack or healthy dessert. ? Add lettuce, tomato, cucumber, and onion to sandwiches. ? Combine a ready-made pizza crust with low-fat mozzarella cheese and lots of vegetable toppings. Try using tomatoes, squash, spinach, broccoli, carrots, cauliflower, and onions. ? Have a variety of cut-up vegetables with a low-fat dip as an appetizer instead of chips and dip. ? Sprinkle sunflower seeds or chopped almonds over salads. Or try adding chopped walnuts or almonds to cooked vegetables. ? Try some vegetarian meals using beans and peas. Add garbanzo or kidney beans to salads. Make burritos and tacos with mashed garcia beans or black beans. Where can you learn more? Go to https://ComplyMDmajoCella Energy.Girltank. org and sign in to your [employer] account. Enter L899 in the [facility] box to learn more about \"DASH Diet: Care Instructions. \"     If you do not have an account, please click on the \"Sign Up Now\" link. Current as of: December 16, 2019               Content Version: 12.5  © 9621-4381 Healthwise, Incorporated. Care instructions adapted under license by [facility] ([facility]). If you have questions about a medical condition or this instruction, always ask your healthcare professional. [place] [address] any warranty or liability for your use of this information.

## 2020-09-28 NOTE — PROGRESS NOTES
CHIEF COMPLAINT  Chief Complaint   Patient presents with    Other     follow up left lower leg swelling         HPI   Tamara Palacio is a 63 y.o. female who presents to the office for follow-up regarding left leg swelling. Patient was seen in the emergency department approximately 2 weeks ago and then followed up the next day in the office. Modifications were discussed in detail with patient. Patient was then seen by orthopedics who recommended compression stockings. Patient reports that she has been doing well since wearing the compression stockings and watching her diet. Patient denies any new or worsening swelling. No significant pain. Patient denies any dizziness, lightheadedness, chest pain, tightness, palpitations or shortness of breath. No abdominal pain or discomfort. No nausea, vomiting, or diarrhea. No changes in bowel or bladder habits. Patient reports taking previously prescribed medications as recommended. No other complaints, modifying factors or associated symptoms. Nursing notes reviewed.    Past Medical History:   Diagnosis Date    Chest pain 11/10    negative myoview stress    Hematuria 10/2016    neg cysto, Dr Sudha Torres    Hypertension     PONV (postoperative nausea and vomiting)     Primary osteoarthritis involving multiple joints     hands, Dr Eaton Every Routine health maintenance      TAC 5/13    Sleep apnea     did not tolerate CPAP    Tarsal tunnel syndrome 2014     Past Surgical History:   Procedure Laterality Date    ARTHROPLASTY Right 10/21/2019    RIGHT THUMB CARPOMETACARPAL ARTHROPLASTY -BLOCK- -SLEEP APNEA- performed by Mai Schaffer MD at [address] [place] [address] [address]  2004    stomach stapling    FOOT TENDON SURGERY Left 12/9/14    plantar fasciotomy, tendon transfer, gastrocnemius recession/cortisone    HAND TENDON SURGERY Right 10/21/2019    FLEXOR CARPI RADIALIS TENDON INTERPOSITION performed by Mai Schaffer MD at [address] [place] [address] HYSTERECTOMY      KNEE ARTHROSCOPY      OTHER SURGICAL HISTORY  10/14/2016    cystoscopy, bilateral retrogrades, urethral dilation    URETHRAL STRICTURE DILATATION  7/14     Family History   Problem Relation Age of Onset    High Blood Pressure Mother     High Cholesterol Mother     Diabetes Mother     Stroke Mother     High Blood Pressure Father     High Cholesterol Father     Asthma Father     High Blood Pressure Brother     High Cholesterol Brother     Heart Disease Brother 62    Diabetes Brother      Social History     Socioeconomic History    Marital status:      Spouse name: Not on file    Number of children: Not on file    Years of education: Not on file    Highest education level: Not on file   Occupational History    Not on file   Social Needs    Financial resource strain: Not on file    Food insecurity     Worry: Not on file     Inability: Not on file    Transportation needs     Medical: Not on file     Non-medical: Not on file   Tobacco Use    Smoking status: Current Every Day Smoker     Packs/day: 1.00     Years: 42.00     Pack years: 42.00     Types: Cigarettes    Smokeless tobacco: Never Used   Substance and Sexual Activity    Alcohol use: No     Alcohol/week: 0.0 standard drinks    Drug use: No    Sexual activity: Not Currently   Lifestyle    Physical activity     Days per week: Not on file     Minutes per session: Not on file    Stress: Not on file   Relationships    Social connections     Talks on phone: Not on file     Gets together: Not on file     Attends Temple service: Not on file     Active member of club or organization: Not on file     Attends meetings of clubs or organizations: Not on file     Relationship status: Not on file    Intimate partner violence     Fear of current or ex partner: Not on file     Emotionally abused: Not on file     Physically abused: Not on file     Forced sexual activity: Not on file   Other Topics Concern    Not on file Social History Narrative    Not on file     Current Outpatient Medications   Medication Sig Dispense Refill    oxyCODONE-acetaminophen (PERCOCET) 7.5-325 MG per tablet       Cholecalciferol (VITAMIN D3) 125 MCG (5000 UT) TABS Take by mouth      Simethicone (GAS RELIEF 125 MAX ST PO) Take by mouth      lisinopril-hydroCHLOROthiazide (PRINZIDE;ZESTORETIC) 10-12.5 MG per tablet Take 1 tablet by mouth daily 30 tablet 5    dicyclomine (BENTYL) 10 MG capsule TAKE 1 OR 2 CAPSULES BY MOUTH 3 TIMES A DAY BEFORE MEALS AS NEEDED FOR DIARRHEA 540 capsule 0    fluticasone (FLONASE) 50 MCG/ACT nasal spray 2 sprays by Each Nostril route daily 3 Bottle 1    meloxicam (MOBIC) 15 MG tablet Take 15 mg by mouth daily      aspirin 81 MG tablet Take 81 mg by mouth daily      traMADol (ULTRAM) 50 MG tablet Take 50 mg by mouth every 8 hours as needed for Pain. Cadence Sabianism gabapentin (NEURONTIN) 300 MG capsule TAKE THREE CAPSULES BY MOUTH THREE TIMES A  capsule 1    oxyCODONE-acetaminophen (PERCOCET) 5-325 MG per tablet Take 1 tablet by mouth every 6 hours as needed for Pain for up to 7 days. 28 tablet 0     No current facility-administered medications for this visit. Allergies   Allergen Reactions    Codeine     Morphine And Related     Sulfa Antibiotics      Caused elevation of WBC    Itraconazole      Swelling of feet       REVIEW OF SYSTEMS  Review of Systems   Constitutional: Negative for activity change, appetite change, chills and fever. HENT: Negative for congestion, rhinorrhea and sore throat. Eyes: Negative for visual disturbance. Respiratory: Negative for cough, shortness of breath and wheezing. Cardiovascular: Positive for leg swelling. Negative for chest pain. Gastrointestinal: Negative for abdominal pain, diarrhea, nausea and vomiting. Genitourinary: Negative for dysuria, frequency, hematuria and urgency. Musculoskeletal: Negative for myalgias. Skin: Negative for rash.    Neurological: Negative for dizziness, weakness, light-headedness, numbness and headaches. Psychiatric/Behavioral: Negative for sleep disturbance. PHYSICAL EXAM  /70   Pulse 65   Temp 98.1 °F (36.7 °C) (Oral)   Wt 240 lb (108.9 kg)   SpO2 96%   BMI 41.20 kg/m²     Physical Exam  Vitals signs reviewed. Constitutional:       General: She is not in acute distress. Appearance: Normal appearance. She is well-developed. She is not diaphoretic. HENT:      Head: Normocephalic and atraumatic. Nose: Nose normal.      Mouth/Throat:      Mouth: Mucous membranes are moist.   Eyes:      General:         Right eye: No discharge. Left eye: No discharge. Pupils: Pupils are equal, round, and reactive to light. Neck:      Musculoskeletal: Normal range of motion and neck supple. Vascular: No JVD. Cardiovascular:      Rate and Rhythm: Normal rate and regular rhythm. Pulses: Normal pulses. Pulmonary:      Effort: Pulmonary effort is normal. No respiratory distress. Breath sounds: No stridor. No wheezing, rhonchi or rales. Chest:      Chest wall: No tenderness. Abdominal:      General: Bowel sounds are normal.      Palpations: Abdomen is soft. Musculoskeletal: Normal range of motion. General: No swelling or deformity. Left lower leg: Edema present. Skin:     General: Skin is warm and dry. Capillary Refill: Capillary refill takes less than 2 seconds. Coloration: Skin is not pale. Findings: No bruising, lesion or rash. Neurological:      General: No focal deficit present. Mental Status: She is alert and oriented to person, place, and time. Psychiatric:         Mood and Affect: Mood normal.         Behavior: Behavior normal.       ASSESSMENT/PLAN:   1. Leg edema, left  Patient is here today for follow-up regarding left leg swelling. Patient was seen approximately 10 days ago for evaluation of left lower leg swelling and tenderness.   Patient was

## 2020-11-03 NOTE — TELEPHONE ENCOUNTER
Patient called [place] [facility]) to schedule appointment with red flag complaint; transferred to [facility] for triage by Randolph Villegas. Pt calls to report symptoms of left leg pain. Pt states symptoms started 3-4 weeks ago. Rates the pain as severe. Reports the left ankle is swollen and red on the inside of the ankle. Denies fevers, breathing difficulty or chest pains. Second level triage referred to Sari Brown NP, she recommends patient to be seen by PCP today for further evaluation    Informed of disposition. Care advice as documented. Instructed to call back with worsening symptoms. Soft transfer to [facility]) to schedule appointment as recommended. Please do not respond to the triage nurse through this encounter. Any subsequent communication should be directly with the patient. Reason for Disposition   Thigh, calf, or ankle swelling in only one leg    Answer Assessment - Initial Assessment Questions  1. ONSET: \"When did the pain start? \"       3-4 weeks ago  2. LOCATION: \"Where is the pain located? \"       Left leg  3. PAIN: \"How bad is the pain? \"    (Scale 1-10; or mild, moderate, severe)    -  MILD (1-3): doesn't interfere with normal activities     -  MODERATE (4-7): interferes with normal activities (e.g., work or school) or awakens from sleep, limping     -  SEVERE (8-10): excruciating pain, unable to do any normal activities, unable to walk      severe  4. WORK OR EXERCISE: \"Has there been any recent work or exercise that involved this part of the body? \"       No  5. CAUSE: \"What do you think is causing the leg pain? \"      unknown  6. OTHER SYMPTOMS: \"Do you have any other symptoms? \" (e.g., chest pain, back pain, breathing difficulty, swelling, rash, fever, numbness, weakness)      Ankle swelling   7. PREGNANCY: \"Is there any chance you are pregnant? \" \"When was your last menstrual period? \"      No    Protocols used: LEG PAIN-ADULT-[place]
“Patient's name, , procedure and correct site were confirmed during the [place] Timeout.”

## 2020-12-17 ENCOUNTER — VIRTUAL VISIT (OUTPATIENT)
Dept: FAMILY MEDICINE CLINIC | Age: 60
End: 2020-12-17
Payer: MEDICARE

## 2020-12-17 PROCEDURE — 99214 OFFICE O/P EST MOD 30 MIN: CPT | Performed by: NURSE PRACTITIONER

## 2020-12-17 RX ORDER — POLYETHYLENE GLYCOL 3350 17 G/17G
POWDER, FOR SOLUTION ORAL
Qty: 238 G | Refills: 1 | Status: ON HOLD | OUTPATIENT
Start: 2020-12-17 | End: 2021-01-13 | Stop reason: ALTCHOICE

## 2020-12-17 RX ORDER — DICYCLOMINE HYDROCHLORIDE 10 MG/1
CAPSULE ORAL
Qty: 540 CAPSULE | Refills: 0 | Status: SHIPPED | OUTPATIENT
Start: 2020-12-17 | End: 2021-03-08

## 2020-12-17 RX ORDER — LISINOPRIL AND HYDROCHLOROTHIAZIDE 12.5; 1 MG/1; MG/1
1 TABLET ORAL DAILY
Qty: 90 TABLET | Refills: 1 | Status: SHIPPED | OUTPATIENT
Start: 2020-12-17 | End: 2021-03-22

## 2020-12-17 RX ORDER — BISACODYL 5 MG
TABLET, DELAYED RELEASE (ENTERIC COATED) ORAL
Qty: 4 TABLET | Refills: 0 | Status: ON HOLD | OUTPATIENT
Start: 2020-12-17 | End: 2021-01-13 | Stop reason: ALTCHOICE

## 2020-12-17 ASSESSMENT — ENCOUNTER SYMPTOMS
DIARRHEA: 1
RHINORRHEA: 0
SORE THROAT: 0
ABDOMINAL PAIN: 0
COUGH: 0
WHEEZING: 0
SHORTNESS OF BREATH: 0
VOMITING: 0
NAUSEA: 0

## 2020-12-17 NOTE — PROGRESS NOTES
2020    TELEHEALTH EVALUATION -- Audio/Visual (During UUKUQ-97 public health emergency)    HPI:    Jessie Guillory (:  1960) has requested an audio/video evaluation for the following concern(s): for 6mth check up. She reports doing well. No recent changes to medications or medical illness. Patient denies any dizziness, lightheadedness, headache or blurred vision. No unusual fatigue or unexplained weight loss. Patient denies any episodes of chest pain, tightness, palpitations or shortness of breath. No abdominal pain or discomfort. Patient reports chronic diarrhea related to IBS. Patient reports history of gastric bypass and reports that symptoms have been present since then. Patient reports taking medications with relief. Patient denies any dark tarry stools. No dysuria, hematuria, urinary urgency, frequency, tension. No nausea or vomiting. Patient sees pain management as well. Review of Systems   Constitutional: Negative for activity change, appetite change, chills and fever. HENT: Negative for congestion, rhinorrhea and sore throat. Eyes: Negative for visual disturbance. Respiratory: Negative for cough, shortness of breath and wheezing. Cardiovascular: Negative for chest pain and leg swelling. Gastrointestinal: Positive for diarrhea. Negative for abdominal pain, nausea and vomiting. Genitourinary: Negative for dysuria, frequency, hematuria and urgency. Frequent urination at night   Musculoskeletal: Positive for arthralgias. Negative for gait problem and myalgias. Skin: Negative for rash. Neurological: Negative for dizziness, weakness, light-headedness, numbness and headaches. Psychiatric/Behavioral: Negative for self-injury and sleep disturbance. The patient is not nervous/anxious. Prior to Visit Medications    Medication Sig Taking?  Authorizing Provider   lisinopril-hydroCHLOROthiazide (PRINZIDE;ZESTORETIC) 10-12.5 MG per tablet Take 1 tablet by mouth daily Yes Ignacio Rendon, APRN - CNP   dicyclomine (BENTYL) 10 MG capsule TAKE 1 OR 2 CAPSULES BY MOUTH 3 TIMES A DAY BEFORE MEALS AS NEEDED FOR DIARRHEA Yes Ignacio Rendon, RUBEN - MADINA   oxyCODONE-acetaminophen (PERCOCET) 7.5-325 MG per tablet  Yes Historical Provider, MD   Cholecalciferol (VITAMIN D3) 125 MCG (5000 UT) TABS Take by mouth Yes Historical Provider, MD   Simethicone (GAS RELIEF 125 MAX ST PO) Take by mouth Yes Historical Provider, MD   fluticasone (FLONASE) 50 MCG/ACT nasal spray 2 sprays by Each Nostril route daily Yes Irena Escobar MD   meloxicam (MOBIC) 15 MG tablet Take 15 mg by mouth daily Yes Historical Provider, MD   aspirin 81 MG tablet Take 81 mg by mouth daily Yes Historical Provider, MD   traMADol (ULTRAM) 50 MG tablet Take 50 mg by mouth every 8 hours as needed for Pain. . Yes April J Uriel   gabapentin (NEURONTIN) 300 MG capsule TAKE THREE CAPSULES BY MOUTH THREE TIMES A DAY Yes Irena Escobar MD   FIBER ADULT GUMMIES PO Take 10 g by mouth daily  Historical Provider, MD   oxyCODONE-acetaminophen (PERCOCET) 5-325 MG per tablet Take 1 tablet by mouth every 6 hours as needed for Pain for up to 7 days.   Chicho Barron MD       Social History     Tobacco Use    Smoking status: Current Every Day Smoker     Packs/day: 1.00     Years: 42.00     Pack years: 42.00     Types: Cigarettes    Smokeless tobacco: Never Used   Substance Use Topics    Alcohol use: No     Alcohol/week: 0.0 standard drinks    Drug use: No        Allergies   Allergen Reactions    Codeine     Morphine And Related     Sulfa Antibiotics      Caused elevation of WBC    Itraconazole      Swelling of feet   ,   Past Medical History:   Diagnosis Date    Chest pain 11/10    negative myoview stress    Hematuria 10/2016    neg cysto, Dr Carrol Lakhani    Hypertension     PONV (postoperative nausea and vomiting)     Primary osteoarthritis involving multiple joints     hands, Dr Gris Coffey    Routine health maintenance      TAC 5/13    Sleep apnea     did not tolerate CPAP    Tarsal tunnel syndrome 2014   ,   Past Surgical History:   Procedure Laterality Date    ARTHROPLASTY Right 10/21/2019    RIGHT THUMB CARPOMETACARPAL ARTHROPLASTY -BLOCK- -SLEEP APNEA- performed by [facility] at [facility]  2004    stomach stapling    FOOT TENDON SURGERY Left 12/9/14    plantar fasciotomy, tendon transfer, gastrocnemius recession/cortisone    HAND TENDON SURGERY Right 10/21/2019    FLEXOR CARPI RADIALIS TENDON INTERPOSITION performed by Martin Horne MD at [address] [place] [address] ARTHROSCOPY      OTHER SURGICAL HISTORY  10/14/2016    cystoscopy, bilateral retrogrades, urethral dilation    URETHRAL STRICTURE DILATATION  7/14   ,   Social History     Tobacco Use    Smoking status: Current Every Day Smoker     Packs/day: 1.00     Years: 42.00     Pack years: 42.00     Types: Cigarettes    Smokeless tobacco: Never Used   Substance Use Topics    Alcohol use: No     Alcohol/week: 0.0 standard drinks    Drug use: No   ,   Family History   Problem Relation Age of Onset    High Blood Pressure Mother     High Cholesterol Mother     Diabetes Mother     Stroke Mother     High Blood Pressure Father     High Cholesterol Father     Asthma Father     High Blood Pressure Brother     High Cholesterol Brother     Heart Disease Brother 62    Diabetes Brother    ,   Immunization History   Administered Date(s) Administered    Hepatitis A Adult (Havrix, Vaqta) 01/28/2019, 07/30/2019    Influenza Virus Vaccine 10/11/2010, 10/10/2012, 10/10/2014, 10/12/2015, 09/03/2018    Influenza Whole 11/11/2009    Influenza, Quadv, IM, (6 mo and older Fluzone, Flulaval, Fluarix and 3 yrs and older Afluria) 12/20/2016    Influenza, Quadv, IM, PF (6 mo and older Fluzone, Flulaval, Fluarix, and 3 yrs and older Afluria) 09/01/2020    Pneumococcal Polysaccharide ([id number]) 01/05/2016    Td, unspecified formulation 08/24/2004    Tdap (Boostrix, Adacel) 11/25/2014    Zoster Live (Zostavax) 03/02/2016   ,   Health Maintenance   Topic Date Due    Annual Wellness Visit (AWV)  05/29/2019    Potassium monitoring  11/06/2020    Creatinine monitoring  11/06/2020    Lipid screen  12/11/2020    Shingles Vaccine (2 of 3) 02/05/2021 (Originally 4/27/2016)    Low dose CT lung screening  06/30/2021    Breast cancer screen  08/19/2021    Colon cancer screen colonoscopy  05/13/2023    DTaP/Tdap/Td vaccine (2 - Td) 11/25/2024    Flu vaccine  Completed    Pneumococcal 0-64 years Vaccine  Completed    Hepatitis C screen  Addressed    HIV screen  Addressed    Hepatitis A vaccine  Aged Out    Hepatitis B vaccine  Aged Out    Hib vaccine  Aged Out    Meningococcal (ACWY) vaccine  Aged Out       PHYSICAL EXAMINATION:  [ INSTRUCTIONS:  \"[x]\" Indicates a positive item  \"[]\" Indicates a negative item  -- DELETE ALL ITEMS NOT EXAMINED]  Vital Signs: (As obtained by patient/caregiver or practitioner observation)    Due to this being a telehealth visit, evaluation of the following vital signs/organ systems are limited. Constitutional: [x] Appears well-developed and well-nourished [x] No apparent distress      [] Abnormal-   Mental status  [x] Alert and awake  [x] Oriented to person/place/time [x]Able to follow commands      Eyes:  EOM    [x]  Normal  [] Abnormal-  Sclera  [x]  Normal  [] Abnormal -         Discharge [x]  None visible  [] Abnormal -    HENT:   [x] Normocephalic, atraumatic.   [] Abnormal   [x] Mouth/Throat: Mucous membranes are moist.     External Ears [x] Normal  [] Abnormal-     Neck: [x] No visualized mass     Pulmonary/Chest: [x] Respiratory effort normal.  [x] No visualized signs of difficulty breathing or respiratory distress        [] Abnormal-      Musculoskeletal:   [x] Normal gait with no signs of ataxia         [x] Normal range of motion of neck        [] Abnormal-       Neurological:        [x] current treatment management per pain management. Follow-up in 6 months, sooner for new or worsening symptoms. 4. Benign essential hypertension  Stable. Patient reports taking medications as prescribed. Patient denies any episodes of dizziness, lightheadedness, chest pain, tightness, shortness of breath. Patient currently on lisinoprilhydrochlorothiazide 10-12.5 mg daily. Continue with current treatment management follow-up in 6 months, sooner for new or worsening symptoms. - lisinopril-hydroCHLOROthiazide (PRINZIDE;ZESTORETIC) 10-12.5 MG per tablet; Take 1 tablet by mouth daily  Dispense: 90 tablet; Refill: 1    5. Pure hypercholesterolemia  Stable. Last lipid panel 1 year ago reviewed. Patient encouraged to monitor dietary intake, limiting saturated fats and increasing fiber. Labs ordered and pending we will follow-up with results. - Lipid, Fasting; Future    6. Vitamin D deficiency  Uncontrolled. Previous vitamin D level is sufficient. Patient reports taking supplement daily. Labs ordered and pending we will follow-up with results. - VITAMIN D 25 HYDROXY; Future    7. Colon cancer screening  Patient due for colonoscopy. Patient reports last colonoscopy 2013. Per GI note polyps were found with recommendations to follow-up in 3 years. Patient reports that she was unaware of that. I did recommend patient scheduling appointment for evaluation and further work-up. Patient verbalized and acknowledges. Patient currently denies any abdominal pain, bloating, dark tarry stools. Patient reports frequent diarrhea in the morning related to her IBS but is unchanged. - Cici Cano MD, Gastroenterology, [facility]      No follow-ups on file. Jessie Guillory is a 63 y.o. female being evaluated by a Virtual Visit (video visit) encounter to address concerns as mentioned above. A caregiver was present when appropriate.  Due to this being a TeleHealth encounter (During WSXSZ-23 public health emergency), evaluation of the following organ systems was limited: Vitals/Constitutional/EENT/Resp/CV/GI//MS/Neuro/Skin/Heme-Lymph-Imm. Pursuant to the emergency declaration under the [address] [place] [address], [address] [place] [address] authority and the [employer] Act, this Virtual Visit was conducted with patient's (and/or legal guardian's) consent, to reduce the patient's risk of exposure to COVID-19 and provide necessary medical care. The patient (and/or legal guardian) has also been advised to contact this office for worsening conditions or problems, and seek emergency medical treatment and/or call 911 if deemed necessary. Patient identification was verified at the start of the visit: Yes    Total time spent on this encounter: Not billed by time    Services were provided through a video synchronous discussion virtually to substitute for in-person clinic visit. Patient and provider were located at their individual homes. --RUBEN Villeda CNP on 12/17/2020 at 8:54 AM    An electronic signature was used to authenticate this note.

## 2020-12-17 NOTE — PATIENT INSTRUCTIONS
Please read the healthy family handout that you were given and share it with your family. Please compare this printed medication list with your medications at home to be sure they are the same. If you have any medications that are different please contact us immediately at [phone]. Also review your allergies that we have listed, these may also include medications that you have not been able to tolerate, make sure everything listed is correct. If you have any allergies that are different please contact us immediately at [phone].

## 2020-12-18 ENCOUNTER — NURSE ONLY (OUTPATIENT)
Dept: FAMILY MEDICINE CLINIC | Age: 60
End: 2020-12-18
Payer: MEDICARE

## 2020-12-18 LAB
A/G RATIO: 2.1 (ref 1.1–2.2)
ALBUMIN SERPL-MCNC: 4.2 G/DL (ref 3.4–5)
ALP BLD-CCNC: 78 U/L (ref 40–129)
ALT SERPL-CCNC: 14 U/L (ref 10–40)
ANION GAP SERPL CALCULATED.3IONS-SCNC: 9 MMOL/L (ref 3–16)
AST SERPL-CCNC: 20 U/L (ref 15–37)
BASOPHILS ABSOLUTE: 0.1 K/UL (ref 0–0.2)
BASOPHILS RELATIVE PERCENT: 0.5 %
BILIRUB SERPL-MCNC: <0.2 MG/DL (ref 0–1)
BUN BLDV-MCNC: 14 MG/DL (ref 7–20)
CALCIUM SERPL-MCNC: 9.5 MG/DL (ref 8.3–10.6)
CHLORIDE BLD-SCNC: 106 MMOL/L (ref 99–110)
CHOLESTEROL, FASTING: 195 MG/DL (ref 0–199)
CO2: 25 MMOL/L (ref 21–32)
CREAT SERPL-MCNC: 0.6 MG/DL (ref 0.6–1.2)
EOSINOPHILS ABSOLUTE: 0.7 K/UL (ref 0–0.6)
EOSINOPHILS RELATIVE PERCENT: 6.7 %
GFR AFRICAN AMERICAN: >60
GFR NON-AFRICAN AMERICAN: >60
GLOBULIN: 2 G/DL
GLUCOSE BLD-MCNC: 88 MG/DL (ref 70–99)
HCT VFR BLD CALC: 42.1 % (ref 36–48)
HDLC SERPL-MCNC: 41 MG/DL (ref 40–60)
HEMOGLOBIN: 14 G/DL (ref 12–16)
LDL CHOLESTEROL CALCULATED: 119 MG/DL
LYMPHOCYTES ABSOLUTE: 3.1 K/UL (ref 1–5.1)
LYMPHOCYTES RELATIVE PERCENT: 31.8 %
MCH RBC QN AUTO: 31.4 PG (ref 26–34)
MCHC RBC AUTO-ENTMCNC: 33.3 G/DL (ref 31–36)
MCV RBC AUTO: 94.3 FL (ref 80–100)
MONOCYTES ABSOLUTE: 0.8 K/UL (ref 0–1.3)
MONOCYTES RELATIVE PERCENT: 8 %
NEUTROPHILS ABSOLUTE: 5.1 K/UL (ref 1.7–7.7)
NEUTROPHILS RELATIVE PERCENT: 53 %
PDW BLD-RTO: 14.9 % (ref 12.4–15.4)
PLATELET # BLD: 287 K/UL (ref 135–450)
PMV BLD AUTO: 10.3 FL (ref 5–10.5)
POTASSIUM SERPL-SCNC: 5.2 MMOL/L (ref 3.5–5.1)
RBC # BLD: 4.47 M/UL (ref 4–5.2)
SODIUM BLD-SCNC: 140 MMOL/L (ref 136–145)
TOTAL PROTEIN: 6.2 G/DL (ref 6.4–8.2)
TRIGLYCERIDE, FASTING: 175 MG/DL (ref 0–150)
VITAMIN D 25-HYDROXY: 35.2 NG/ML
VLDLC SERPL CALC-MCNC: 35 MG/DL
WBC # BLD: 9.7 K/UL (ref 4–11)

## 2020-12-18 PROCEDURE — 36415 COLL VENOUS BLD VENIPUNCTURE: CPT | Performed by: NURSE PRACTITIONER

## 2021-01-07 ENCOUNTER — HOSPITAL ENCOUNTER (OUTPATIENT)
Age: 61
Discharge: HOME | End: 2021-01-07
Payer: MEDICARE

## 2021-01-07 PROCEDURE — U0002 COVID-19 LAB TEST NON-CDC: HCPCS

## 2021-01-07 PROCEDURE — U0003 INFECTIOUS AGENT DETECTION BY NUCLEIC ACID (DNA OR RNA); SEVERE ACUTE RESPIRATORY SYNDROME CORONAVIRUS 2 (SARS-COV-2) (CORONAVIRUS DISEASE [COVID-19]), AMPLIFIED PROBE TECHNIQUE, MAKING USE OF HIGH THROUGHPUT TECHNOLOGIES AS DESCRIBED BY CMS-2020-01-R: HCPCS

## 2021-01-08 LAB — SARS-COV-2, PCR: NOT DETECTED

## 2021-01-12 NOTE — PROGRESS NOTES
PAT Call placed. Message left in regards to Endoscopy procedure on 1/13/2021 to arrive at 0730. Instructions reviewed - Must have a  for procedure and  NPO after am dose prep completed , pt to call physician office if any difficulty with prep for procedure, phone number provided.

## 2021-01-13 ENCOUNTER — HOSPITAL ENCOUNTER (OUTPATIENT)
Age: 61
Setting detail: OUTPATIENT SURGERY
Discharge: HOME | End: 2021-01-13
Attending: INTERNAL MEDICINE | Admitting: INTERNAL MEDICINE
Payer: MEDICARE

## 2021-01-13 VITALS
TEMPERATURE: 97.1 F | HEIGHT: 63 IN | SYSTOLIC BLOOD PRESSURE: 144 MMHG | DIASTOLIC BLOOD PRESSURE: 81 MMHG | OXYGEN SATURATION: 95 % | BODY MASS INDEX: 44.3 KG/M2 | WEIGHT: 250 LBS | RESPIRATION RATE: 16 BRPM | HEART RATE: 61 BPM

## 2021-01-13 DIAGNOSIS — Z12.11 SPECIAL SCREENING FOR MALIGNANT NEOPLASMS, COLON: ICD-10-CM

## 2021-01-13 PROCEDURE — 45380 COLONOSCOPY AND BIOPSY: CPT | Performed by: INTERNAL MEDICINE

## 2021-01-13 PROCEDURE — 2709999900 HC NON-CHARGEABLE SUPPLY: Performed by: INTERNAL MEDICINE

## 2021-01-13 PROCEDURE — 99153 MOD SED SAME PHYS/QHP EA: CPT | Performed by: INTERNAL MEDICINE

## 2021-01-13 PROCEDURE — 7100000011 HC PHASE II RECOVERY - ADDTL 15 MIN: Performed by: INTERNAL MEDICINE

## 2021-01-13 PROCEDURE — 99152 MOD SED SAME PHYS/QHP 5/>YRS: CPT | Performed by: INTERNAL MEDICINE

## 2021-01-13 PROCEDURE — 88305 TISSUE EXAM BY PATHOLOGIST: CPT

## 2021-01-13 PROCEDURE — 6360000002 HC RX W HCPCS: Performed by: INTERNAL MEDICINE

## 2021-01-13 PROCEDURE — 3609010300 HC COLONOSCOPY W/BIOPSY SINGLE/MULTIPLE: Performed by: INTERNAL MEDICINE

## 2021-01-13 PROCEDURE — 7100000010 HC PHASE II RECOVERY - FIRST 15 MIN: Performed by: INTERNAL MEDICINE

## 2021-01-13 RX ORDER — FENTANYL CITRATE 50 UG/ML
INJECTION, SOLUTION INTRAMUSCULAR; INTRAVENOUS PRN
Status: DISCONTINUED | OUTPATIENT
Start: 2021-01-13 | End: 2021-01-13 | Stop reason: ALTCHOICE

## 2021-01-13 RX ORDER — DIPHENHYDRAMINE HYDROCHLORIDE 50 MG/ML
INJECTION INTRAMUSCULAR; INTRAVENOUS PRN
Status: DISCONTINUED | OUTPATIENT
Start: 2021-01-13 | End: 2021-01-13 | Stop reason: ALTCHOICE

## 2021-01-13 RX ORDER — SODIUM CHLORIDE, SODIUM LACTATE, POTASSIUM CHLORIDE, CALCIUM CHLORIDE 600; 310; 30; 20 MG/100ML; MG/100ML; MG/100ML; MG/100ML
INJECTION, SOLUTION INTRAVENOUS CONTINUOUS
Status: DISCONTINUED | OUTPATIENT
Start: 2021-01-13 | End: 2021-01-13 | Stop reason: HOSPADM

## 2021-01-13 RX ORDER — MIDAZOLAM HYDROCHLORIDE 5 MG/ML
INJECTION INTRAMUSCULAR; INTRAVENOUS PRN
Status: DISCONTINUED | OUTPATIENT
Start: 2021-01-13 | End: 2021-01-13 | Stop reason: ALTCHOICE

## 2021-01-13 ASSESSMENT — PAIN SCALES - GENERAL: PAINLEVEL_OUTOF10: 0

## 2021-01-13 NOTE — OP NOTE
[address] ,  [address] [facility]  Phone: [phone] [facility] [address],  [place]isb[place] [address], [address]  Phone: [phone]   XWC:[phone]    Colonoscopy Procedure Note    Patient: Greg Mott  : 1960    Procedure: Colonoscopy with biopsy    Date:  2021     Endoscopist:  Xavier Ellis MD    Referring Physician:  RUBEN Clifton CNP    Preoperative Diagnosis:  Special screening for malignant neoplasms, colon [Z12.11]    Postoperative Diagnosis:  See impression    Anesthesia: Anesthesia: Moderate Sedation  Sedation: Versed 10 mg IV, fentanyl 100 mcg IV, Benedryl 50mg IV  Start Time: 8:53  Stop Time: 9:21  ASA Class: 3  Mallampati: III (soft palate, base of uvula visible)    Indications: This is a 63y.o. year old female who presents today with screening for colon cancer, chronic diarrhea of unknown duration.     Procedure Details Informed consent was obtained for the procedure, including sedation. Risks of perforation, hemorrhage, adverse drug reaction and aspiration were discussed. The patient was placed in the left lateral decubitus position. Based on the pre-procedure assessment, including review of the patient's medical history, medications, allergies, and review of systems, she had been deemed to be an appropriate candidate for sedation; she was therefore sedated with the medications listed below. The patient was monitored continuously with ECG tracing, pulse oximetry, blood pressure monitoring, and direct observations. rectal examination was performed. There were no external hemorrhoids, fissures or skin tags. The colonoscope was inserted into the rectum and advanced under direct vision to the ileocecal valve. The right colon was examined twice as this increases polyp detection especially if other right colon polyps, older age, male, or alvarez syndrome. When segments could not be distended with CO2 or air, it was filled/distended with water. The quality of the colonic preparation was good. A careful inspection was made as the colonoscope was withdrawn, including a retroflexed view of the rectum; findings and interventions are described below. Appropriate photodocumentation Was Obtained. Findings: -normal colonic mucosa throughout   -Unable to intubate the terminal ileum or enter the cecum due to patient intolerance, looping, and a redundant colon despite repositioning and external pressure  -Multiple random biopsies of normal appearing colonic mucosa were obtained from the right and left colon to assess the possibility of microscopic (lymphocytic or collagenous) colitis.     - internal hemorrhoids  - PREP: miralax  - Overall difficulty: marked in degree  - Abdominal pressure: no  - Change in position: no  - Anesthesia issues: no  - Endocoff/Amplieye use: no    Specimens: Was Obtained    Complications: None; patient tolerated the procedure well. Disposition:   PACU - hemodynamically stable. Estimated Blood loss:  none    Withdrawal Time:  NA minutes    Impression:   -See post-procedure diagnoses. Recommendations:  -Await pathology.  -Barium enema recommended since cecum could not be entered.  -For colon cancer screening in this average-risk patient, colonoscopy may be repeated in 10 years if barium enema is normal.  Next colonoscopy should be with MAC/diprivan. Antonella Craigt 1/13/21 9:31 AM EST    Low gas diet especially broccoli, cauliflower, brussel sprouts, cabbage, and beans. Keeping a diet log avoiding disaccharides 1 week at a time (lactose, fructose, sucrose), a low FODMAP diet, a trial of otc agents including simethicone/charcoal tablets and/or beano with meals. Offered hydrogen breath testing to rule out small bacterial overgrowth, lactose, or fructose intolerance. Trial of a probiotic like Align or Culturele 1 twice a day. If not worse with fructose try to increase soluble fiber in the diet with fruit, nuts, cereal, or supplemental fiber such as citrucele, fibersure, or benefiber which can help with diarrhea. A trial if daily miralax for IBS-C(irritable bowel with constipation)/IBS-A(irritable bowel with alternating bowel movement). Low FODMAP Diet for IBS   The low FODMAP diet is an approach to control symptoms associated with irritable bowel syndrome (IBS). Carbohydrates can be present in different forms in foods. Some are well digested and absorbed to produce energy. Others are resistant to digestion but are important for stool formation and normal bowel function. There is evidence that one group of poorly absorbable, highly gas-forming carbohydrates may increase IBS symptoms. These foods are collectively called FODMAPs (Fermentable Oligosaccharides, Disaccharides, Monosaccharides and Polyols). Several high-quality clinical studies have shown symptom improvement in some people with IBS due to reduction in FODMAP intake. FODMAPs are found in a wide range of foods. Examples include:  Fruits such as mangoes, apples, pears, avocados, blackberries, and plums   Dairy products like cow, sheep, and goat milk, as well as yogurt, ice cream, and soft cheeses   Honey   Vegetables and legumes such as asparagus, bell peppers, broccoli, [place] sprouts, cabbage, cauliflower, eggplant, onion, garlic, baked beans, kidney beans, and lentils   Sweeteners like sorbitol and maltitol (frequently used in gum and other candies)  Effects of FODMAPs on the Gut  In the small and large intestine, FODMAPs can cause an increase in fluid and gas that distends the bowel. This can cause the sensation of bloating and abdominal pain or discomfort. It can also affect how the muscles in the wall of the bowel contract leading to diarrhea in some people or constipation in others. The malabsorption of FODMAPs occurs to the same extent in healthy people and is a normal phenomenon. In people with IBS the bowel symptoms are induced more readily. The reasons for this may include:   The way the muscles of the bowel respond (motility) to the distension   A heightened sensitivity within the digestive tract in people with IBS, which lowers the threshold for feeling pain   The type or number of bacteria in the bowel  From Science to Application The application of the low FODMAP diet requires the expert guidance of a dietician trained in the area. A typical approach would involve restricting problematic FODMAPs for 6-8 weeks, or until good symptom control is achieved. After this, small amounts of FODMAP-containing foods are re-introduced through challenges as advised by the dietician. The aim of challenging is to gradually increase to levels well-tolerated by the individual, while widening the diet as much as possible. Other Factors if the Low FODMAP Diet is not Working  Consider all dietary triggers. Start with caffeine, alcohol, and fats. These can act as irritants to the gut (intake in moderation is often advised). Other factors might include intolerance to a food product, such as lactose intolerance or non-celiac gluten intolerance. Other Health Considerations  A wide number of health benefits have been attributed to some FODMAPs. Fructans, inulin, and GOS are well known prebiotics, stimulating the growth of beneficial bacteria in the gut. For this reason it is important to note that the \"Low FODMAP diet\" is not a \"No FODMAP diet\" and it is not a \"lifetime diet. \"  After following this diet for 6-8 weeks, a dietician will review progress and help advise which foods (and how much) can be gradually re-introduced into your diet. The dietician will also ensure that your diet is nutritionally adequate for you. Many people can return to their usual diet with just a few high FODMAP foods that need to be avoided. Table: High FODMAP Containing Foods ? Keep in mind that food products from different countries may yield different FODMAP results, due to food processing or nature of the ingredients.     Excess fructose        Fruits:, cherries, rogelio, pears, tinned fruit in natural fruit juice, watermelon, large quantities of fruit juice or dried fruit\  Vegetables:, artichokes, sugar snap peas  Sugars:, high fructose corn syrup    Lactose Milk & Yogurts:and low-fat milk and yogurts\b2Gzjaj Products:cheeses (e.g. ricotta, cottage, cream cheese); custard, ice-cream    Fructans (fructo-oligosaccharides) & Galacto-oligosaccharides    Grains:and rye products (e.g. rye bread, rye crackers); Wheat and wheat products (e.g. wheat bread, pasta, couscous, wheat bran)  Fruits:, persimmon, watermelon  Vegetables:, legumes (e.g. baked beans, lentils, red kidney beans); onion, garlic, garlic salts, etc.  Others:(often called fiber in nutritional supplements and products)    Polyols    Sorbitol Mannitol   Fruits:, apricots, pears, blackberries, nectarines, plums  Beverages:and pear juice Vegetables:, mushrooms, snow peas  Fruits:   Sweeteners:free gums, hard candies and chocolates containing sorbitol, mannitol, xylitol isomalt, maltitol        The Low FODMAP Diet  (FODMAP=Fermentable Oligo-Di-Monosaccharides and Polyols)  FODMAPs are carbohydrates (sugars) that are found in foods. Not all carbohydrates are considered FODMAPs    The FODMAPs in the diet are:    ; Fructose (fruits, honey, high fructose com syrup (HFCS), etc)  ; Lactose (dairy)  ; Fructans (wheat, onion, garlic, etc)(fructans are also known as inulin)  ; Galactans (beans, lentils, legumes such as soy, etc)  ; Polyols (sweeteners containing sorbitol, mannitol, xylitol, maltitol, stone fruits such as avocado. apricots, cherries, nectarines, peaches, plums, etc)  ; FÖDMAPs are osmotic (means they pull water into the intestinal tract), may not be digested or absorbed well and could be fermented upon by bacteria in the intestinal tract when eaten in excess. Symptoms of gas, bloating, cramping and/or diarrhea may occur in those who could be sensitive to the effects of FODMAPs. A low FODMAP diet may help reduce symptorns, which will limit foods high in fructose, lactose, fructans, galactans and polyols. The low FODMAP diet is often used in those with irritable bowel syndrome (IBS). The diet also has potential use in those with similar symptoms arising from other digestive disorders such as inflammatory bowel disease. This diet will also limit fiber as some high fiber foods have also high amounts of FODMAPs. (Fiber is a component of complex carbohydrates that the body cannot digest, found in plant based foods such as beans, fruits, vegetables, whole grains, etc)     Low FODMAP Food Choices  Food Group Foods to Eat Foods to Peter Kiewit [facility],  [facility], beef, chicken, canned tuna, eggs, egg whites. fish. lamb, pork, shellfish, turke , cold cuts foods made with high FODMAP fruit sauces or with HFCS   Dairy lactose free dairy, small amounts of: cream cheese, half and half, hard cheeses (cheddar, [place]. parmesan, swiss), mozzarella, sherbet buttermilk, chocolate, cottage cheese, ice cream, creamy/cheesy sauces, milk (from cow, sheep or goat), sweetened condensed rnilk, evaporated milk, soft cheeses (brie, ricotta), sour cream, whipped cream, o utt   Meat, NonDairy  Alternatives almond milk. rice milk, rice milk ice cream, nuts, nut butters, seeds coconut milk, coconut cream, beans, black eyed peas, hummus lentils, istachios, so roducts   Grains wheat free grains/wheat free flours (gluten free grains are wheat free): bagels, breads, hot/cold cereals (corn flakes, cheerios, cream of rice, grits, oats, etc), crackers, noodles, pastas, quinoa, pancakes, pretzels, rice, tapioca.  tortillas, waffles chicory root, inulin, grains with I-IFCS or made from wheat (terms {or wheat: einkorn, emmer, katnut, spelt), wheat flours (terms for wheat flour: bromated, durum, enriched, farina, nathalie, semolina, white flours , flour tortillas, rye Fruits bananas, berries, cantaloupe, grapes, grapefruit, honeydew. kiwi, [place], lemon, lime, mandarin, orange, passion fruit, pineapple. rhubarb, tangerine avocado, apples. applesauce, apricots, dates, canned fruit, cherries, dried fruits, figs, guava, lychee, rogelio, nectarines, pears, papaya, peaches, plums, prunes, ersimmon, watermelon   Vegetables bamboo shoots, bell peppers, bok Sangita, cucumbers, carrots, celery, corn, eggplant, lettuce, leafy greens pumpkin, potatoes, squash, yams, butternut, winter tomatoes zucchini artichokes, asparagus. beets, leeks, broccoli, brussel sprouts, cabbage, cauliflower, fennel, green beans, mushrooms, okra, snow peas, summer squash   Desserts any made with allowed foods any with HFCS or made with foods to limit   Beverages low FODMAP fruit/vegetable juices  (limit to 1/2 cup at a time), coffee, tea any with I-IFCS. high FODMAP fruit/vegetable juices, fortified rudy (cash gu)   Seasonings,  Condiments most spices and herbs, homemade broth, butter, chives. flaxseed, garlic flavored oil garlic powder, olives, margarine, mayonnaise, onion powder, olive oil, pepper, salt, sugar. maple syrup without HFCS, mustard, low FODMAP salad dressings, soy sauce, marinara sauce (small amounts) vinegar, balsamic vinegar HFCS, agave, chutneys, coconut, garlic, honey, jams, jellies, molasses, onions, pickle, relish, high FODMAP fruit/vegetable sauces, salad dressings made with high FODMAPs, artificial sweeteners: sorbitol, mannitol, isomalt, xylilol (cough drops, gums, rnints   2  Tips for a low FODMAP diet:  ; Follow the diet for 6 weeks. After this, add high FODMAP foods one at a time back into the diet in small amounts to identify foods that COUId be \"triggers\" to your symptoms. Limit foods that trigger your symptoms.  ; Read food labels.  Avoid foods made with high FODMAPs such as high FODMAP fruits, HFCS, honey, inulin, wheat, soy, etc. However, a food could be an overall low FODMAP food if a high FODMAP food listed as the last ingredient.  ; Buy gluten free grains as they are wheat free. However, you do not need to follow a 100% gluten free diet as the focus is on FODMAPs, not gluten. Look for gluten free grains made with low FODMAPs, such as potato, quinoa, rice or corn. Avoid gluten free grains made with high FODMAPs.  ; Limit serving sizes for low FODMAP fruits/vegetabies and high fiber/low  FODMAP foods such as quinoa to a 1/2 cup per leslye/ ( 1/2 cup=size of a tennis ball) if you have symptoms after eating these foods. The symptoms could be related to eating large amounts of low FODMAPs or fiber all at once. Low FODMAP Meals and Snack Ideas  1 gluten free waffle with walnuts, blueberries, maple syrup without HFCS  2. eggs scrambled with spinach, bell peppers and cheddar cheese  3. oatmeal topped with sliced banana, almonds and brown sugar  4. fruit smoothie blended with lactose free vanilla yogurt and strawberries  5. rice pasta with chicken, tomatoes, spinach topped with pesto sauce  6. chicken salad mixed with chicken, lettuce, bell peppers, cucumbers, tomatoes, balsamic vinegar salad dressing  7. turkey wrap with gluten free tortilla, sliced turkey, lettuce, tomato, slice of cheddar cheese slice.  mayonnaise, mustard  8. ham and swiss cheese sandwich on gluten free bread, with mayonnaise, mustard  9. quesadilla with corn or gluten free tortilla and cheddar cheese  10. beef and vegetable stew (made with homemade broth, beef, allowed vegetables)

## 2021-01-13 NOTE — PROGRESS NOTES
. Assessment unchanged from previous. Patient and spouse both verbalizes understanding of discharge instructions and written instructions also given to patient. Questions and concerns addressed at this time. Denies any needs at this time. IV d/c'd. Pt discharged via wheelchair to car in good condition.

## 2021-01-13 NOTE — H&P
CURRENT MEDICATIONS   (This list may include medications prescribed during this encounter as epic can not insert only the list prior to this encounter.)  No current facility-administered medications on file prior to encounter. Current Outpatient Medications on File Prior to Encounter   Medication Sig Dispense Refill    lisinopril-hydroCHLOROthiazide (PRINZIDE;ZESTORETIC) 10-12.5 MG per tablet Take 1 tablet by mouth daily 90 tablet 1    FIBER ADULT GUMMIES PO Take 10 g by mouth daily      dicyclomine (BENTYL) 10 MG capsule TAKE 1 OR 2 CAPSULES BY MOUTH 3 TIMES A DAY BEFORE MEALS AS NEEDED FOR DIARRHEA 540 capsule 0    oxyCODONE-acetaminophen (PERCOCET) 7.5-325 MG per tablet       Cholecalciferol (VITAMIN D3) 125 MCG (5000 UT) TABS Take by mouth      Simethicone (GAS RELIEF 125 MAX ST PO) Take by mouth      fluticasone (FLONASE) 50 MCG/ACT nasal spray 2 sprays by Each Nostril route daily (Patient taking differently: 2 sprays by Each Nostril route as needed ) 3 Bottle 1    meloxicam (MOBIC) 15 MG tablet Take 15 mg by mouth daily      aspirin 81 MG tablet Take 81 mg by mouth daily      traMADol (ULTRAM) 50 MG tablet Take 50 mg by mouth every 8 hours as needed for Pain. Dimitri Jordan gabapentin (NEURONTIN) 300 MG capsule TAKE THREE CAPSULES BY MOUTH THREE TIMES A  capsule 1     ALLERGIES     Allergies   Allergen Reactions    Codeine     Morphine And Related     Sulfa Antibiotics      Caused elevation of WBC    Itraconazole      Swelling of feet     IMMUNIZATIONS     Immunization History   Administered Date(s) Administered    Hepatitis A Adult (Havrix, Vaqta) 01/28/2019, 07/30/2019    Influenza Virus Vaccine 10/11/2010, 10/10/2012, 10/10/2014, 10/12/2015, 09/03/2018    Influenza Whole 11/11/2009    Influenza, Quadv, IM, (6 mo and older Fluzone, Flulaval, Fluarix and 3 yrs and older Afluria) 12/20/2016  Influenza, Quadv, IM, PF (6 mo and older Fluzone, Flulaval, Fluarix, and 3 yrs and older Afluria) 09/01/2020    Pneumococcal Polysaccharide (Oeesycqfw25) 01/05/2016    Td, unspecified formulation 08/24/2004    Tdap (Boostrix, Adacel) 11/25/2014    Zoster Live (Zostavax) 03/02/2016     REVIEW OF SYSTEMS   See HPI for further details and pertinent postiives. Negative for the following:  Constitutional: Negative for weight change. Negative for appetite change and fatigue. HENT: Negative for nosebleeds, sore throat, mouth sores, and voice change. Respiratory: Negative for cough, choking and chest tightness. Cardiovascular: Negative for chest pain   Gastrointestinal: See HPI  Musculoskeletal: Negative for arthralgias. Skin: Negative for pallor. Neurological: Negative for weakness and light-headedness. Hematological: Negative for adenopathy. Does not bruise/bleed easily. Psychiatric/Behavioral: Negative for suicidal ideas. PHYSICAL EXAM   VITAL SIGNS:  Vitals:    01/13/21 0740   BP: (!) 141/82   Pulse: 63   Resp: 18   Temp: 96.6 °F (35.9 °C)   SpO2: 98%     Wt Readings from Last 3 Encounters:   01/13/21 250 lb (113.4 kg)   09/28/20 240 lb (108.9 kg)   09/21/20 230 lb (104.3 kg)     Constitutional: Well developed, Well nourished, No acute distress, Non-toxic appearance. HENT: Normocephalic, Atraumatic, Bilateral external ears normal, Oropharynx moist, No oral exudates, Nose normal.   Eyes: Conjunctiva normal, No discharge. Neck: Normal range of motion, No tenderness, Supple, No stridor. Lymphatic: No lymphadenopathy noted. Cardiovascular: Normal heart rate, Normal rhythm, No murmurs, No rubs, No gallops. Thorax & Lungs: Normal breath sounds, No respiratory distress, No wheezing, No chest tenderness. Abdomen: scars consistent with stated surgeries,  Soft NTND    Rectal:  Deferred. Skin: Warm, Dry, No erythema, No rash. Back: No tenderness, No CVA tenderness. Extremities: Intact distal pulses, No edema, No tenderness, No cyanosis, No clubbing. Neurologic: Alert & oriented x 3, Normal motor function, Normal sensory function, No focal deficits noted. RADIOLOGY/PROCEDURES     Reviewed per [address] reviewed per epic    FOLLOWUP     Screening Colonoscopy    The patient was counseled at length about the risks of adeola Covid-19 during their perioperative period and any recovery window from their procedure. The patient was made aware that adeola Covid-19  may worsen their prognosis for recovering from their procedure  and lend to a higher morbidity and/or mortality risk. All material risks, benefits, and reasonable alternatives including postponing the procedure were discussed. The patient does wish to proceed with the procedure at this time. Preprocedural COVID-19 throat swab was negative.           Chris NEGRETE 1/13/21 8:37 AM EST    CC:  RUBEN Bearden - CNP

## 2021-01-14 NOTE — RESULT ENCOUNTER NOTE
Please call patient with small and benign path results (lymph tissue, inflammatory, or hyperplastic). Therefore without any personal history of precancerous polyps (adenomas) or family history of precancerous polyps or cancer, they do not need another screening colonoscopy for 10 years. Had provided diet. Recommend OV if she has continued bowel problems.

## 2021-03-08 DIAGNOSIS — K58.0 IRRITABLE BOWEL SYNDROME WITH DIARRHEA: ICD-10-CM

## 2021-03-08 RX ORDER — DICYCLOMINE HYDROCHLORIDE 10 MG/1
CAPSULE ORAL
Qty: 540 CAPSULE | Refills: 0 | Status: SHIPPED | OUTPATIENT
Start: 2021-03-08 | End: 2022-05-13 | Stop reason: ALTCHOICE

## 2021-03-08 NOTE — TELEPHONE ENCOUNTER
Future appt scheduled 06/17/2021          Last appt 12/17/2020      Last Written     dicyclomine (BENTYL) 10 MG capsule  12/17/2020  #540  0 RF

## 2021-03-11 ENCOUNTER — HOSPITAL ENCOUNTER (OUTPATIENT)
Dept: GENERAL RADIOLOGY | Age: 61
Discharge: HOME | End: 2021-03-11
Payer: MEDICARE

## 2021-03-11 DIAGNOSIS — Z12.11 SPECIAL SCREENING FOR MALIGNANT NEOPLASMS, COLON: ICD-10-CM

## 2021-03-11 PROCEDURE — 74270 X-RAY XM COLON 1CNTRST STD: CPT

## 2021-03-12 ENCOUNTER — TELEPHONE (OUTPATIENT)
Dept: FAMILY MEDICINE CLINIC | Age: 61
End: 2021-03-12

## 2021-03-12 NOTE — LETTER
[address] [address]  Phone: [phone]  Fax: [phone]          March 15, 2021     Patient: Bhavani Kelly   YOB: 1960   Date of Visit: 3/12/2021       To Whom It May Concern: It is my medical opinion that Bina You requires a disability parking placard for the following reasons:  She cannot walk 200 feet without stopping to rest.  Duration of need: permanent    If you have any questions or concerns, please don't hesitate to call.     Sincerely,        RUBEN Warren - CNP

## 2021-03-22 DIAGNOSIS — I10 BENIGN ESSENTIAL HYPERTENSION: ICD-10-CM

## 2021-03-22 RX ORDER — LISINOPRIL AND HYDROCHLOROTHIAZIDE 12.5; 1 MG/1; MG/1
TABLET ORAL
Qty: 90 TABLET | Refills: 1 | Status: SHIPPED | OUTPATIENT
Start: 2021-03-22 | End: 2021-11-12 | Stop reason: SDUPTHER

## 2021-03-22 NOTE — TELEPHONE ENCOUNTER
Refilled medication per verbal order from provider.   Future appt scheduled 06/17/2021  Last appt 12/17/2020

## 2021-03-29 ENCOUNTER — OFFICE VISIT (OUTPATIENT)
Dept: FAMILY MEDICINE CLINIC | Age: 61
End: 2021-03-29
Payer: MEDICARE

## 2021-03-29 VITALS
BODY MASS INDEX: 42.71 KG/M2 | TEMPERATURE: 98.1 F | OXYGEN SATURATION: 96 % | WEIGHT: 241.13 LBS | SYSTOLIC BLOOD PRESSURE: 99 MMHG | HEART RATE: 77 BPM | DIASTOLIC BLOOD PRESSURE: 66 MMHG

## 2021-03-29 DIAGNOSIS — Z87.891 PERSONAL HISTORY OF TOBACCO USE: ICD-10-CM

## 2021-03-29 DIAGNOSIS — Z71.6 ENCOUNTER FOR SMOKING CESSATION COUNSELING: Primary | ICD-10-CM

## 2021-03-29 PROCEDURE — 99213 OFFICE O/P EST LOW 20 MIN: CPT | Performed by: NURSE PRACTITIONER

## 2021-03-29 RX ORDER — VARENICLINE TARTRATE
KIT
Qty: 1 BOX | Refills: 0 | Status: SHIPPED | OUTPATIENT
Start: 2021-03-29 | End: 2021-11-12

## 2021-03-29 ASSESSMENT — PATIENT HEALTH QUESTIONNAIRE - PHQ9
SUM OF ALL RESPONSES TO PHQ QUESTIONS 1-9: 0
SUM OF ALL RESPONSES TO PHQ QUESTIONS 1-9: 0

## 2021-03-29 ASSESSMENT — ENCOUNTER SYMPTOMS
WHEEZING: 0
DIARRHEA: 0
NAUSEA: 0
RHINORRHEA: 0
COUGH: 0
ABDOMINAL PAIN: 0
VOMITING: 0
SORE THROAT: 0
SHORTNESS OF BREATH: 0

## 2021-03-29 NOTE — PROGRESS NOTES
CHIEF COMPLAINT  Chief Complaint   Patient presents with    Other     discuss stop smoking        HPI   Juan Nichols is a 63 y.o. female who presents to the office to discuss smoking cessation. Patient reports \" I need to get my knees fixed and was told I have to quit smoking,\" patient reports smoking at least 1 pack/day over the past 40 years. Patient declines trying smoking cessation techniques in the past.  Patient reports that she would like to quit smoking within the next week. No other complaints, modifying factors or associated symptoms. Nursing notes reviewed.    Past Medical History:   Diagnosis Date    Chest pain 11/10    negative myoview stress    Hematuria 10/2016    neg cysto, Dr Jack Horse    Hypertension     PONV (postoperative nausea and vomiting)     Primary osteoarthritis involving multiple joints     hands, Dr Juan Carlos Bang Routine health maintenance      TAC 5/13    Sleep apnea     did not tolerate CPAP    Tarsal tunnel syndrome 2014     Past Surgical History:   Procedure Laterality Date    ARTHROPLASTY Right 10/21/2019    RIGHT THUMB CARPOMETACARPAL ARTHROPLASTY -BLOCK- -SLEEP APNEA- performed by Yolanda Mcbride MD at [facility] [address] SURGERY  2004    stomach stapling    COLONOSCOPY  2011    polyps    COLONOSCOPY  01/13/2021    COLONOSCOPY N/A 1/13/2021    COLONOSCOPY WITH BIOPSY performed by Viridiana Howell MD at [facility] Left 12/9/14    plantar fasciotomy, tendon transfer, gastrocnemius recession/cortisone    HAND TENDON SURGERY Right 10/21/2019    FLEXOR CARPI RADIALIS TENDON INTERPOSITION performed by Yolanda Mcbride MD at [facility] ARTHROSCOPY      OTHER SURGICAL HISTORY  10/14/2016    cystoscopy, bilateral retrogrades, urethral dilation    URETHRAL STRICTURE DILATATION  7/14     Family History   Problem Relation Age of Onset    High Blood Pressure Mother     High Cholesterol Mother TAKE 1 TABLET ONCE DAILY 90 tablet 1    dicyclomine (BENTYL) 10 MG capsule TAKE 1 OR 2 CAPSULES BY MOUTH THREE TIMES A DAY BEFORE MEALS AS NEEDED FOR DIARRHEA 540 capsule 0    oxyCODONE-acetaminophen (PERCOCET) 7.5-325 MG per tablet       Cholecalciferol (VITAMIN D3) 125 MCG (5000 UT) TABS Take by mouth      Simethicone (GAS RELIEF 125 MAX ST PO) Take by mouth      meloxicam (MOBIC) 15 MG tablet Take 15 mg by mouth daily      aspirin 81 MG tablet Take 81 mg by mouth daily      traMADol (ULTRAM) 50 MG tablet Take 50 mg by mouth every 8 hours as needed for Pain. Grace Clark gabapentin (NEURONTIN) 300 MG capsule TAKE THREE CAPSULES BY MOUTH THREE TIMES A  capsule 1     No current facility-administered medications for this visit. Allergies   Allergen Reactions    Codeine     Morphine And Related     Sulfa Antibiotics      Caused elevation of WBC    Itraconazole      Swelling of feet       REVIEW OF SYSTEMS  Review of Systems   Constitutional: Negative for activity change, appetite change, chills and fever. HENT: Negative for congestion, rhinorrhea and sore throat. Eyes: Negative for visual disturbance. Respiratory: Negative for cough, shortness of breath and wheezing. Cardiovascular: Negative for chest pain. Gastrointestinal: Negative for abdominal pain, diarrhea, nausea and vomiting. Genitourinary: Negative for dysuria, frequency, hematuria and urgency. Musculoskeletal: Positive for arthralgias. Negative for gait problem and myalgias. Skin: Negative for rash. Neurological: Negative for dizziness, weakness, light-headedness, numbness and headaches. Psychiatric/Behavioral: Negative for sleep disturbance. PHYSICAL EXAM  BP 99/66   Pulse 77   Temp 98.1 °F (36.7 °C) (Oral)   Wt 241 lb 2 oz (109.4 kg)   SpO2 96%   BMI 42.71 kg/m²   Physical Exam  Vitals signs reviewed. Constitutional:       General: She is not in acute distress. Appearance: Normal appearance.  She is well-developed. She is not diaphoretic. HENT:      Head: Normocephalic and atraumatic. Nose: Nose normal.      Mouth/Throat:      Mouth: Mucous membranes are moist.   Eyes:      General:         Right eye: No discharge. Left eye: No discharge. Pupils: Pupils are equal, round, and reactive to light. Neck:      Musculoskeletal: Normal range of motion and neck supple. Vascular: No JVD. Cardiovascular:      Rate and Rhythm: Normal rate and regular rhythm. Pulses: Normal pulses. Pulmonary:      Effort: Pulmonary effort is normal. No respiratory distress. Breath sounds: No stridor. No wheezing, rhonchi or rales. Chest:      Chest wall: No tenderness. Abdominal:      General: Bowel sounds are normal.      Palpations: Abdomen is soft. Musculoskeletal: Normal range of motion. General: No swelling or deformity. Left lower leg: Edema present. Skin:     General: Skin is warm and dry. Capillary Refill: Capillary refill takes less than 2 seconds. Coloration: Skin is not pale. Findings: No bruising, lesion or rash. Neurological:      General: No focal deficit present. Mental Status: She is alert and oriented to person, place, and time. Psychiatric:         Mood and Affect: Mood normal.         Behavior: Behavior normal.        ASSESSMENT/PLAN:   1. Encounter for smoking cessation counseling  Stable. Patient presents today to discuss smoking cessation. Patient reports that she smokes 1 pack/day over the past 40 years. Patient reports that she has ongoing knee pain and went to follow-up with orthopedics and was instructed on tobacco cessation prior to having surgery. Patient denies any previous smoking cessation products. Patient reports that she cannot use nicotine patches or the gum because it continues to have nicotine in it. Patient would like to start Chantix. We did discuss other options such as Wellbutrin/Zyban.   Patient concerned that she may have anxiety related to smoking cessation. I did recommend Wellbutrin however patient reports that she thinks she took it in the past for something but is unable to recall what for. We did discuss setting target quit date and beginning therapy 1 week prior to the target quit date. Patient aware that she needs to take the medication with a full glass of water and after she eats. Patient aware to follow directions on medications and take as prescribed. Patient aware that she can continue treatment for up to 12 weeks however if adverse side effects or concerns to follow-up or call the office immediately. Patient verbalized and acknowledges with plan of care at this time. - varenicline (CHANTIX STARTING MONTH EZEKIEL) 0.5 MG X 11 & 1 MG X 42 tablet; Take by mouth. Dispense: 1 box; Refill: 0         The note was completed using Dragon voice recognition transcription. Every effort was made to ensure accuracy; however, inadvertent  transcription errors may be present despite my best efforts to edit errors.     Johnson Hooper, APRN - CNP

## 2021-03-29 NOTE — PATIENT INSTRUCTIONS
Please read the healthy family handout that you were given and share it with your family. Please compare this printed medication list with your medications at home to be sure they are the same. If you have any medications that are different please contact us immediately at [phone]. Also review your allergies that we have listed, these may also include medications that you have not been able to tolerate, make sure everything listed is correct. If you have any allergies that are different please contact us immediately at [phone].

## 2021-03-30 ENCOUNTER — TELEPHONE (OUTPATIENT)
Dept: ADMINISTRATIVE | Age: 61
End: 2021-03-30

## 2021-03-30 NOTE — TELEPHONE ENCOUNTER
Submitted PA for CHANTIX  [facility] Key: [id number] STATUS: \": Approved, Coverage Starts on: 12/29/2020 12:00:00 AM, Coverage Ends on: 6/28/2021\"    If this requires a response please respond to the pool ( [id number] [facility]). Not the person sending the telephone encounter. Thank you please advise patient.

## 2021-04-26 DIAGNOSIS — Z71.6 ENCOUNTER FOR SMOKING CESSATION COUNSELING: ICD-10-CM

## 2021-04-27 RX ORDER — VARENICLINE TARTRATE 1 MG/1
1 TABLET, FILM COATED ORAL 2 TIMES DAILY
Qty: 60 TABLET | Refills: 5 | Status: SHIPPED | OUTPATIENT
Start: 2021-04-27 | End: 2021-11-12

## 2021-04-27 RX ORDER — VARENICLINE TARTRATE 1 MG/1
1 TABLET, FILM COATED ORAL 2 TIMES DAILY
Qty: 60 TABLET | Refills: 3 | OUTPATIENT
Start: 2021-04-27

## 2021-05-10 NOTE — PROGRESS NOTES
julia        Preop history and physical    Patient:  Ismael Wesley   YOB: 1960       Subjective:  Patient presents for evaluation of  her preop examination. She is scheduled for right total knee replacement on 5/26/2021 with . Patient reports postop nausea with anesthesia but denies any easy bleeding in the past.  Patient currently on aspirin 81 mg but denies any use of anticoagulation therapy, fish oil, vitamin E She denies any recent illnesses or infections. Patient reports that she did recently stop tramadol, pain management aware. Patient reports smoking 1 pack/day of cigarettes for approximately 40 years but quit on 4/2/2021. Patient denies any episodes of dizziness, lightheadedness, chest pain or shortness of breath. No abdominal pain or discomfort. No nausea, vomiting, dark tarry stools.       Patient Active Problem List    Diagnosis Date Noted    Primary osteoarthritis involving multiple joints      Priority: High     hands, knees, ankles, feet - Rx -pain management      Migraine with aura and without status migrainosus, not intractable 05/17/2016     Priority: Medium    Morbid obesity due to excess calories ([facility] [address] 01/04/2016     Priority: Medium     Bariatric surgery 2004  Bupropion ineffective      Pulmonary nodule 01/25/2016     Priority: Low     4 mm 1/25/2016 - stable 4/19/19      Pure hypercholesterolemia 01/07/2016     Priority: Low     Normal carotid, aorta, and peripheral doppler u/s 12/18 1/30/2019-The 10-year ASCVD risk score (Susan Hurt, et al., 2013) is: 10.1%                   Allergic sinusitis      Priority: Low    Diarrhea     Arthritis of carpometacarpal (CMC) joint of right thumb 09/26/2019    Irritable bowel syndrome with diarrhea 06/11/2019    Benign essential hypertension 12/04/2018    Smoker 12/04/2018    Vitamin D deficiency 12/20/2016     Last level: 12/4/2018       Calculus of gallbladder without cholecystitis without obstruction 09/13/2016 8/16      Chronic foot pain 01/05/2016     Dr Willam Martinez, podiatrist,      SPENCER (obstructive sleep apnea)      Could not tolerate CPAP 2015         Past Medical History:   Diagnosis Date    Chest pain 11/10    negative myoview stress    Hematuria 10/2016    neg cysto, Dr Nancy Panda    Hypertension     PONV (postoperative nausea and vomiting)     Primary osteoarthritis involving multiple joints     hands, Dr Leilani Butler Routine health maintenance      TAC 5/13    Sleep apnea     did not tolerate CPAP    Tarsal tunnel syndrome 2014        Past Surgical History:   Procedure Laterality Date    ARTHROPLASTY Right 10/21/2019    RIGHT THUMB CARPOMETACARPAL ARTHROPLASTY -BLOCK- -SLEEP APNEA- performed by Rafa Bonilla MD at [address] [place] [address] [address]  2004    stomach stapling    COLONOSCOPY  2011    polyps    COLONOSCOPY  01/13/2021    COLONOSCOPY N/A 1/13/2021    COLONOSCOPY WITH BIOPSY performed by Yossi Poole MD at [facility] Left 12/9/14    plantar fasciotomy, tendon transfer, gastrocnemius recession/cortisone    HAND TENDON SURGERY Right 10/21/2019    FLEXOR CARPI RADIALIS TENDON INTERPOSITION performed by Rafa Bonilla MD at [address] [place] [address] Dr DANIEL  10/14/2016    cystoscopy, bilateral retrogrades, urethral dilation    URETHRAL STRICTURE DILATATION  7/14        Current Outpatient Medications on File Prior to Visit   Medication Sig Dispense Refill    varenicline (CHANTIX CONTINUING MONTH PAK) 1 MG tablet Take 1 tablet by mouth 2 times daily 60 tablet 5    varenicline (CHANTIX STARTING MONTH EZEKIEL) 0.5 MG X 11 & 1 MG X 42 tablet Take by mouth.  1 box 0    lisinopril-hydroCHLOROthiazide (PRINZIDE;ZESTORETIC) 10-12.5 MG per tablet TAKE 1 TABLET ONCE DAILY 90 tablet 1    dicyclomine (BENTYL) 10 MG capsule TAKE 1 OR 2 CAPSULES BY MOUTH THREE TIMES A DAY BEFORE MEALS AS NEEDED FOR DIARRHEA 540 capsule 0    oxyCODONE-acetaminophen (PERCOCET) 7.5-325 MG per tablet       Cholecalciferol (VITAMIN D3) 125 MCG (5000 UT) TABS Take by mouth      Simethicone (GAS RELIEF 125 MAX ST PO) Take by mouth      meloxicam (MOBIC) 15 MG tablet Take 15 mg by mouth daily      aspirin 81 MG tablet Take 81 mg by mouth daily      gabapentin (NEURONTIN) 300 MG capsule TAKE THREE CAPSULES BY MOUTH THREE TIMES A  capsule 1     No current facility-administered medications on file prior to visit.          Allergies   Allergen Reactions    Codeine     Morphine And Related     Sulfa Antibiotics      Caused elevation of WBC    Itraconazole      Swelling of feet        Family History   Problem Relation Age of Onset    High Blood Pressure Mother     High Cholesterol Mother     Diabetes Mother     Stroke Mother     High Blood Pressure Father     High Cholesterol Father     Asthma Father     High Blood Pressure Brother     High Cholesterol Brother     Heart Disease Brother 62    Diabetes Brother         Social History     Tobacco Use    Smoking status: Former Smoker     Packs/day: 1.00     Years: 42.00     Pack years: 42.00     Types: Cigarettes    Smokeless tobacco: Former User     Quit date: 3/2/2021   Substance Use Topics    Alcohol use: No     Alcohol/week: 0.0 standard drinks         Immunization History   Administered Date(s) Administered    COVID-19, Pfizer, PF, 30mcg/0.3mL 01/23/2021, 02/13/2021    Hepatitis A Adult (Havrix, Vaqta) 01/28/2019, 07/30/2019    Influenza Virus Vaccine 10/11/2010, 10/10/2012, 10/10/2014, 10/12/2015, 09/03/2018    Influenza Whole 11/11/2009    Influenza, Quadv, IM, (6 mo and older Fluzone, Flulaval, Fluarix and 3 yrs and older Afluria) 12/20/2016    Influenza, Quadv, IM, PF (6 mo and older Fluzone, Flulaval, Fluarix, and 3 yrs and older Afluria) 09/01/2020    Pneumococcal Polysaccharide ([id number]) 01/05/2016    Td, unspecified formulation 08/24/2004    Tdap (Boostrix, Adacel) 11/25/2014    Zoster Live (Zostavax) 03/02/2016       Review of systems:  Constitutional:     Unexplained weight loss - no     Fever - no  Skin:     Rash - no     Itching - no  ENT:     Head congestion - no     Hearing loss - no  Eye:     Blurred vision - no     Eye pain - no  Cardiac:     Chest pain or discomfort - no     Orthopnea or PND - no  Respiratory     Cough - no     Wheeze - no     Dyspnea on exertion - no  Gastrointestinal     Frequent heartburn - no     Blood in stool - no  Urologic     Dysuria - no     Hematuria - no  Neurologic     Dizziness - no     Syncope - no  Psychiatric     Suicidal ideation - no     Anxiety - no    Objective:  /72   Pulse 65   Temp 98.3 °F (36.8 °C) (Oral)   Wt 253 lb (114.8 kg)   SpO2 98%   BMI 44.82 kg/m²   Patient is alert, oriented, and in no acute distress.   Eyes:  Conjunctivae and lids are clear             Pupils are equal, round, and reactive, irises nondeformed  Ears:  External ears and nose unremarkable, canals are clear, TMs clear   Mouth:  Lips, gums, tongue, oral mucosa unremarkable  Throat: Palates unremarkable               Pharynx clear  Nose: clear  Neck:  No masses, trachea midline, phonation normal, thyroid unremarkable              No significant cervical or supraclavicular adenopathy  Chest:  No deformity of thorax              Respirations easy and unlabored with equal breath sounds              Lungs clear  Heart: Sinus rhythm rate 61  Abdomen:  Soft  with no masses noted                     Hernia - none                    Liver and spleen not palpably enlarged                    Bowel sounds are normally active                    Tenderness - none                    Rebound or rididity - none  Neurologic:  Cranial nerves 2-12 are intact                      No ataxia                     No focal motor deficits found                        Reflexes in upper extremities are intact and symmetrical                      Reflexes in lower extremities are intact and symmetrical  Skin: Warm and dry, turgor normal           No rash            No lesion  Psychiatric: mood and affect intact                     speech and thought processes seem appropriate      Assessment and Plan:  1. Pre-op exam/Primary osteoarthritis of right knee  Patient presents to the office for preop examination. Patient is scheduled for total right knee replacement on 5/26/2021 with Dr. Jacques Skinner. Patient has history of osteoarthritis in the right knee. Patient has history of air to bowel syndrome, migraines, osteoarthritis, allergic sinusitis, SPENCER, morbid obesity, pure hypercholesteremia, vitamin D deficiency, benign essential hypertension. Patient reports taking medications as prescribed. All chronic illnesses and history are stable. Patient reports history of postop nausea with anesthesia but no reports of easily bleeding. Patient aware to discontinue use of aspirin and anti-inflammatories 1 week prior to surgery. EKG performed in the office which did reveal normal sinus rhythm, rate 61 with no acute changes from previous EKGs. Patient presented with list of medical screening labs. Unable to perform some of the labs therefore I did call Ortho since he for clarification. Patient will have labs performed at [facility] tomorrow in the lab. Orders placed patient notified. Urinalysis obtained in the office did reveal trace blood therefore culture placed patient aware that she will need to provide another urine sample tomorrow when having blood work done. It is in my medical opinion that this patient is medically stable at this time and okay to proceed with intended surgery/anesthesia. - EKG 12 lead  - POCT Urinalysis no Micro      The note was completed using Dragon voice recognition transcription. Every effort was made to ensure accuracy; however, inadvertent  transcription errors may be present despite my best efforts to edit errors.

## 2021-05-11 ENCOUNTER — OFFICE VISIT (OUTPATIENT)
Dept: FAMILY MEDICINE CLINIC | Age: 61
End: 2021-05-11
Payer: MEDICARE

## 2021-05-11 VITALS
DIASTOLIC BLOOD PRESSURE: 72 MMHG | SYSTOLIC BLOOD PRESSURE: 109 MMHG | OXYGEN SATURATION: 98 % | HEART RATE: 65 BPM | TEMPERATURE: 98.3 F | WEIGHT: 253 LBS | BODY MASS INDEX: 44.82 KG/M2

## 2021-05-11 DIAGNOSIS — Z01.812 PRE-OPERATIVE LABORATORY EXAMINATION: ICD-10-CM

## 2021-05-11 DIAGNOSIS — M17.11 PRIMARY OSTEOARTHRITIS OF RIGHT KNEE: ICD-10-CM

## 2021-05-11 DIAGNOSIS — R31.9 HEMATURIA, UNSPECIFIED TYPE: ICD-10-CM

## 2021-05-11 DIAGNOSIS — Z01.818 PRE-OP EXAM: Primary | ICD-10-CM

## 2021-05-11 LAB
BILIRUBIN, POC: NORMAL
BLOOD URINE, POC: NORMAL
CLARITY, POC: CLEAR
COLOR, POC: YELLOW
GLUCOSE URINE, POC: NORMAL
KETONES, POC: NORMAL
LEUKOCYTE EST, POC: NORMAL
NITRITE, POC: NORMAL
PH, POC: 5
PROTEIN, POC: NORMAL
SPECIFIC GRAVITY, POC: 1.01
UROBILINOGEN, POC: 0.2

## 2021-05-11 PROCEDURE — 99213 OFFICE O/P EST LOW 20 MIN: CPT | Performed by: NURSE PRACTITIONER

## 2021-05-11 PROCEDURE — 81002 URINALYSIS NONAUTO W/O SCOPE: CPT | Performed by: NURSE PRACTITIONER

## 2021-05-11 PROCEDURE — 93000 ELECTROCARDIOGRAM COMPLETE: CPT | Performed by: NURSE PRACTITIONER

## 2021-05-12 ENCOUNTER — HOSPITAL ENCOUNTER (OUTPATIENT)
Age: 61
Discharge: HOME | End: 2021-05-12
Payer: MEDICARE

## 2021-05-12 ENCOUNTER — OFFICE VISIT (OUTPATIENT)
Dept: GASTROENTEROLOGY | Age: 61
End: 2021-05-12
Payer: MEDICARE

## 2021-05-12 VITALS
BODY MASS INDEX: 45.11 KG/M2 | SYSTOLIC BLOOD PRESSURE: 132 MMHG | DIASTOLIC BLOOD PRESSURE: 69 MMHG | HEIGHT: 63 IN | WEIGHT: 254.6 LBS | HEART RATE: 81 BPM

## 2021-05-12 DIAGNOSIS — K58.0 IRRITABLE BOWEL SYNDROME WITH DIARRHEA: Primary | ICD-10-CM

## 2021-05-12 DIAGNOSIS — Z01.818 PRE-OP EXAM: ICD-10-CM

## 2021-05-12 DIAGNOSIS — Z01.812 PRE-OPERATIVE LABORATORY EXAMINATION: ICD-10-CM

## 2021-05-12 DIAGNOSIS — M17.11 PRIMARY OSTEOARTHRITIS OF RIGHT KNEE: ICD-10-CM

## 2021-05-12 DIAGNOSIS — R31.9 HEMATURIA, UNSPECIFIED TYPE: ICD-10-CM

## 2021-05-12 LAB
A/G RATIO: 1.8 (ref 1.1–2.2)
ALBUMIN SERPL-MCNC: 4.2 G/DL (ref 3.4–5)
ALP BLD-CCNC: 79 U/L (ref 40–129)
ALT SERPL-CCNC: 18 U/L (ref 10–40)
ANION GAP SERPL CALCULATED.3IONS-SCNC: 14 MMOL/L (ref 3–16)
AST SERPL-CCNC: 14 U/L (ref 15–37)
BASOPHILS ABSOLUTE: 0.1 K/UL (ref 0–0.2)
BASOPHILS RELATIVE PERCENT: 1 %
BILIRUB SERPL-MCNC: <0.2 MG/DL (ref 0–1)
BUN BLDV-MCNC: 17 MG/DL (ref 7–20)
CALCIUM SERPL-MCNC: 9 MG/DL (ref 8.3–10.6)
CHLORIDE BLD-SCNC: 105 MMOL/L (ref 99–110)
CO2: 21 MMOL/L (ref 21–32)
CREAT SERPL-MCNC: 0.8 MG/DL (ref 0.6–1.2)
EOSINOPHILS ABSOLUTE: 0.3 K/UL (ref 0–0.6)
EOSINOPHILS RELATIVE PERCENT: 3.7 %
GFR AFRICAN AMERICAN: >60
GFR NON-AFRICAN AMERICAN: >60
GLOBULIN: 2.3 G/DL
GLUCOSE BLD-MCNC: 77 MG/DL (ref 70–99)
HCT VFR BLD CALC: 38.4 % (ref 36–48)
HEMOGLOBIN: 13.2 G/DL (ref 12–16)
LYMPHOCYTES ABSOLUTE: 3 K/UL (ref 1–5.1)
LYMPHOCYTES RELATIVE PERCENT: 37.4 %
MCH RBC QN AUTO: 32.1 PG (ref 26–34)
MCHC RBC AUTO-ENTMCNC: 34.2 G/DL (ref 31–36)
MCV RBC AUTO: 93.8 FL (ref 80–100)
MONOCYTES ABSOLUTE: 0.8 K/UL (ref 0–1.3)
MONOCYTES RELATIVE PERCENT: 9.5 %
NEUTROPHILS ABSOLUTE: 4 K/UL (ref 1.7–7.7)
NEUTROPHILS RELATIVE PERCENT: 48.4 %
PDW BLD-RTO: 13.9 % (ref 12.4–15.4)
PLATELET # BLD: 296 K/UL (ref 135–450)
PMV BLD AUTO: 10.7 FL (ref 5–10.5)
POTASSIUM SERPL-SCNC: 5.1 MMOL/L (ref 3.5–5.1)
RBC # BLD: 4.09 M/UL (ref 4–5.2)
SODIUM BLD-SCNC: 140 MMOL/L (ref 136–145)
TOTAL PROTEIN: 6.5 G/DL (ref 6.4–8.2)
WBC # BLD: 8.1 K/UL (ref 4–11)

## 2021-05-12 PROCEDURE — 83036 HEMOGLOBIN GLYCOSYLATED A1C: CPT

## 2021-05-12 PROCEDURE — 80053 COMPREHEN METABOLIC PANEL: CPT

## 2021-05-12 PROCEDURE — 87086 URINE CULTURE/COLONY COUNT: CPT

## 2021-05-12 PROCEDURE — 85025 COMPLETE CBC W/AUTO DIFF WBC: CPT

## 2021-05-12 PROCEDURE — 36415 COLL VENOUS BLD VENIPUNCTURE: CPT

## 2021-05-12 PROCEDURE — 99213 OFFICE O/P EST LOW 20 MIN: CPT | Performed by: INTERNAL MEDICINE

## 2021-05-12 RX ORDER — PSYLLIUM HUSK (WITH SUGAR) 3 G/12 G
2 POWDER (GRAM) ORAL DAILY
COMMUNITY
End: 2021-11-12

## 2021-05-12 RX ORDER — ALOSETRON HYDROCHLORIDE 0.5 MG/1
0.5 TABLET, FILM COATED ORAL 2 TIMES DAILY
Qty: 60 TABLET | Refills: 1 | Status: SHIPPED | OUTPATIENT
Start: 2021-05-12 | End: 2021-06-11

## 2021-05-12 NOTE — PATIENT INSTRUCTIONS
probably have you wait a few days before you add each new group of those foods. Keep a food diary. You can write down the foods you try and note how they make you feel. After a few weeks, you may have a better idea of what foods you should avoid and what foods make you feel your best.  What are the risks? There is some risk of not getting all of the vitamins and nutrients you need on the low-FODMAP diet. These include:  · Folate. · Thiamin. · Vitamin B6.  · Calcium. · Vitamin D. Your dietitian or doctor can help you find other sources of these if needed. This diet may limit your fiber intake. Try to plan your meals to include other sources of fiber. What foods are on the low-FODMAP diet? Here is a guide to foods that you can eat, plus the foods that you should avoid, when you are on the low-FODMAP diet. Grains  Okay to eat: Foods made from grains like arrowroot, buckwheat, corn, millet, and oats. You can also eat potato, quinoa, rice, sorghum, tapioca, and teff. Cereals, pasta, breads, corn tortillas and baked goods made from these grains are also okay. (These grains may be labeled \"gluten-free. \")  Avoid: Grains like wheat, barley, and rye. Avoid ingredients such as bulgur, couscous, durum, and semolina. And avoid cereals, breads, and pastas made from these grains. Avoid chickpea, lentil, and pea flour. Proteins  Okay to eat: Most meat, fish, and eggs without high-FODMAP sauces. You can have small amounts of almonds or hazelnuts (10 nuts). Macadamia nuts, peanuts, pecans, pine nuts, and walnuts are also okay. You can also eat yan and pumpkin seeds, tofu, and tempeh. Avoid: Beans, chickpeas, lentils, and soybeans. Avoid pistachio and cashew nuts. And some sausages may have high-FODMAP ingredients. Dairy  Okay to eat: Lactose-free dairy milks. Rice milk and almond milk are okay. So are lactose-free yogurts, kefirs, ice creams, and sorbet from low-FODMAP fruits and sweeteners.  (These are often labeled \"lactose-free. \") You can have small amounts (2 Tbsp) of cottage, cream, or ricotta cheese. Hard cheeses like cheddar, Argenta, ROSS, and Swiss are okay. So are small amounts (1 oz) of aged or ripened cheeses like Brie, blue, and feta. Avoid: Milk, including cow, goat, and sheep. Avoid condensed or evaporated milk, buttermilk, custard, cream, sour cream, yogurt, and ice cream. Avoid soy milk. (Check sauces for dairy ingredients.)  Vegetables  Okay to eat: Bamboo shoots, bell peppers, bok pavel, up to ½ cup of broccoli or cabbage (red or white), and cucumbers. Eggplant, green beans, lettuce, olives, parsnips, and potatoes are okay to eat. So are pumpkin, rutabaga, seaweed, sprouts, Swiss chard, and spinach. You can eat scallions (green part only) and squash (not butternut). You can eat tomatoes, turnips, watercress, yams, and zucchini. You can also have small amounts of artichoke hearts (from can, 1 oz), carrots, corn (½ cob), and sweet potato (½ cup). Avoid: Artichokes, asparagus, [place] sprouts, pamela cabbage, cauliflower, and celery. And avoid garlic, leeks, mushrooms, okra, onions, scallions (white part), shallots, and peas. Fruits  Okay to eat: Bananas, blueberries, cantaloupe, coconut, grapes, and honeydew. Kiwi, jamaal, limes, oranges, passion fruit, papaya, and pineapple are also okay. You can eat plantain, raspberries, rhubarb, star fruit, strawberries, tangelo, and tangerine. You can also have small amounts of dried banana chips (up to 10 chips), dried cranberries (1 Tbsp), and shredded coconut (up to ¼ cup). Avoid: Apples, applesauce, apricots, avocados, blackberries, boysenberries, and cherries. Also avoid dates, figs, grapefruit, guava, lychee, and mangoes. Don't eat nectarines, peaches, pears, persimmon, plums, prunes, tamarillo, or watermelon. And limit most canned and dried fruits.   Oils, spices, condiments, and sweeteners  Okay to eat: Vegetable oils (including garlic infused), butter, ghee, lard, and margarine (no trans fat). You can have most fresh herbs like basil, chives, coriander, haroon, parsley, rosemary and thyme. You can have salt, jams made from low-FODMAP fruits, mayonnaise, and mustard. Soy sauce, hot sauce (no garlic), tamari, and vinegar are also okay. Sweeteners that are okay include sugar (sucrose), powdered (confectioner's) sugar, brown sugar, glucose, and maple syrup. You can also have some artificial sweeteners like aspartame, saccharine, and stevia. Avoid: Chutneys, hummus, jellies, garlic sauces, and gravies made with onion or garlic. Avoid pickles, relish, some salad dressings and soup stocks, salsa, and tomato paste. And avoid sauces and other foods with high fructose corn syrup, honey, molasses, and agave. Avoid artificial sweeteners (isomalt, mannitol, malitol, sorbitol, and xylitol). Avoid corn syrup solids, fructose, fruit juice concentrate, and polydextrose. Other foods and drinks  Okay to have: Water, soda water, tonic, soft drinks sweetened with sugar, ½ cup of low-FODMAP fruit juice, and most teas and alcohols. You can also eat foods made with baking powder and soda, cocoa, and gelatin. Avoid: Juices from high-FODMAP fruits and vegetables. And avoid fortified rudy, chamomile and fennel teas, chicory-based drinks and coffee substitutes, and bouillon cubes. Follow-up care is a key part of your treatment and safety. Be sure to make and go to all appointments, and call your doctor if you are having problems. It's also a good idea to know your test results and keep a list of the medicines you take. Where can you learn more? Go to https://grace.[employer]. org and sign in to your Redapt account. Enter L235 in the SocialSmack box to learn more about \"Learning About the Low FODMAP Diet for Irritable Bowel Syndrome (IBS). \"     If you do not have an account, please click on the \"Sign Up Now\" link.   Current as of: December 17, 2020               Content Version: 12.8  © 2006-2021 [employer]. Care instructions adapted under license by [facility] ([facility]). If you have questions about a medical condition or this instruction, always ask your healthcare professional. [address] any warranty or liability for your use of this information. Patient Education        Irritable Bowel Syndrome: Care Instructions  Your Care Instructions  Irritable bowel syndrome, or IBS, is a problem with the intestines that causes belly pain, bloating, gas, constipation, and diarrhea. The cause of IBS is not well known. IBS can last for many years, but it does not get worse over time or lead to serious disease. Most people can control their symptoms by changing their diet and reducing stress. Follow-up care is a key part of your treatment and safety. Be sure to make and go to all appointments, and call your doctor if you are having problems. It's also a good idea to know your test results and keep a list of the medicines you take. How can you care for yourself at home? · Prevent diarrhea:  ? Limit the amount of high-fiber foods you eat. This includes vegetables, fruits, whole-grain breads and pasta, high-fiber cereal, and brown rice. ? Limit dairy products. ? Limit artificial sweeteners such as sorbitol and xylitol. · Avoid constipation:  ? Include fruits, vegetables, beans, and whole grains in your diet each day. These foods are high in fiber. ? Drink plenty of fluids. If you have kidney, heart, or liver disease and have to limit fluids, talk with your doctor before you increase the amount of fluids you drink. ? Get some exercise every day. Build up slowly to 30 to 60 minutes a day on 5 or more days of the week. ? Take a fiber supplement, such as Citrucel or Metamucil, every day if needed. Read and follow all instructions on the label. ? Schedule time each day for a bowel movement. Having a daily routine may help. Take your time and do not strain when having a bowel movement. · To help relieve bloating or gas, avoid foods such as beans, cabbage, cauliflower, or broccoli. · Keep a daily diary of what you eat and what symptoms you have. This may help find foods that cause you problems. · Eat slowly. Try to make mealtime relaxing. · Find ways to reduce stress. When should you call for help? Call your doctor now or seek immediate medical care if:    · Your pain is different than usual or occurs with fever.     · You lose weight without trying, or you lose your appetite and you do not know why.     · Your symptoms often wake you from sleep.     · Your stools are black and tarlike or have streaks of blood. Watch closely for changes in your health, and be sure to contact your doctor if:    · Your IBS symptoms get worse or begin to disrupt your day-to-day life.     · You become more tired than usual.     · Your home treatment stops working. Where can you learn more? Go to https://Symptify.Grid2Home. org and sign in to your Iris Mobile account. Enter U040 in the Viyet box to learn more about \"Irritable Bowel Syndrome: Care Instructions. \"     If you do not have an account, please click on the \"Sign Up Now\" link. Current as of: April 15, 2020               Content Version: 12.8  © 4738-5999 [employer]. Care instructions adapted under license by [facility] ([facility]). If you have questions about a medical condition or this instruction, always ask your healthcare professional. [place] [address] any warranty or liability for your use of this information.

## 2021-05-12 NOTE — PROGRESS NOTES
Kaden Mittal    [address]  [address] [place] [address]  Phone: [phone]       CHIEF COMPLAINT  Chief Complaint   Patient presents with    Follow-up     (former Dr. Sammie Pryor patient) IBS with diarrhea        SUBJECTIVE  Marcel Johnson is a 63 y.o. female with history of remote gastric bypass, obesity (BMI 45), obstructive sleep apnea, hypertension and osteoarthritis of the knees who returns to [facility] care. Patient previously followed with GI Dr. Sammie Pryor for IBS with diarrhea on Bentyl. She reports chronic diarrhea of 2 years duration; occurring in the mornings with 3-4 times nonbloody loose stools. Nocturnal.  Previously tried immodium, fiber without success. Patient underwent colonoscopy on 1/13/2021 with findings of normal colonic mucosa, random biopsies negative for microscopic colitis. Denies abdominal pain, nausea, vomiting, fever, chills, unintentional weight loss, constipation, hematochezia or melenic stools. Date of last office visit and details: 1/13/2021 with Dr. Celestino Pantoja presents for screening colonoscopy. There is no history of colon adenomas but 2 hyperplastic polyps or cancer. There is no family history of colon polyps or cancer. She is not experiencing any symptoms presently.       Last Encounter Reviewed:   Pertinent PMH, FH, SH is reviewed below. Last EGD: none  Last Colonoscopy 1/13/2021: Normal colonic mucosa throughout. Unable to intubate the terminal ileum or enter the cecum due to patient intolerance, looping, and a redundant colon despite repositioning and external pressure. Multiple random biopsied (pathbenign colonic mucosa, negative for microscopic colitis). Internal hemorrhoids. Recommended to repeat in 10 years. Barium enema 3/11/2021 -unremarkable. Colonoscopy: 5/13/2013 --  2 small sigmoid hyperplastic polyps.       PAST MEDICAL HISTORY     Past Medical History:   Diagnosis Date    Chest pain 11/10    negative myoview stress    Hematuria 10/2016    neg cysto, Dr Lynda Rebolledo    Hypertension     PONV (postoperative nausea and vomiting)     Primary osteoarthritis involving multiple joints     hands, Dr Moises Amaral Routine health maintenance      TAC 5/13    Sleep apnea     did not tolerate CPAP    Tarsal tunnel syndrome 2014     FAMILY HISTORY     Family History   Problem Relation Age of Onset    High Blood Pressure Mother     High Cholesterol Mother     Diabetes Mother     Stroke Mother     High Blood Pressure Father     High Cholesterol Father     Asthma Father     High Blood Pressure Brother     High Cholesterol Brother     Heart Disease Brother 62    Diabetes Brother      SOCIAL HISTORY     Social History     Socioeconomic History    Marital status:      Spouse name: Not on file    Number of children: Not on file    Years of education: Not on file    Highest education level: Not on file   Occupational History    Not on file   Social Needs    Financial resource strain: Not on file    Food insecurity     Worry: Not on file     Inability: Not on file    Transportation needs     Medical: Not on file     Non-medical: Not on file   Tobacco Use    Smoking status: Former Smoker     Packs/day: 1.00     Years: 42.00     Pack years: 42.00     Types: Cigarettes    Smokeless tobacco: Former User     Quit date: 3/2/2021   Substance and Sexual Activity    Alcohol use: No     Alcohol/week: 0.0 standard drinks    Drug use: No    Sexual activity: Not Currently   Lifestyle    Physical activity     Days per week: Not on file     Minutes per session: Not on file    Stress: Not on file   Relationships    Social connections     Talks on phone: Not on file     Gets together: Not on file     Attends [employer] service: Not on file     Active member of club or organization: Not on file     Attends meetings of clubs or organizations: Not on file     Relationship status: Not on file   Homero [place]ster Intimate partner violence     Fear of current or ex partner: Not on file     Emotionally abused: Not on file     Physically abused: Not on file     Forced sexual activity: Not on file   Other Topics Concern    Not on file   Social History Narrative    Not on file     SURGICAL HISTORY     Past Surgical History:   Procedure Laterality Date    ARTHROPLASTY Right 10/21/2019    RIGHT THUMB CARPOMETACARPAL ARTHROPLASTY -BLOCK- -SLEEP APNEA- performed by Wendy Trimble MD at [place] [address]    stomach stapling    COLONOSCOPY  2011    polyps    COLONOSCOPY  01/13/2021    COLONOSCOPY N/A 1/13/2021    COLONOSCOPY WITH BIOPSY performed by Suraj Long [facility] at [facility] Left 12/9/14    plantar fasciotomy, tendon transfer, gastrocnemius recession/cortisone    HAND TENDON SURGERY Right 10/21/2019    FLEXOR CARPI RADIALIS TENDON INTERPOSITION performed by Wendy Trimble MD at [address] [place] [address] ARTHROSCOPY      OTHER SURGICAL HISTORY  10/14/2016    cystoscopy, bilateral retrogrades, urethral dilation    URETHRAL STRICTURE DILATATION  7/14     CURRENT MEDICATIONS   (This list may include medications prescribed during this encounter as epic can not insert only the list prior to this encounter.)  Current Outpatient Rx   Medication Sig Dispense Refill    Psyllium (FIBER) 28.3 % POWD Take 2 Units by mouth daily 2 Tablespoons daily      alosetron (LOTRONEX) 0.5 MG tablet Take 1 tablet by mouth 2 times daily 60 tablet 1    varenicline (CHANTIX CONTINUING MONTH EZEKIEL) 1 MG tablet Take 1 tablet by mouth 2 times daily 60 tablet 5    lisinopril-hydroCHLOROthiazide (PRINZIDE;ZESTORETIC) 10-12.5 MG per tablet TAKE 1 TABLET ONCE DAILY 90 tablet 1    dicyclomine (BENTYL) 10 MG capsule TAKE 1 OR 2 CAPSULES BY MOUTH THREE TIMES A DAY BEFORE MEALS AS NEEDED FOR DIARRHEA 540 capsule 0    oxyCODONE-acetaminophen (PERCOCET) 7.5-325 MG per tablet  Cholecalciferol (VITAMIN D3) 125 MCG (5000 UT) TABS Take by mouth      Simethicone (GAS RELIEF 125 MAX ST PO) Take by mouth      meloxicam (MOBIC) 15 MG tablet Take 15 mg by mouth daily      aspirin 81 MG tablet Take 81 mg by mouth daily      gabapentin (NEURONTIN) 300 MG capsule TAKE THREE CAPSULES BY MOUTH THREE TIMES A  capsule 1    varenicline (CHANTIX STARTING MONTH EZEKIEL) 0.5 MG X 11 & 1 MG X 42 tablet Take by mouth. (Patient not taking: Reported on 5/12/2021) 1 box 0     ALLERGIES     Allergies   Allergen Reactions    Codeine     Morphine And Related     Sulfa Antibiotics Other (See Comments)     Caused elevation of WBC  elevated WBC    Itraconazole      Swelling of feet     IMMUNIZATIONS     Immunization History   Administered Date(s) Administered    COVID-19, Pfizer, PF, 30mcg/0.3mL 01/23/2021, 02/13/2021    Hepatitis A Adult (Havrix, Vaqta) 01/28/2019, 07/30/2019    Influenza Virus Vaccine 10/11/2010, 10/10/2012, 10/10/2014, 10/12/2015, 09/03/2018    Influenza Whole 11/11/2009    Influenza, Quadv, IM, (6 mo and older Fluzone, Flulaval, Fluarix and 3 yrs and older Afluria) 12/20/2016    Influenza, Quadv, IM, PF (6 mo and older Fluzone, Flulaval, Fluarix, and 3 yrs and older Afluria) 09/01/2020    Pneumococcal Polysaccharide (Inigricbl33) 01/05/2016    Td, unspecified formulation 08/24/2004    Tdap (Boostrix, Adacel) 11/25/2014    Zoster Live (Zostavax) 03/02/2016       REVIEW OF SYSTEMS   See HPI for further details and pertinent postiives. Negative for the following:  Constitutional: Negative for weight change. Negative for appetite change and fatigue. HENT: Negative for nosebleeds, sore throat, mouth sores, and voice change. Respiratory: Negative for cough, choking and chest tightness. Cardiovascular: Negative for chest pain   Gastrointestinal: See HPI  Musculoskeletal: Negative for arthralgias. Skin: Negative for pallor.    Neurological: Negative for weakness and light-headedness. Hematological: Negative for adenopathy. Does not bruise/bleed easily. Psychiatric/Behavioral: Negative for suicidal ideas. PHYSICAL EXAM   VITAL SIGNS: /69 (Site: Left Wrist, Position: Sitting, Cuff Size: Medium Adult)   Pulse 81   Ht 5' 3\" (1.6 m)   Wt 254 lb 9.6 oz (115.5 kg)   BMI 45.10 kg/m²   Wt Readings from Last 3 Encounters:   05/12/21 254 lb 9.6 oz (115.5 kg)   05/11/21 253 lb (114.8 kg)   03/29/21 241 lb 2 oz (109.4 kg)     Constitutional: Obese female, well developed, Well nourished, No acute distress, Non-toxic appearance. HENT: Normocephalic, Atraumatic, Bilateral external ears normal, Oropharynx moist, No oral exudates, Nose normal.   Eyes: Conjunctiva normal, No discharge. Neck: Normal range of motion, No tenderness, Supple, No stridor. Cardiovascular: Normal heart rate, Normal rhythm, No murmurs, No rubs, No gallops. Thorax & Lungs: Normal breath sounds, No respiratory distress, No wheezing, No chest tenderness  Abdomen: Pannus abdomen, normal bowel sounds, soft, non tender, non distended, no hernias,     Rectal:  Deferred. Skin: Warm, Dry, No erythema, No rash. No bruising. No spider hemangiomas. Lower Extremities: Intact distal pulses, No edema, No tenderness, No cyanosis, No clubbing. Neurologic: Alert & oriented x 3, Normal motor function, Normal sensory function, No focal deficits noted. No results found. FINAL IMPRESSION   Ubaldo Brown was seen today for follow-up. Diagnoses and all orders for this visit:    Irritable bowel syndrome with diarrhea  -     alosetron (LOTRONEX) 0.5 MG tablet; Take 1 tablet by mouth 2 times daily  - Low FODMAP diet. - Avoidance of NSAIDs. No orders of the defined types were placed in this encounter. ORDERED FUTURE/PENDING TESTS     Lab Frequency Next Occurrence       FOLLOWUP   Return in about 3 months (around 8/12/2021).           Lakshmi Paniagua 5/12/21 1:55 PM EDT    CC:  RUBEN Davis - CNP

## 2021-05-13 ENCOUNTER — TELEPHONE (OUTPATIENT)
Dept: GASTROENTEROLOGY | Age: 61
End: 2021-05-13

## 2021-05-13 LAB
ESTIMATED AVERAGE GLUCOSE: 108.3 MG/DL
HBA1C MFR BLD: 5.4 %
URINE CULTURE, ROUTINE: NORMAL

## 2021-05-13 NOTE — TELEPHONE ENCOUNTER
alosetron (LOTRONEX) 0.5 MG tablet    Patient states medication is over 200$ a month with insurance. She states she can not afford that and would like something else called in.  Please advise

## 2021-05-14 NOTE — TELEPHONE ENCOUNTER
I have sent a scrip for Rifaximin for 14 days. With failure of OTC medications - immodium and fiber, the cost of the subsequent optional meds are fairly expensive.

## 2021-05-18 ENCOUNTER — HOSPITAL ENCOUNTER (OUTPATIENT)
Age: 61
Discharge: HOME | End: 2021-05-18
Payer: MEDICARE

## 2021-05-18 LAB
APTT: 29.9 SEC (ref 24.2–36.2)
INR BLD: 0.99 (ref 0.86–1.14)
PROTHROMBIN TIME: 11.5 SEC (ref 10–13.2)

## 2021-05-18 PROCEDURE — 85730 THROMBOPLASTIN TIME PARTIAL: CPT

## 2021-05-18 PROCEDURE — 36415 COLL VENOUS BLD VENIPUNCTURE: CPT

## 2021-05-18 PROCEDURE — 85610 PROTHROMBIN TIME: CPT

## 2021-06-04 ENCOUNTER — TELEPHONE (OUTPATIENT)
Dept: CASE MANAGEMENT | Age: 61
End: 2021-06-04

## 2021-06-04 DIAGNOSIS — Z12.2 ENCOUNTER FOR SCREENING FOR LUNG CANCER: Primary | ICD-10-CM

## 2021-06-04 NOTE — TELEPHONE ENCOUNTER
Patient due for annual CT Lung Screening. If you would like patient to have screening, please place order for CT Lung Screening ([id number]). Patient due for annual CT Lung Screening. Reminder letter mailed.       Thank you,  Chriss Hernandez RN  [facility] Lung Navigator  [phone]

## 2021-07-08 NOTE — TELEPHONE ENCOUNTER
Alosetron HCl 0.5MG tablets  Approved on May 12  PA Case: [id number], Status: Approved, Coverage Starts on: 2/10/2021 12:00:00 AM, Coverage Ends on: 5/12/2022 12:00:00 AM.

## 2021-11-11 PROBLEM — F17.200 SMOKER: Status: RESOLVED | Noted: 2018-12-04 | Resolved: 2021-11-11

## 2021-11-11 NOTE — PROGRESS NOTES
CHIEF COMPLAINT  Chief Complaint   Patient presents with   China Lighter     HTN        HPI   Merlinda Charm is a 57 y.o. female who presents to the office check up. Warts doing well. Patient reports that she has been more compliant with diet and helping her  get healthier. No dizziness, lightheadedness, chest pain or shortness of breath. No abdominal pain or discomfort. No nausea, vomiting, constipation. No dark or tarry stools. Patient reports arthralgias worse at night. Patient also reporting increase in urinary frequency throughout the night. Patient denies any pain with urination. She reports take medications as prescribed. No other complaints, modifying factors or associated symptoms. Nursing notes reviewed.    Past Medical History:   Diagnosis Date    Chest pain 11/10    negative myoview stress    Hematuria 10/2016    neg cysto, Dr Siobhan Quinteros    Hypertension     PONV (postoperative nausea and vomiting)     Primary osteoarthritis involving multiple joints     hands, Dr Don Turner Routine health maintenance      TAC 5/13    Sleep apnea     did not tolerate CPAP    Smoker 12/4/2018    Tarsal tunnel syndrome 2014     Past Surgical History:   Procedure Laterality Date    ARTHROPLASTY Right 10/21/2019    RIGHT THUMB CARPOMETACARPAL ARTHROPLASTY -BLOCK- -SLEEP APNEA- performed by Rosi Orourke MD at [facility] [address]  2004    stomach stapling    COLONOSCOPY  2011    polyps    COLONOSCOPY  01/13/2021    COLONOSCOPY N/A 1/13/2021    COLONOSCOPY WITH BIOPSY performed by Nellie Mantilla MD at [facility] Left 12/9/14    plantar fasciotomy, tendon transfer, gastrocnemius recession/cortisone    HAND TENDON SURGERY Right 10/21/2019    FLEXOR CARPI RADIALIS TENDON INTERPOSITION performed by Rosi Orourke MD at [facility] ARTHROSCOPY      OTHER SURGICAL HISTORY  10/14/2016    cystoscopy, bilateral retrogrades, urethral dilation    URETHRAL STRICTURE DILATATION  7/14     Family History   Problem Relation Age of Onset    High Blood Pressure Mother     High Cholesterol Mother     Diabetes Mother     Stroke Mother     High Blood Pressure Father     High Cholesterol Father     Asthma Father     High Blood Pressure Brother     High Cholesterol Brother     Heart Disease Brother 62    Diabetes Brother      Social History     Socioeconomic History    Marital status:      Spouse name: Not on file    Number of children: Not on file    Years of education: Not on file    Highest education level: Not on file   Occupational History    Not on file   Tobacco Use    Smoking status: Current Every Day Smoker     Packs/day: 1.00     Years: 42.00     Pack years: 42.00     Types: Cigarettes    Smokeless tobacco: Former User     Quit date: 3/2/2021   Vaping Use    Vaping Use: Never used   Substance and Sexual Activity    Alcohol use: No     Alcohol/week: 0.0 standard drinks    Drug use: No    Sexual activity: Not Currently   Other Topics Concern    Not on file   Social History Narrative    Not on file     Social Determinants of Health     Financial Resource Strain:     Difficulty of Paying Living Expenses: Not on file   Food Insecurity:     Worried About Running Out of Food in the Last Year: Not on file    Naomie of Food in the Last Year: Not on file   Transportation Needs:     Lack of Transportation (Medical): Not on file    Lack of Transportation (Non-Medical):  Not on file   Physical Activity:     Days of Exercise per Week: Not on file    Minutes of Exercise per Session: Not on file   Stress:     Feeling of Stress : Not on file   Social Connections:     Frequency of Communication with Friends and Family: Not on file    Frequency of Social Gatherings with Friends and Family: Not on file    Attends Hoahaoism Services: Not on file    Active Member of Clubs or Organizations: Not on file   Peri Attends Club or Organization Meetings: Not on file    Marital Status: Not on file   Intimate Partner Violence:     Fear of Current or Ex-Partner: Not on file    Emotionally Abused: Not on file    Physically Abused: Not on file    Sexually Abused: Not on file   Housing Stability:     Unable to Pay for Housing in the Last Year: Not on file    Number of Levi in the Last Year: Not on file    Unstable Housing in the Last Year: Not on file     Current Outpatient Medications   Medication Sig Dispense Refill    lisinopril-hydroCHLOROthiazide (PRINZIDE;ZESTORETIC) 10-12.5 MG per tablet TAKE 1 TABLET ONCE DAILY 90 tablet 1    dicyclomine (BENTYL) 10 MG capsule TAKE 1 OR 2 CAPSULES BY MOUTH THREE TIMES A DAY BEFORE MEALS AS NEEDED FOR DIARRHEA 540 capsule 0    oxyCODONE-acetaminophen (PERCOCET) 7.5-325 MG per tablet       Cholecalciferol (VITAMIN D3) 125 MCG (5000 UT) TABS Take by mouth      Simethicone (GAS RELIEF 125 MAX ST PO) Take by mouth      meloxicam (MOBIC) 15 MG tablet Take 15 mg by mouth daily      aspirin 81 MG tablet Take 81 mg by mouth daily      gabapentin (NEURONTIN) 300 MG capsule TAKE THREE CAPSULES BY MOUTH THREE TIMES A  capsule 1    alosetron (LOTRONEX) 0.5 MG tablet Take 1 tablet by mouth 2 times daily 60 tablet 1     No current facility-administered medications for this visit. Allergies   Allergen Reactions    Codeine     Morphine And Related     Sulfa Antibiotics Other (See Comments)     Caused elevation of WBC  elevated WBC    Itraconazole      Swelling of feet       REVIEW OF SYSTEMS  Review of Systems   Constitutional: Negative for activity change, appetite change, chills and fever. HENT: Negative for congestion, rhinorrhea and sore throat. Eyes: Negative for visual disturbance. Respiratory: Negative for cough, shortness of breath and wheezing. Cardiovascular: Negative for chest pain and leg swelling.    Gastrointestinal: Negative for abdominal pain, diarrhea, nausea and vomiting. Genitourinary: Negative for dysuria, frequency, hematuria and urgency. Urinary frequency at night   Musculoskeletal: Positive for arthralgias. Negative for gait problem and myalgias. Skin: Negative for rash. Neurological: Negative for dizziness, weakness, light-headedness, numbness and headaches. Psychiatric/Behavioral: Negative for self-injury and sleep disturbance. The patient is not nervous/anxious. PHYSICAL EXAM  /70   Pulse 73   Temp 99.1 °F (37.3 °C) (Oral)   Wt 258 lb 6 oz (117.2 kg)   SpO2 96%   BMI 45.77 kg/m²   Physical Exam  Vitals reviewed. Constitutional:       General: She is not in acute distress. Appearance: Normal appearance. She is well-developed. She is not diaphoretic. HENT:      Head: Normocephalic and atraumatic. Right Ear: External ear normal.      Left Ear: External ear normal.      Nose: Nose normal.      Mouth/Throat:      Mouth: Mucous membranes are moist.   Eyes:      General:         Right eye: No discharge. Left eye: No discharge. Pupils: Pupils are equal, round, and reactive to light. Neck:      Vascular: No JVD. Cardiovascular:      Rate and Rhythm: Normal rate and regular rhythm. Pulses: Normal pulses. Pulmonary:      Effort: Pulmonary effort is normal. No respiratory distress. Breath sounds: No stridor. No wheezing, rhonchi or rales. Chest:      Chest wall: No tenderness. Abdominal:      General: Bowel sounds are normal.      Palpations: Abdomen is soft. Musculoskeletal:         General: No swelling or deformity. Normal range of motion. Cervical back: Normal range of motion and neck supple. Skin:     General: Skin is warm and dry. Capillary Refill: Capillary refill takes less than 2 seconds. Coloration: Skin is not pale. Findings: No bruising, lesion or rash. Neurological:      General: No focal deficit present.       Mental Status: She is alert and Continue current treatment management follow-up in 6 to 12 months, sooner for new or worsening symptoms. 7. Breast cancer screening by mammogram  Preventative screening recommended. Orders placed patient encouraged to call and schedule testing.  - JOSH DIGITAL SCREEN W OR WO CAD BILATERAL; Future    8. Urinary frequency  Patient reports urinary urgency/frequency worse at night. Patient does report drinking caffeinated beverages throughout the day but reports that she is tries to stop around dinner. Patient denies any burning with urination or blood in urine. Patient has seen urologist years ago but denies any recent follow-up. Urinalysis obtained for further evaluation in the office. Patient reports that the overactive bladder is not as noticeable throughout the day because she is at home but effects are worse at night. We did discuss that depending on lab work that medications may be helpful. Patient verbalized and acknowledges with plan of care at this time. - POCT Urinalysis no Micro     Patient aware that she is due for CT lung screening. Orders placed at previous visit. Patient encouraged to call and schedule testing will follow-up with results. The note was completed using Dragon voice recognition transcription. Every effort was made to ensure accuracy; however, inadvertent  transcription errors may be present despite my best efforts to edit errors.     Lewis Velásquez, RUBEN - CNP

## 2021-11-12 ENCOUNTER — OFFICE VISIT (OUTPATIENT)
Dept: FAMILY MEDICINE CLINIC | Age: 61
End: 2021-11-12
Payer: MEDICARE

## 2021-11-12 VITALS
DIASTOLIC BLOOD PRESSURE: 70 MMHG | OXYGEN SATURATION: 96 % | TEMPERATURE: 99.1 F | SYSTOLIC BLOOD PRESSURE: 102 MMHG | WEIGHT: 258.38 LBS | BODY MASS INDEX: 45.77 KG/M2 | HEART RATE: 73 BPM

## 2021-11-12 DIAGNOSIS — M15.9 PRIMARY OSTEOARTHRITIS INVOLVING MULTIPLE JOINTS: ICD-10-CM

## 2021-11-12 DIAGNOSIS — E55.9 VITAMIN D DEFICIENCY: ICD-10-CM

## 2021-11-12 DIAGNOSIS — R35.0 URINARY FREQUENCY: ICD-10-CM

## 2021-11-12 DIAGNOSIS — G47.33 OSA (OBSTRUCTIVE SLEEP APNEA): ICD-10-CM

## 2021-11-12 DIAGNOSIS — E78.00 PURE HYPERCHOLESTEROLEMIA: ICD-10-CM

## 2021-11-12 DIAGNOSIS — Z12.31 BREAST CANCER SCREENING BY MAMMOGRAM: ICD-10-CM

## 2021-11-12 DIAGNOSIS — K58.0 IRRITABLE BOWEL SYNDROME WITH DIARRHEA: ICD-10-CM

## 2021-11-12 DIAGNOSIS — I10 BENIGN ESSENTIAL HYPERTENSION: Primary | ICD-10-CM

## 2021-11-12 LAB
A/G RATIO: 1.7 (ref 1.1–2.2)
ALBUMIN SERPL-MCNC: 4.6 G/DL (ref 3.4–5)
ALP BLD-CCNC: 90 U/L (ref 40–129)
ALT SERPL-CCNC: 22 U/L (ref 10–40)
ANION GAP SERPL CALCULATED.3IONS-SCNC: 14 MMOL/L (ref 3–16)
AST SERPL-CCNC: 22 U/L (ref 15–37)
BASOPHILS ABSOLUTE: 0.1 K/UL (ref 0–0.2)
BASOPHILS RELATIVE PERCENT: 0.7 %
BILIRUB SERPL-MCNC: 0.3 MG/DL (ref 0–1)
BILIRUBIN, POC: ABNORMAL
BLOOD URINE, POC: ABNORMAL
BUN BLDV-MCNC: 20 MG/DL (ref 7–20)
CALCIUM SERPL-MCNC: 9.6 MG/DL (ref 8.3–10.6)
CHLORIDE BLD-SCNC: 102 MMOL/L (ref 99–110)
CHOLESTEROL, FASTING: 195 MG/DL (ref 0–199)
CLARITY, POC: CLEAR
CO2: 23 MMOL/L (ref 21–32)
COLOR, POC: YELLOW
CREAT SERPL-MCNC: 0.7 MG/DL (ref 0.6–1.2)
EOSINOPHILS ABSOLUTE: 0.2 K/UL (ref 0–0.6)
EOSINOPHILS RELATIVE PERCENT: 2.7 %
GFR AFRICAN AMERICAN: >60
GFR NON-AFRICAN AMERICAN: >60
GLUCOSE BLD-MCNC: 95 MG/DL (ref 70–99)
GLUCOSE URINE, POC: ABNORMAL
HCT VFR BLD CALC: 41.6 % (ref 36–48)
HDLC SERPL-MCNC: 38 MG/DL (ref 40–60)
HEMOGLOBIN: 13.4 G/DL (ref 12–16)
KETONES, POC: ABNORMAL
LDL CHOLESTEROL CALCULATED: 134 MG/DL
LEUKOCYTE EST, POC: ABNORMAL
LYMPHOCYTES ABSOLUTE: 2.5 K/UL (ref 1–5.1)
LYMPHOCYTES RELATIVE PERCENT: 28.9 %
MCH RBC QN AUTO: 29.4 PG (ref 26–34)
MCHC RBC AUTO-ENTMCNC: 32.3 G/DL (ref 31–36)
MCV RBC AUTO: 91 FL (ref 80–100)
MONOCYTES ABSOLUTE: 0.8 K/UL (ref 0–1.3)
MONOCYTES RELATIVE PERCENT: 9.3 %
NEUTROPHILS ABSOLUTE: 5.1 K/UL (ref 1.7–7.7)
NEUTROPHILS RELATIVE PERCENT: 58.4 %
NITRITE, POC: POSITIVE
PDW BLD-RTO: 15.1 % (ref 12.4–15.4)
PH, POC: 5
PLATELET # BLD: 309 K/UL (ref 135–450)
PMV BLD AUTO: 10.1 FL (ref 5–10.5)
POTASSIUM SERPL-SCNC: 4.7 MMOL/L (ref 3.5–5.1)
PROTEIN, POC: ABNORMAL
RBC # BLD: 4.57 M/UL (ref 4–5.2)
SODIUM BLD-SCNC: 139 MMOL/L (ref 136–145)
SPECIFIC GRAVITY, POC: 1.02
TOTAL PROTEIN: 7.3 G/DL (ref 6.4–8.2)
TRIGLYCERIDE, FASTING: 116 MG/DL (ref 0–150)
UROBILINOGEN, POC: 0.2
VITAMIN D 25-HYDROXY: 41.6 NG/ML
VLDLC SERPL CALC-MCNC: 23 MG/DL
WBC # BLD: 8.8 K/UL (ref 4–11)

## 2021-11-12 PROCEDURE — 99214 OFFICE O/P EST MOD 30 MIN: CPT | Performed by: NURSE PRACTITIONER

## 2021-11-12 PROCEDURE — 81002 URINALYSIS NONAUTO W/O SCOPE: CPT | Performed by: NURSE PRACTITIONER

## 2021-11-12 PROCEDURE — 36415 COLL VENOUS BLD VENIPUNCTURE: CPT | Performed by: NURSE PRACTITIONER

## 2021-11-12 RX ORDER — NITROFURANTOIN 25; 75 MG/1; MG/1
100 CAPSULE ORAL 2 TIMES DAILY
Qty: 14 CAPSULE | Refills: 0 | Status: SHIPPED | OUTPATIENT
Start: 2021-11-12 | End: 2021-11-19

## 2021-11-12 RX ORDER — LISINOPRIL AND HYDROCHLOROTHIAZIDE 12.5; 1 MG/1; MG/1
TABLET ORAL
Qty: 90 TABLET | Refills: 1 | Status: SHIPPED | OUTPATIENT
Start: 2021-11-12 | End: 2022-03-08 | Stop reason: SDUPTHER

## 2021-11-12 ASSESSMENT — ENCOUNTER SYMPTOMS
ABDOMINAL PAIN: 0
WHEEZING: 0
SORE THROAT: 0
VOMITING: 0
RHINORRHEA: 0
DIARRHEA: 0
COUGH: 0
SHORTNESS OF BREATH: 0
NAUSEA: 0

## 2021-11-12 NOTE — PATIENT INSTRUCTIONS
Please read the healthy family handout that you were given and share it with your family. Please compare this printed medication list with your medications at home to be sure they are the same. If you have any medications that are different please contact us immediately at [phone]. Also review your allergies that we have listed, these may also include medications that you have not been able to tolerate, make sure everything listed is correct. If you have any allergies that are different please contact us immediately at [phone]. Patient Education        Stopping Smoking: Care Instructions  Your Care Instructions     Cigarette smokers crave the nicotine in cigarettes. Giving it up is much harder than simply changing a habit. Your body has to stop craving the nicotine. It is hard to quit, but you can do it. There are many tools that people use to quit smoking. You may find that combining tools works best for you. There are several steps to quitting. First you get ready to quit. Then you get support to help you. After that, you learn new skills and behaviors to become a nonsmoker. For many people, a necessary step is getting and using medicine. Your doctor will help you set up the plan that best meets your needs. You may want to attend a smoking cessation program to help you quit smoking. When you choose a program, look for one that has proven success. Ask your doctor for ideas. You will greatly increase your chances of success if you take medicine as well as get counseling or join a cessation program.  Some of the changes you feel when you first quit tobacco are uncomfortable. Your body will miss the nicotine at first, and you may feel short-tempered and grumpy. You may have trouble sleeping or concentrating. Medicine can help you deal with these symptoms. You may struggle with changing your smoking habits and rituals. The last step is the tricky one:  Be prepared for the smoking urge to continue for a time. This is a lot to deal with, but keep at it. You will feel better. Follow-up care is a key part of your treatment and safety. Be sure to make and go to all appointments, and call your doctor if you are having problems. It's also a good idea to know your test results and keep a list of the medicines you take. How can you care for yourself at home? · Ask your family, friends, and coworkers for support. You have a better chance of quitting if you have help and support. · Join a support group, such as Nicotine Anonymous, for people who are trying to quit smoking. · Consider signing up for a smoking cessation program, such as the American Lung Association's Freedom from Smoking program.  · Get text messaging support. Go to the website at www.smokefree. gov to sign up for the [facility] program.  · Set a quit date. Pick your date carefully so that it is not right in the middle of a big deadline or stressful time. Once you quit, do not even take a puff. Get rid of all ashtrays and lighters after your last cigarette. Clean your house and your clothes so that they do not smell of smoke. · Learn how to be a nonsmoker. Think about ways you can avoid those things that make you reach for a cigarette. ? Avoid situations that put you at greatest risk for smoking. For some people, it is hard to have a drink with friends without smoking. For others, they might skip a coffee break with coworkers who smoke. ? Change your daily routine. Take a different route to work or eat a meal in a different place. · Cut down on stress. Calm yourself or release tension by doing an activity you enjoy, such as reading a book, taking a hot bath, or gardening. · Talk to your doctor or pharmacist about nicotine replacement therapy, which replaces the nicotine in your body. You still get nicotine but you do not use tobacco. Nicotine replacement products help you slowly reduce the amount of nicotine you need.  These products come in several forms, many of them available over-the-counter:  ? Nicotine patches  ? Nicotine gum and lozenges  ? Nicotine inhaler  · Ask your doctor about bupropion (Wellbutrin) or varenicline (Chantix), which are prescription medicines. They do not contain nicotine. They help you by reducing withdrawal symptoms, such as stress and anxiety. · Some people find hypnosis, acupuncture, and massage helpful for ending the smoking habit. · Eat a healthy diet and get regular exercise. Having healthy habits will help your body move past its craving for nicotine. · Be prepared to keep trying. Most people are not successful the first few times they try to quit. Do not get mad at yourself if you smoke again. Make a list of things you learned and think about when you want to try again, such as next week, next month, or next year. Where can you learn more? Go to https://Jentro Technologiespenoemyewgerardo.Hullabalu. org and sign in to your [employer] account. Enter I887 in the EVRST box to learn more about \"Stopping Smoking: Care Instructions. \"     If you do not have an account, please click on the \"Sign Up Now\" link. Current as of: February 11, 2021               Content Version: 13.0  © 2006-2021 [employer]. Care instructions adapted under license by [facility] ([facility] [place]). If you have questions about a medical condition or this instruction, always ask your healthcare professional. [place] [address] any warranty or liability for your use of this information. Patient Education        Arthritis: Care Instructions  Overview     Arthritis, also called osteoarthritis, is a breakdown of the cartilage that cushions your joints. When the cartilage wears down, your bones rub against each other. This causes pain and stiffness. Many people have some arthritis as they age. Arthritis most often affects the joints of the spine, hands, hips, knees, or feet. Arthritis never goes away completely.  But medicine and home treatment can help reduce pain and help you stay active. Follow-up care is a key part of your treatment and safety. Be sure to make and go to all appointments, and call your doctor if you are having problems. It's also a good idea to know your test results and keep a list of the medicines you take. How can you care for yourself at home? · Stay at a healthy weight. Being overweight puts extra strain on your joints. · Talk to your doctor or physical therapist about exercises that will help ease joint pain. ? Stretch. You may enjoy gentle forms of yoga to help keep your joints and muscles flexible. ? Walk instead of jog. Other types of exercise that are less stressful on the joints include riding a bike, swimming, adrienne chi, or water exercise. ? Lift weights. Strong muscles help reduce stress on your joints. Stronger thigh muscles, for example, take some of the stress off of the knees and hips. Learn the right way to lift weights so you don't make joint pain worse. · Take your medicines exactly as prescribed. Call your doctor if you think you are having a problem with your medicine. · Take pain medicines exactly as directed. ? If the doctor gave you a prescription medicine for pain, take it as prescribed. ? If you are not taking a prescription pain medicine, ask your doctor if you can take an over-the-counter medicine. · Use a cane, crutch, walker, or another device if you need help to get around. These can help rest your joints. You also can use other things to make life easier, such as a higher toilet seat and padded handles on kitchen utensils. · Do not sit in low chairs. They can make it hard to get up. · Put heat or cold on your sore joints as needed. Use whichever helps you most. You can also switch between hot and cold packs. ? Apply heat 2 or 3 times a day for 20 to 30 minutesusing a heating pad, hot shower, or hot packto relieve pain and stiffness.  But don't use heat on a swollen joint.  ? Put ice or a cold pack on your sore joint for 10 to 20 minutes at a time. Put a thin cloth between the ice and your skin. When should you call for help? Call your doctor now or seek immediate medical care if:    · You have sudden swelling, warmth, or pain in any joint.     · You have joint pain and a fever or rash.     · You have such bad pain that you cannot use a joint. Watch closely for changes in your health, and be sure to contact your doctor if:    · You have mild joint symptoms that continue even with more than 6 weeks of care at home.     · You have stomach pain or other problems with your medicine. Where can you learn more? Go to https://byUs.[employer]eng [employer]ables. org and sign in to your [employer] account. Enter B775 in the [employer] Network box to learn more about \"Arthritis: Care Instructions. \"     If you do not have an account, please click on the \"Sign Up Now\" link. Current as of: April 30, 2021               Content Version: 13.0  © 2006-2021 [employer]. Care instructions adapted under license by [facility]. If you have questions about a medical condition or this instruction, always ask your healthcare professional. [place] [address] any warranty or liability for your use of this information. Patient Education        Learning About Low-Carbohydrate Diets  What is a low-carbohydrate diet? A low-carbohydrate (or \"low-carb\") diet limits foods and drinks that have carbohydrates. This includes grains, fruits, milk and yogurt, and starchy vegetables like potatoes, beans, and corn. It also avoids foods and drinks that have added sugar. Instead, low-carb diets include foods that are high in protein and fat. Why might you follow a low-carb diet? Low-carb diets may be used for a variety of reasons, such as for weight loss. People who have diabetes may use a low-carb diet to help manage their blood sugar levels.   What should you do before you start the diet? Talk to your doctor before you try any diet. This is even more important if you have health problems like kidney disease, heart disease, or diabetes. Your doctor may suggest that you meet with a registered dietitian. A dietitian can help you make an eating plan that works for you. What foods do you eat on a low-carb diet? On a low-carb diet, you choose foods that are high in protein and fat. Examples of these are:  · Meat, poultry, and fish. · Eggs. · Nuts, such as walnuts, pecans, almonds, and peanuts. · Peanut butter and other nut butters. · Tofu. · Avocado. · Alta Olimpia. · Non-starchy vegetables like broccoli, cauliflower, green beans, mushrooms, peppers, lettuce, and spinach. · Unsweetened non-dairy milks like almond milk and coconut milk. · Cheese, cottage cheese, and cream cheese. Current as of: December 17, 2020               Content Version: 13.0  © 2006-2021 Healthwise, OnDeck. Care instructions adapted under license by [facility] ([facility]). If you have questions about a medical condition or this instruction, always ask your healthcare professional. [place] [address] any warranty or liability for your use of this information. Patient Education        Learning About Vitamin D  Why is it important to get enough vitamin D? Your body needs vitamin D to absorb calcium. Calcium keeps your bones and muscles, including your heart, healthy and strong. If your muscles don't get enough calcium, they can cramp, hurt, or feel weak. You may have long-term (chronic) muscle aches and pains. If you don't get enough vitamin D throughout life, you have an increased chance of having thin and brittle bones (osteoporosis) in your later years. Children who don't get enough vitamin D may not grow as much as others their age. They also have a chance of getting a rare disease called rickets. It causes weak bones. Vitamin D and calcium are added to many foods.  And your body uses sunshine to make its own vitamin D. How much vitamin D do you need? The recommended daily allowance (RDA) for vitamin D is 600 IU (international units) every day for people ages 3 through 79. Adults 71 and older need 800 IU every day. Blood tests for vitamin D can check your vitamin D level. But there is no standard normal range used by all laboratories. You're likely getting enough vitamin D if your levels are in the range of 20 to 50 ng/mL. How can you get more vitamin D? Foods that contain vitamin D include:  · [place], tuna, and mackerel. These are some of the best foods to eat when you need to get more vitamin D.  · Cheese, egg yolks, and beef liver. These foods have vitamin D in small amounts. · Milk, soy drinks, orange juice, yogurt, margarine, and some kinds of cereal have vitamin D added to them. Some people don't make vitamin D as well as others. They may have to take extra care in getting enough vitamin D. Things that reduce how much vitamin D your body makes include:  · Dark skin, such as many  Americans have. · Age, especially if you are older than 72. · Digestive problems, such as Crohn's or celiac disease. · Liver and kidney disease. Some people who do not get enough vitamin D may need supplements. Are there any risks from taking vitamin D?  · Too much vitamin D:  ? Can damage your kidneys. ? Can cause nausea and vomiting, constipation, and weakness. ? Raises the amount of calcium in your blood. If this happens, you can get confused or have an irregular heart rhythm. · Vitamin D may interact with other medicines. Tell your doctor about all of the medicines you take, including over-the-counter drugs, herbs, and pills. Tell your doctor about all of your current medical problems. Where can you learn more? Go to https://grace.[facility]. org and sign in to your Ule account.  Enter [id number] in the [facility] box to learn more about \"Learning About Vitamin D. \"     If you do not have an account, please click on the \"Sign Up Now\" link. Current as of: December 17, 2020               Content Version: 13.0  © 2046-2427 Healthwise, Incorporated. Care instructions adapted under license by [facility] ([facility]). If you have questions about a medical condition or this instruction, always ask your healthcare professional. [address] any warranty or liability for your use of this information.

## 2021-11-15 LAB
ORGANISM: ABNORMAL
URINE CULTURE, ROUTINE: ABNORMAL

## 2021-12-08 ENCOUNTER — HOSPITAL ENCOUNTER (OUTPATIENT)
Dept: CT IMAGING | Age: 61
Discharge: HOME | End: 2021-12-08
Payer: MEDICARE

## 2021-12-08 DIAGNOSIS — Z12.2 ENCOUNTER FOR SCREENING FOR LUNG CANCER: ICD-10-CM

## 2021-12-08 PROCEDURE — 71271 CT THORAX LUNG CANCER SCR C-: CPT

## 2022-01-04 ENCOUNTER — HOSPITAL ENCOUNTER (OUTPATIENT)
Dept: MAMMOGRAPHY | Age: 62
Discharge: HOME | End: 2022-01-09
Payer: MEDICARE

## 2022-01-04 VITALS — HEIGHT: 63 IN | BODY MASS INDEX: 44.3 KG/M2 | WEIGHT: 250 LBS

## 2022-01-04 DIAGNOSIS — Z12.31 VISIT FOR SCREENING MAMMOGRAM: ICD-10-CM

## 2022-01-04 PROCEDURE — 77067 SCR MAMMO BI INCL CAD: CPT

## 2022-01-07 ENCOUNTER — TELEPHONE (OUTPATIENT)
Dept: MAMMOGRAPHY | Age: 62
End: 2022-01-07

## 2022-01-07 NOTE — TELEPHONE ENCOUNTER
Left message regarding screening mammogram results and the radiologist recommendation for additional imaging. Left number to department and central scheduling to set up appointment.

## 2022-01-24 ENCOUNTER — HOSPITAL ENCOUNTER (OUTPATIENT)
Dept: ULTRASOUND IMAGING | Age: 62
Discharge: HOME | End: 2022-01-24
Payer: MEDICARE

## 2022-01-24 ENCOUNTER — HOSPITAL ENCOUNTER (OUTPATIENT)
Dept: MAMMOGRAPHY | Age: 62
Discharge: HOME | End: 2022-01-24
Payer: MEDICARE

## 2022-01-24 DIAGNOSIS — R92.8 ABNORMAL MAMMOGRAM: ICD-10-CM

## 2022-01-24 PROCEDURE — G0279 TOMOSYNTHESIS, MAMMO: HCPCS

## 2022-01-24 PROCEDURE — 76642 ULTRASOUND BREAST LIMITED: CPT

## 2022-02-14 ENCOUNTER — OFFICE VISIT (OUTPATIENT)
Dept: FAMILY MEDICINE CLINIC | Age: 62
End: 2022-02-14
Payer: MEDICARE

## 2022-02-14 VITALS
OXYGEN SATURATION: 97 % | TEMPERATURE: 98 F | HEART RATE: 69 BPM | WEIGHT: 257 LBS | SYSTOLIC BLOOD PRESSURE: 115 MMHG | BODY MASS INDEX: 45.53 KG/M2 | DIASTOLIC BLOOD PRESSURE: 73 MMHG

## 2022-02-14 DIAGNOSIS — R35.0 FREQUENT URINATION: Primary | ICD-10-CM

## 2022-02-14 DIAGNOSIS — N30.01 ACUTE CYSTITIS WITH HEMATURIA: ICD-10-CM

## 2022-02-14 LAB
BILIRUBIN, POC: NORMAL
BLOOD URINE, POC: NORMAL
CLARITY, POC: CLEAR
COLOR, POC: YELLOW
GLUCOSE URINE, POC: NORMAL
KETONES, POC: NORMAL
LEUKOCYTE EST, POC: NORMAL
NITRITE, POC: NORMAL
PH, POC: 5
PROTEIN, POC: NORMAL
SPECIFIC GRAVITY, POC: 1.02
UROBILINOGEN, POC: 0.2

## 2022-02-14 PROCEDURE — 99213 OFFICE O/P EST LOW 20 MIN: CPT | Performed by: NURSE PRACTITIONER

## 2022-02-14 PROCEDURE — 3017F COLORECTAL CA SCREEN DOC REV: CPT | Performed by: NURSE PRACTITIONER

## 2022-02-14 PROCEDURE — 4004F PT TOBACCO SCREEN RCVD TLK: CPT | Performed by: NURSE PRACTITIONER

## 2022-02-14 PROCEDURE — G8427 DOCREV CUR MEDS BY ELIG CLIN: HCPCS | Performed by: NURSE PRACTITIONER

## 2022-02-14 PROCEDURE — 81002 URINALYSIS NONAUTO W/O SCOPE: CPT | Performed by: NURSE PRACTITIONER

## 2022-02-14 PROCEDURE — G8482 FLU IMMUNIZE ORDER/ADMIN: HCPCS | Performed by: NURSE PRACTITIONER

## 2022-02-14 PROCEDURE — G8417 CALC BMI ABV UP PARAM F/U: HCPCS | Performed by: NURSE PRACTITIONER

## 2022-02-14 RX ORDER — NITROFURANTOIN 25; 75 MG/1; MG/1
100 CAPSULE ORAL 2 TIMES DAILY
Qty: 20 CAPSULE | Refills: 0 | Status: SHIPPED | OUTPATIENT
Start: 2022-02-14 | End: 2022-02-24

## 2022-02-14 ASSESSMENT — ENCOUNTER SYMPTOMS
GASTROINTESTINAL NEGATIVE: 1
BACK PAIN: 1
EYES NEGATIVE: 1
RESPIRATORY NEGATIVE: 1

## 2022-02-14 NOTE — PROGRESS NOTES
CHIEF COMPLAINT  Chief Complaint   Patient presents with    Back Pain    Urinary Frequency        HPI   Stacy Ornelas is a 57 y.o. female who presents to the office complaining of low back pain, urinary frequency and urgency. Patient reports that she noticed her back hurting her approximately 1 month ago. Patient reports that she did have pain with urination 2 weeks ago but symptoms resolved. Patient reports that the frequent urination has progressively worsened throughout. Patient denies any fever, chills, nausea or vomiting. Patient reports eating and drinking appropriately. No abdominal pain or cramping. No other complaints, modifying factors or associated symptoms. Nursing notes reviewed.    Past Medical History:   Diagnosis Date    Chest pain 11/10    negative myoview stress    Hematuria 10/2016    neg cysto, Dr Almita Landry    Hypertension     PONV (postoperative nausea and vomiting)     Primary osteoarthritis involving multiple joints     hands, Dr Gallagher Hinders Routine health maintenance      TAC 5/13    Sleep apnea     did not tolerate CPAP    Smoker 12/4/2018    Tarsal tunnel syndrome 2014     Past Surgical History:   Procedure Laterality Date    ARTHROPLASTY Right 10/21/2019    RIGHT THUMB CARPOMETACARPAL ARTHROPLASTY -BLOCK- -SLEEP APNEA- performed by Gurinder Madrid MD at [address] [facility] SURGERY  2004    stomach stapling    COLONOSCOPY  2011    polyps    COLONOSCOPY  01/13/2021    COLONOSCOPY N/A 1/13/2021    COLONOSCOPY WITH BIOPSY performed by F[facility]ola Aldrich MD at [facility] Left 12/9/14    plantar fasciotomy, tendon transfer, gastrocnemius recession/cortisone    HAND TENDON SURGERY Right 10/21/2019    FLEXOR CARPI RADIALIS TENDON INTERPOSITION performed by Gurinder Madrid MD at [address] [facility] ARTHROSCOPY      OTHER SURGICAL HISTORY  10/14/2016    cystoscopy, bilateral retrogrades, urethral dilation    URETHRAL STRICTURE DILATATION  7/14     Family History   Problem Relation Age of Onset    High Blood Pressure Mother     High Cholesterol Mother     Diabetes Mother     Stroke Mother     High Blood Pressure Father     High Cholesterol Father     Asthma Father     High Blood Pressure Brother     High Cholesterol Brother     Heart Disease Brother 62    Diabetes Brother      Social History     Socioeconomic History    Marital status:      Spouse name: Not on file    Number of children: Not on file    Years of education: Not on file    Highest education level: Not on file   Occupational History    Not on file   Tobacco Use    Smoking status: Current Every Day Smoker     Packs/day: 1.00     Years: 42.00     Pack years: 42.00     Types: Cigarettes    Smokeless tobacco: Former User     Quit date: 3/2/2021   Vaping Use    Vaping Use: Never used   Substance and Sexual Activity    Alcohol use: No     Alcohol/week: 0.0 standard drinks    Drug use: No    Sexual activity: Not Currently   Other Topics Concern    Not on file   Social History Narrative    Not on file     Social Determinants of Health     Financial Resource Strain:     Difficulty of Paying Living Expenses: Not on file   Food Insecurity:     Worried About Running Out of Food in the Last Year: Not on file    Naomie of Food in the Last Year: Not on file   Transportation Needs:     Lack of Transportation (Medical): Not on file    Lack of Transportation (Non-Medical):  Not on file   Physical Activity:     Days of Exercise per Week: Not on file    Minutes of Exercise per Session: Not on file   Stress:     Feeling of Stress : Not on file   Social Connections:     Frequency of Communication with Friends and Family: Not on file    Frequency of Social Gatherings with Friends and Family: Not on file    Attends Christianity Services: Not on file    Active Member of Clubs or Organizations: Not on file    Attends Club or Organization Meetings: Not on file    Marital Status: Not on file   Intimate Partner Violence:     Fear of Current or Ex-Partner: Not on file    Emotionally Abused: Not on file    Physically Abused: Not on file    Sexually Abused: Not on file   Housing Stability:     Unable to Pay for Housing in the Last Year: Not on file    Number of [place] in the Last Year: Not on file    Unstable Housing in the Last Year: Not on file     Current Outpatient Medications   Medication Sig Dispense Refill    nitrofurantoin, macrocrystal-monohydrate, (MACROBID) 100 MG capsule Take 1 capsule by mouth 2 times daily for 10 days 20 capsule 0    lisinopril-hydroCHLOROthiazide (PRINZIDE;ZESTORETIC) 10-12.5 MG per tablet TAKE 1 TABLET ONCE DAILY 90 tablet 1    dicyclomine (BENTYL) 10 MG capsule TAKE 1 OR 2 CAPSULES BY MOUTH THREE TIMES A DAY BEFORE MEALS AS NEEDED FOR DIARRHEA 540 capsule 0    oxyCODONE-acetaminophen (PERCOCET) 7.5-325 MG per tablet       Cholecalciferol (VITAMIN D3) 125 MCG (5000 UT) TABS Take by mouth      Simethicone (GAS RELIEF 125 MAX ST PO) Take by mouth      meloxicam (MOBIC) 15 MG tablet Take 15 mg by mouth daily      aspirin 81 MG tablet Take 81 mg by mouth daily      gabapentin (NEURONTIN) 300 MG capsule TAKE THREE CAPSULES BY MOUTH THREE TIMES A  capsule 1    alosetron (LOTRONEX) 0.5 MG tablet Take 1 tablet by mouth 2 times daily 60 tablet 1     No current facility-administered medications for this visit. Allergies   Allergen Reactions    Codeine     Morphine And Related     Sulfa Antibiotics Other (See Comments)     Caused elevation of WBC  elevated WBC    Itraconazole      Swelling of feet       REVIEW OF SYSTEMS  Review of Systems   Constitutional: Negative. HENT: Negative. Eyes: Negative. Respiratory: Negative. Gastrointestinal: Negative. Genitourinary: Positive for dysuria, frequency and urgency. Musculoskeletal: Positive for back pain. Skin: Negative. Neurological: Negative. Psychiatric/Behavioral: Negative. PHYSICAL EXAM  /73   Pulse 69   Temp 98 °F (36.7 °C) (Oral)   Wt 257 lb (116.6 kg)   SpO2 97%   BMI 45.53 kg/m²   Physical Exam  Constitutional:       Appearance: Normal appearance. She is not toxic-appearing. HENT:      Head: Normocephalic. Right Ear: External ear normal.      Left Ear: External ear normal.      Nose: Nose normal.      Mouth/Throat:      Mouth: Mucous membranes are moist.      Pharynx: Oropharynx is clear. Eyes:      Extraocular Movements: Extraocular movements intact. Conjunctiva/sclera: Conjunctivae normal.      Pupils: Pupils are equal, round, and reactive to light. Cardiovascular:      Rate and Rhythm: Normal rate. Pulses: Normal pulses. Pulmonary:      Effort: Pulmonary effort is normal.      Breath sounds: Normal breath sounds. Abdominal:      General: Bowel sounds are normal.      Palpations: Abdomen is soft. Tenderness: There is no abdominal tenderness. There is no right CVA tenderness, left CVA tenderness, guarding or rebound. Hernia: No hernia is present. Musculoskeletal:         General: Normal range of motion. Cervical back: Normal range of motion. Skin:     General: Skin is warm and dry. Capillary Refill: Capillary refill takes less than 2 seconds. Findings: No rash. Neurological:      Mental Status: She is alert and oriented to person, place, and time. ASSESSMENT/PLAN:   1. Acute cystitis with hematuria/ Frequent urination  Patient presents today with low back pain x1 month. Patient reports that she did have pain with urination of 2 weeks ago but symptoms resolved. Patient reports that throughout the course of 1 month she has had urgency and frequency progressively worsening. Patient denies any abdominal pain or discomfort. No nausea, vomiting, diarrhea, fever or chills. Patient reports eating and drinking appropriately.   Urinalysis obtained which did reveal small amount of leukocytes and blood. Patient was placed on Macrobid at this time with strict return precautions. Urine culture sent and pending. Patient verbalized and acknowledges the plan of care at this time. - POCT Urinalysis no Micro  - Culture, Urine  - nitrofurantoin, macrocrystal-monohydrate, (MACROBID) 100 MG capsule; Take 1 capsule by mouth 2 times daily for 10 days  Dispense: 20 capsule; Refill: 0         The note was completed using Dragon voice recognition transcription. Every effort was made to ensure accuracy; however, inadvertent  transcription errors may be present despite my best efforts to edit errors.     Magno Sosa, RUBEN - CNP

## 2022-02-14 NOTE — PATIENT INSTRUCTIONS
Please read the healthy family handout that you were given and share it with your family. Please compare this printed medication list with your medications at home to be sure they are the same. If you have any medications that are different please contact us immediately at [phone]. Also review your allergies that we have listed, these may also include medications that you have not been able to tolerate, make sure everything listed is correct. If you have any allergies that are different please contact us immediately at [phone].

## 2022-02-15 LAB — URINE CULTURE, ROUTINE: NORMAL

## 2022-03-08 ENCOUNTER — OFFICE VISIT (OUTPATIENT)
Dept: FAMILY MEDICINE CLINIC | Age: 62
End: 2022-03-08
Payer: MEDICARE

## 2022-03-08 VITALS
BODY MASS INDEX: 46.12 KG/M2 | WEIGHT: 260.38 LBS | SYSTOLIC BLOOD PRESSURE: 105 MMHG | TEMPERATURE: 99 F | DIASTOLIC BLOOD PRESSURE: 73 MMHG | OXYGEN SATURATION: 99 % | HEART RATE: 71 BPM

## 2022-03-08 DIAGNOSIS — M54.50 LUMBAR PAIN: Primary | ICD-10-CM

## 2022-03-08 DIAGNOSIS — I10 BENIGN ESSENTIAL HYPERTENSION: ICD-10-CM

## 2022-03-08 PROCEDURE — G8427 DOCREV CUR MEDS BY ELIG CLIN: HCPCS | Performed by: NURSE PRACTITIONER

## 2022-03-08 PROCEDURE — G8482 FLU IMMUNIZE ORDER/ADMIN: HCPCS | Performed by: NURSE PRACTITIONER

## 2022-03-08 PROCEDURE — G8417 CALC BMI ABV UP PARAM F/U: HCPCS | Performed by: NURSE PRACTITIONER

## 2022-03-08 PROCEDURE — 3017F COLORECTAL CA SCREEN DOC REV: CPT | Performed by: NURSE PRACTITIONER

## 2022-03-08 PROCEDURE — 99213 OFFICE O/P EST LOW 20 MIN: CPT | Performed by: NURSE PRACTITIONER

## 2022-03-08 PROCEDURE — 4004F PT TOBACCO SCREEN RCVD TLK: CPT | Performed by: NURSE PRACTITIONER

## 2022-03-08 RX ORDER — PREDNISONE 20 MG/1
60 TABLET ORAL DAILY
Qty: 15 TABLET | Refills: 0 | Status: SHIPPED | OUTPATIENT
Start: 2022-03-08 | End: 2022-03-13

## 2022-03-08 RX ORDER — LIDOCAINE 50 MG/G
1 PATCH TOPICAL DAILY
Qty: 30 PATCH | Refills: 0 | Status: SHIPPED | OUTPATIENT
Start: 2022-03-08

## 2022-03-08 RX ORDER — LISINOPRIL AND HYDROCHLOROTHIAZIDE 12.5; 1 MG/1; MG/1
TABLET ORAL
Qty: 90 TABLET | Refills: 1 | Status: SHIPPED | OUTPATIENT
Start: 2022-03-08

## 2022-03-08 RX ORDER — METHOCARBAMOL 500 MG/1
500 TABLET, FILM COATED ORAL 4 TIMES DAILY
Qty: 40 TABLET | Refills: 0 | Status: SHIPPED | OUTPATIENT
Start: 2022-03-08 | End: 2022-03-18

## 2022-03-08 ASSESSMENT — ENCOUNTER SYMPTOMS
EYES NEGATIVE: 1
RESPIRATORY NEGATIVE: 1
BACK PAIN: 1
GASTROINTESTINAL NEGATIVE: 1

## 2022-03-08 NOTE — PROGRESS NOTES
CHIEF COMPLAINT  Chief Complaint   Patient presents with    Back Pain        HPI   Cherie Epstein is a 57 y.o. female who presents to the office complaining of low back pain over the past month. She denies any acute injury, trauma or fall. Patient reports that symptoms have been ongoing denies any numbness or tingling in extremities. No loss of bowel or bladder function. No nausea, vomiting, diarrhea. No fever or chills. Patient denies any dysuria, hematuria, urinary urgency, frequency or retention. Patient reports pain is worse with standing and walking. Patient reports use of Lidoderm patches and Aleve. No other complaints, modifying factors or associated symptoms. Nursing notes reviewed.    Past Medical History:   Diagnosis Date    Chest pain 11/10    negative myoview stress    Hematuria 10/2016    neg cysto, Dr Montero Pi    Hypertension     PONV (postoperative nausea and vomiting)     Primary osteoarthritis involving multiple joints     hands, Dr Mandeep Duong Routine health maintenance      TAC 5/13    Sleep apnea     did not tolerate CPAP    Smoker 12/4/2018    Tarsal tunnel syndrome 2014     Past Surgical History:   Procedure Laterality Date    ARTHROPLASTY Right 10/21/2019    RIGHT THUMB CARPOMETACARPAL ARTHROPLASTY -BLOCK- -SLEEP APNEA- performed by Melissa Isabel MD at [address] [place] [address] SURGERY  2004    stomach stapling    COLONOSCOPY  2011    polyps    COLONOSCOPY  01/13/2021    COLONOSCOPY N/A 1/13/2021    COLONOSCOPY WITH BIOPSY performed by [facility]elita [facility] MD at [facility] Left 12/9/14    plantar fasciotomy, tendon transfer, gastrocnemius recession/cortisone    HAND TENDON SURGERY Right 10/21/2019    FLEXOR CARPI RADIALIS TENDON INTERPOSITION performed by Melissa Isabel MD at [address] [place] [address] ARTHROSCOPY      OTHER SURGICAL HISTORY  10/14/2016    cystoscopy, bilateral retrogrades, urethral dilation  URETHRAL STRICTURE DILATATION  7/14     Family History   Problem Relation Age of Onset    High Blood Pressure Mother     High Cholesterol Mother     Diabetes Mother     Stroke Mother     High Blood Pressure Father     High Cholesterol Father     Asthma Father     High Blood Pressure Brother     High Cholesterol Brother     Heart Disease Brother 62    Diabetes Brother      Social History     Socioeconomic History    Marital status:      Spouse name: Not on file    Number of children: Not on file    Years of education: Not on file    Highest education level: Not on file   Occupational History    Not on file   Tobacco Use    Smoking status: Current Every Day Smoker     Packs/day: 1.00     Years: 42.00     Pack years: 42.00     Types: Cigarettes    Smokeless tobacco: Former User     Quit date: 3/2/2021   Vaping Use    Vaping Use: Never used   Substance and Sexual Activity    Alcohol use: No     Alcohol/week: 0.0 standard drinks    Drug use: No    Sexual activity: Not Currently   Other Topics Concern    Not on file   Social History Narrative    Not on file     Social Determinants of Health     Financial Resource Strain:     Difficulty of Paying Living Expenses: Not on file   Food Insecurity:     Worried About Running Out of Food in the Last Year: Not on file    Naomie of Food in the Last Year: Not on file   Transportation Needs:     Lack of Transportation (Medical): Not on file    Lack of Transportation (Non-Medical):  Not on file   Physical Activity:     Days of Exercise per Week: Not on file    Minutes of Exercise per Session: Not on file   Stress:     Feeling of Stress : Not on file   Social Connections:     Frequency of Communication with Friends and Family: Not on file    Frequency of Social Gatherings with Friends and Family: Not on file    Attends Temple Services: Not on file    Active Member of Clubs or Organizations: Not on file    Attends Club or Organization Meetings: Not on file    Marital Status: Not on file   Intimate Partner Violence:     Fear of Current or Ex-Partner: Not on file    Emotionally Abused: Not on file    Physically Abused: Not on file    Sexually Abused: Not on file   Housing Stability:     Unable to Pay for Housing in the Last Year: Not on file    Number of [place] in the Last Year: Not on file    Unstable Housing in the Last Year: Not on file     Current Outpatient Medications   Medication Sig Dispense Refill    lisinopril-hydroCHLOROthiazide (PRINZIDE;ZESTORETIC) 10-12.5 MG per tablet TAKE 1 TABLET ONCE DAILY 90 tablet 1    lidocaine (LIDODERM) 5 % Place 1 patch onto the skin daily 12 hours on, 12 hours off. 30 patch 0    methocarbamol (ROBAXIN) 500 MG tablet Take 1 tablet by mouth 4 times daily for 10 days 40 tablet 0    predniSONE (DELTASONE) 20 MG tablet Take 3 tablets by mouth daily for 5 days 15 tablet 0    dicyclomine (BENTYL) 10 MG capsule TAKE 1 OR 2 CAPSULES BY MOUTH THREE TIMES A DAY BEFORE MEALS AS NEEDED FOR DIARRHEA 540 capsule 0    oxyCODONE-acetaminophen (PERCOCET) 7.5-325 MG per tablet       Cholecalciferol (VITAMIN D3) 125 MCG (5000 UT) TABS Take by mouth      Simethicone (GAS RELIEF 125 MAX ST PO) Take by mouth      meloxicam (MOBIC) 15 MG tablet Take 15 mg by mouth daily      aspirin 81 MG tablet Take 81 mg by mouth daily      gabapentin (NEURONTIN) 300 MG capsule TAKE THREE CAPSULES BY MOUTH THREE TIMES A  capsule 1    alosetron (LOTRONEX) 0.5 MG tablet Take 1 tablet by mouth 2 times daily 60 tablet 1     No current facility-administered medications for this visit. Allergies   Allergen Reactions    Codeine     Morphine And Related     Sulfa Antibiotics Other (See Comments)     Caused elevation of WBC  elevated WBC    Itraconazole      Swelling of feet       REVIEW OF SYSTEMS  Review of Systems   Constitutional: Negative. HENT: Negative. Eyes: Negative. Respiratory: Negative. Cardiovascular: Negative. Gastrointestinal: Negative. Genitourinary: Negative. Musculoskeletal: Positive for arthralgias and back pain. Skin: Negative. Neurological: Negative. Psychiatric/Behavioral: Negative. PHYSICAL EXAM  /73   Pulse 71   Temp 99 °F (37.2 °C) (Oral)   Wt 260 lb 6 oz (118.1 kg)   SpO2 99%   BMI 46.12 kg/m²   Physical Exam  Constitutional:       Appearance: Normal appearance. HENT:      Head: Normocephalic and atraumatic. Right Ear: External ear normal.      Left Ear: External ear normal.      Nose: Nose normal.      Mouth/Throat:      Mouth: Mucous membranes are moist.      Pharynx: Oropharynx is clear. Eyes:      Extraocular Movements: Extraocular movements intact. Conjunctiva/sclera: Conjunctivae normal.   Cardiovascular:      Rate and Rhythm: Normal rate. Pulses: Normal pulses. Pulmonary:      Effort: Pulmonary effort is normal.      Breath sounds: Normal breath sounds. Abdominal:      General: Bowel sounds are normal.      Palpations: Abdomen is soft. Tenderness: There is no right CVA tenderness, left CVA tenderness or guarding. Musculoskeletal:         General: Normal range of motion. Cervical back: Normal and normal range of motion. Thoracic back: Normal.      Lumbar back: Bony tenderness present. No spasms or tenderness. Normal range of motion. Negative right straight leg raise test and negative left straight leg raise test.        Back:    Skin:     General: Skin is warm and dry. Capillary Refill: Capillary refill takes less than 2 seconds. Findings: No rash. Neurological:      Mental Status: She is alert and oriented to person, place, and time. ASSESSMENT/PLAN:   1. Lumbar pain  Patient presents with complaints of lumbar pain over the past month. No saddle paresthesias or loss of bowel or bladder function. No acute injury, trauma or fall.   Patient reports that symptoms are worse upon standing and walking. Patient reports trying Aleve, Lidoderm patches and heating pad for symptomatic relief. Patient has history of degenerative disc disease. I did recommend that we try a course of steroids, anti-inflammatories, muscle relaxants and Lidoderm patches at this time with strict follow-up with spine Ortho. Patient given stretching exercises to perform at home as well. Patient is ambulatory gait observed to be steady. Continue with current management following up for any new or worsening symptoms. Patient verbalized acknowledges plan of care at this time. - Nura Tran MD, Orthopedic Surgery, [facility]  - lidocaine (LIDODERM) 5 %; Place 1 patch onto the skin daily 12 hours on, 12 hours off. Dispense: 30 patch; Refill: 0  - methocarbamol (ROBAXIN) 500 MG tablet; Take 1 tablet by mouth 4 times daily for 10 days  Dispense: 40 tablet; Refill: 0  - predniSONE (DELTASONE) 20 MG tablet; Take 3 tablets by mouth daily for 5 days  Dispense: 15 tablet; Refill: 0    2. Benign essential hypertension  Stable. Patient requesting refill on medication. No recent episodes of dizziness, lightheadedness, chest pain. Patient compliant with lisinopril-hydrochlorothiazide continue with current treatment management follow-up in 6 months, sooner for new or worsening symptoms. - lisinopril-hydroCHLOROthiazide (PRINZIDE;ZESTORETIC) 10-12.5 MG per tablet; TAKE 1 TABLET ONCE DAILY  Dispense: 90 tablet; Refill: 1         The note was completed using Dragon voice recognition transcription. Every effort was made to ensure accuracy; however, inadvertent  transcription errors may be present despite my best efforts to edit errors.     Magno Sosa, RUBEN - CNP

## 2022-03-15 ENCOUNTER — CLINICAL DOCUMENTATION (OUTPATIENT)
Dept: OTHER | Age: 62
End: 2022-03-15

## 2022-03-24 ENCOUNTER — OFFICE VISIT (OUTPATIENT)
Dept: ORTHOPEDIC SURGERY | Age: 62
End: 2022-03-24
Payer: MEDICARE

## 2022-03-24 VITALS — BODY MASS INDEX: 46.07 KG/M2 | HEIGHT: 63 IN | WEIGHT: 260 LBS

## 2022-03-24 DIAGNOSIS — M54.50 LUMBAR PAIN: Primary | ICD-10-CM

## 2022-03-24 DIAGNOSIS — M48.061 SPINAL STENOSIS OF LUMBAR REGION WITHOUT NEUROGENIC CLAUDICATION: ICD-10-CM

## 2022-03-24 PROCEDURE — 99214 OFFICE O/P EST MOD 30 MIN: CPT | Performed by: PHYSICIAN ASSISTANT

## 2022-03-24 PROCEDURE — 3017F COLORECTAL CA SCREEN DOC REV: CPT | Performed by: PHYSICIAN ASSISTANT

## 2022-03-24 PROCEDURE — G8417 CALC BMI ABV UP PARAM F/U: HCPCS | Performed by: PHYSICIAN ASSISTANT

## 2022-03-24 PROCEDURE — G8427 DOCREV CUR MEDS BY ELIG CLIN: HCPCS | Performed by: PHYSICIAN ASSISTANT

## 2022-03-24 PROCEDURE — MISCD282 ADJUSTA LIFT: Performed by: ORTHOPAEDIC SURGERY

## 2022-03-24 PROCEDURE — G8482 FLU IMMUNIZE ORDER/ADMIN: HCPCS | Performed by: PHYSICIAN ASSISTANT

## 2022-03-24 PROCEDURE — 4004F PT TOBACCO SCREEN RCVD TLK: CPT | Performed by: PHYSICIAN ASSISTANT

## 2022-03-24 NOTE — PROGRESS NOTES
New Patient: LUMBAR SPINE    Referring Provider:  RUBEN Quintero*    CHIEF COMPLAINT:    Chief Complaint   Patient presents with    Back Pain     Patient states that her back pain started in Dec 2022. Patient has all low back pain. Patient is currently in PM and in past had RFA with good relief. HISTORY OF PRESENT ILLNESS:     Ms. Reed Mcelroy is a pleasant 57 y.o. female here for consultation regarding her LBP. She states her pain began insidiously about 4 months ago. Her pain has steadily persisted since then. She rates her back pain 8/10 and leg pain 0/10. She describes the pain as aching. Pain is worse with standing and walking and improved some with sitting or lying down. Pain is localized to her lumbar spine waste line and does not radiate. She denies lower extremity pain, numbness or weakness. She denies saddle numbness or bowel or bladder dysfunction. The pain minimally disrupts her sleep. She is in pain management with Dr. Isamar Stringer. She notes significant relief with radiofrequency ablation 4 years ago on her back.     Current/Past Treatment:   · Physical Therapy: No  · Chiropractic:  No   · Injection: RFA several years ago with relief   · Medications:  Lidoderm patch, Percocet 7.5/325, Robaxin, Prednisone     Past Medical History:   Past Medical History:   Diagnosis Date    Chest pain 11/10    negative myoview stress    Hematuria 10/2016    neg cysto, Dr Howell Serve    Hypertension     PONV (postoperative nausea and vomiting)     Primary osteoarthritis involving multiple joints     hands, Dr De Leon [place] Routine health maintenance      TAC 5/13    Sleep apnea     did not tolerate CPAP    Smoker 12/4/2018    Tarsal tunnel syndrome 2014        Past Surgical History:     Past Surgical History:   Procedure Laterality Date    ARTHROPLASTY Right 10/21/2019    RIGHT THUMB CARPOMETACARPAL ARTHROPLASTY -BLOCK- -SLEEP APNEA- performed by Dee Braden MD at [address] [facility] SURGERY  2004    stomach stapling    COLONOSCOPY  2011    polyps    COLONOSCOPY  01/13/2021    COLONOSCOPY N/A 1/13/2021    COLONOSCOPY WITH BIOPSY performed by Mu Durant MD at [facility] Left 12/9/14    plantar fasciotomy, tendon transfer, gastrocnemius recession/cortisone    HAND TENDON SURGERY Right 10/21/2019    FLEXOR CARPI RADIALIS TENDON INTERPOSITION performed by Madelyn Lin MD at [address] [place] [address] ARTHROSCOPY      OTHER SURGICAL HISTORY  10/14/2016    cystoscopy, bilateral retrogrades, urethral dilation    URETHRAL STRICTURE DILATATION  7/14       Current Medications:     Current Outpatient Medications:     lisinopril-hydroCHLOROthiazide (PRINZIDE;ZESTORETIC) 10-12.5 MG per tablet, TAKE 1 TABLET ONCE DAILY, Disp: 90 tablet, Rfl: 1    lidocaine (LIDODERM) 5 %, Place 1 patch onto the skin daily 12 hours on, 12 hours off., Disp: 30 patch, Rfl: 0    alosetron (LOTRONEX) 0.5 MG tablet, Take 1 tablet by mouth 2 times daily, Disp: 60 tablet, Rfl: 1    dicyclomine (BENTYL) 10 MG capsule, TAKE 1 OR 2 CAPSULES BY MOUTH THREE TIMES A DAY BEFORE MEALS AS NEEDED FOR DIARRHEA, Disp: 540 capsule, Rfl: 0    oxyCODONE-acetaminophen (PERCOCET) 7.5-325 MG per tablet, , Disp: , Rfl:     Cholecalciferol (VITAMIN D3) 125 MCG (5000 UT) TABS, Take by mouth, Disp: , Rfl:     Simethicone (GAS RELIEF 125 MAX ST PO), Take by mouth, Disp: , Rfl:     meloxicam (MOBIC) 15 MG tablet, Take 15 mg by mouth daily, Disp: , Rfl:     aspirin 81 MG tablet, Take 81 mg by mouth daily, Disp: , Rfl:     gabapentin (NEURONTIN) 300 MG capsule, TAKE THREE CAPSULES BY MOUTH THREE TIMES A DAY, Disp: 540 capsule, Rfl: 1    Allergies:  Codeine, Morphine and related, Sulfa antibiotics, and Itraconazole    Social History:    reports that she has been smoking cigarettes. She has a 42.00 pack-year smoking history. She quit smokeless tobacco use about 12 months ago.  She reports that she does not drink alcohol and does not use drugs. Family History:   Family History   Problem Relation Age of Onset    High Blood Pressure Mother     High Cholesterol Mother     Diabetes Mother     Stroke Mother     High Blood Pressure Father     High Cholesterol Father     Asthma Father     High Blood Pressure Brother     High Cholesterol Brother     Heart Disease Brother 62    Diabetes Brother        REVIEW OF SYSTEMS: Full ROS noted & scanned   CONSTITUTIONAL: Denies unexplained weight loss, fevers, chills or fatigue  NEUROLOGICAL: Denies unsteady gait or progressive weakness  MUSCULOSKELETAL: Denies joint swelling or redness  PSYCHOLOGICAL: Denies anxiety, depression   SKIN: Denies skin changes, delayed healing, rash, itching   HEMATOLOGIC: Denies easy bleeding or bruising  ENDOCRINE: Denies excessive thirst, urination, heat/cold  RESPIRATORY: Denies current dyspnea, cough  GI: Denies nausea, vomiting, diarrhea   : Denies bowel or bladder issues      PHYSICAL EXAM:    Vitals: Height 5' 3\" (1.6 m), weight 260 lb (117.9 kg), not currently breastfeeding. GENERAL EXAM:  · General Apparence: Patient is adequately groomed with no evidence of malnutrition. · Orientation: The patient is oriented to time, place and person. · Mood & Affect:The patient's mood and affect are appropriate. · Vascular: Examination reveals no swelling tenderness in upper or lower extremities. Good capillary refill. · Lymphatic: The lymphatic examination bilaterally reveals all areas to be without enlargement or induration  · Sensation: Sensation is intact without deficit  · Coordination/Balance: Good coordination. Tandem walking normal.     LUMBAR/SACRAL EXAMINATION:  · Inspection: Local inspection shows no step-off or bruising. Lumbar alignment is normal.  Sagittal and Coronal balance is neutral.      · Palpation:   No evidence of tenderness at the midline.   No tenderness bilaterally at the paraspinal or trochanters. There is no step-off or paraspinal spasm. · Range of Motion: Lumbar flexion, extension and rotation are mildly limited due to pain. · Strength:   Strength testing is 5/5 in all muscle groups tested. · Special Tests:   Straight leg raise and crossed SLR negative. Leg length and pelvis level. · Skin: There are no rashes, ulcerations or lesions. · Reflexes: Reflexes are symmetrically 2+ at the patellar and ankle tendons. Clonus absent bilaterally at the feet. · Gait & station: normal, patient ambulates without assistance    · Additional Examinations:   · RIGHT LOWER EXTREMITY: Inspection/examination of the right lower extremity does not show any tenderness, deformity or injury. Range of motion is unremarkable. There is no gross instability. There are no rashes, ulcerations or lesions. Strength and tone are normal.  · LEFT LOWER EXTREMITY:  Inspection/examination of the left lower extremity does not show any tenderness, deformity or injury. Range of motion is unremarkable. There is no gross instability. There are no rashes, ulcerations or lesions. Strength and tone are normal.    Diagnostic Testing:    Ap and lateral xray images of her lumbar spine were obtained in the office today and independently reviewed. They show mild to multilevel spondylosis and facet arthropathy, with grade 1 spondylolisthesis L4-5. No acute fracture noted. Impression:   Lumbar DDD and facet arthropathy    Plan:    We discussed treatment options including observation, additional oral steroids, physical therapy, epidural injections and additional imaging. She wishes to proceed with physical therapy, home exercise program, and a left shoe lift. She will call for a lumbar MRI if symptoms persist after that. Patient examined and note dictated by Neo Vigil PA-C. Patient also seen and examined by Dr. Armond Lomeli.

## 2022-04-26 ENCOUNTER — TELEPHONE (OUTPATIENT)
Dept: ORTHOPEDIC SURGERY | Age: 62
End: 2022-04-26

## 2022-04-26 NOTE — TELEPHONE ENCOUNTER
Attempted to contact patient to inform them about the [facility] [id number] joint pain program.    To provide optimal care, [facility] [address], in collaboration with our Primary Care Providers, are excited to update our [facility] ([facility]) patients on our joint pain program.    In the past have you received injections in your knees? Or are you currently experiencing knee pain that is impacting your daily activities or quality of life? For more information about what [facility] [address] can offer to you or to set up an appointment with a joint pain specialist, please call us back at [phone].

## 2022-05-12 NOTE — PROGRESS NOTES
CHIEF COMPLAINT  Chief Complaint   Patient presents with   Mary Hagen     HTN        HPI   Munira Redmond is a 57 y.o. female who presents to the office for checkup. Patient reports doing well. No recent changes in medications or medical illness. No new unusual fatigue or unexplained weight loss. No chest pain or shortness of breath. No abdominal pain or discomfort. No nausea, vomiting, diarrhea. No dark or tarry stools. She reports continued urinary urgency/frequency at night. Patient denies any dysuria or known hematuria. Patient reports seasonal allergies as well but are improving with over-the-counter medications. She continues to smoke daily. No other complaints, modifying factors or associated symptoms. Nursing notes reviewed.    Past Medical History:   Diagnosis Date    Chest pain 11/10    negative myoview stress    Hematuria 10/2016    neg cysto, Dr Miracle Lui    Hypertension     PONV (postoperative nausea and vomiting)     Primary osteoarthritis involving multiple joints     hands, Dr Tess Del Valle Routine health maintenance      TAC 5/13    Sleep apnea     did not tolerate CPAP    Smoker 12/4/2018    Tarsal tunnel syndrome 2014     Past Surgical History:   Procedure Laterality Date    ARTHROPLASTY Right 10/21/2019    RIGHT THUMB CARPOMETACARPAL ARTHROPLASTY -BLOCK- -SLEEP APNEA- performed by Martin Horne MD at [address] [place] [address] SURGERY  2004    stomach stapling    COLONOSCOPY  2011    polyps    COLONOSCOPY  01/13/2021    COLONOSCOPY N/A 1/13/2021    COLONOSCOPY WITH BIOPSY performed by Terra Arroyo MD at [facility] Left 12/9/14    plantar fasciotomy, tendon transfer, gastrocnemius recession/cortisone    HAND TENDON SURGERY Right 10/21/2019    FLEXOR CARPI RADIALIS TENDON INTERPOSITION performed by Martin Horne MD at [facility] ARTHROSCOPY      OTHER SURGICAL HISTORY  10/14/2016 cystoscopy, bilateral retrogrades, urethral dilation    URETHRAL STRICTURE DILATATION  7/14     Family History   Problem Relation Age of Onset    High Blood Pressure Mother     High Cholesterol Mother     Diabetes Mother     Stroke Mother     High Blood Pressure Father     High Cholesterol Father     Asthma Father     High Blood Pressure Brother     High Cholesterol Brother     Heart Disease Brother 62    Diabetes Brother      Social History     Socioeconomic History    Marital status:      Spouse name: Not on file    Number of children: Not on file    Years of education: Not on file    Highest education level: Not on file   Occupational History    Not on file   Tobacco Use    Smoking status: Current Every Day Smoker     Packs/day: 1.00     Years: 42.00     Pack years: 42.00     Types: Cigarettes    Smokeless tobacco: Former User     Quit date: 3/2/2021   Vaping Use    Vaping Use: Never used   Substance and Sexual Activity    Alcohol use: No     Alcohol/week: 0.0 standard drinks    Drug use: No    Sexual activity: Not Currently   Other Topics Concern    Not on file   Social History Narrative    Not on file     Social Determinants of Health     Financial Resource Strain:     Difficulty of Paying Living Expenses: Not on file   Food Insecurity:     Worried About Running Out of Food in the Last Year: Not on file    Naomie of Food in the Last Year: Not on file   Transportation Needs:     Lack of Transportation (Medical): Not on file    Lack of Transportation (Non-Medical):  Not on file   Physical Activity:     Days of Exercise per Week: Not on file    Minutes of Exercise per Session: Not on file   Stress:     Feeling of Stress : Not on file   Social Connections:     Frequency of Communication with Friends and Family: Not on file    Frequency of Social Gatherings with Friends and Family: Not on file    Attends Yazidi Services: Not on file   [employer]s or Organizations: Not on file    Attends Club or Organization Meetings: Not on file    Marital Status: Not on file   Intimate Partner Violence:     Fear of Current or Ex-Partner: Not on file    Emotionally Abused: Not on file    Physically Abused: Not on file    Sexually Abused: Not on file   Housing Stability:     Unable to Pay for Housing in the Last Year: Not on file    Number of Levi in the Last Year: Not on file    Unstable Housing in the Last Year: Not on file     Current Outpatient Medications   Medication Sig Dispense Refill    POTASSIUM PO Take by mouth OTC      oxybutynin (DITROPAN XL) 5 MG extended release tablet Take 1 tablet by mouth daily 30 tablet 3    lisinopril-hydroCHLOROthiazide (PRINZIDE;ZESTORETIC) 10-12.5 MG per tablet TAKE 1 TABLET ONCE DAILY 90 tablet 1    lidocaine (LIDODERM) 5 % Place 1 patch onto the skin daily 12 hours on, 12 hours off. 30 patch 0    oxyCODONE-acetaminophen (PERCOCET) 7.5-325 MG per tablet       Cholecalciferol (VITAMIN D3) 125 MCG (5000 UT) TABS Take by mouth      Simethicone (GAS RELIEF 125 MAX ST PO) Take by mouth      meloxicam (MOBIC) 15 MG tablet Take 15 mg by mouth daily      aspirin 81 MG tablet Take 81 mg by mouth daily      gabapentin (NEURONTIN) 300 MG capsule TAKE THREE CAPSULES BY MOUTH THREE TIMES A  capsule 1    alosetron (LOTRONEX) 0.5 MG tablet Take 1 tablet by mouth 2 times daily 60 tablet 1     No current facility-administered medications for this visit. Allergies   Allergen Reactions    Codeine     Morphine And Related     Sulfa Antibiotics Other (See Comments)     Caused elevation of WBC  elevated WBC    Itraconazole      Swelling of feet       REVIEW OF SYSTEMS  Review of Systems   Constitutional: Negative for activity change, appetite change, chills and fever. HENT: Positive for sneezing. Negative for congestion, ear pain, rhinorrhea and sore throat. Eyes: Positive for discharge.  Negative for visual disturbance. Respiratory: Positive for cough. Negative for shortness of breath and wheezing. Cardiovascular: Negative for chest pain and leg swelling. Gastrointestinal: Negative for abdominal pain, diarrhea, nausea and vomiting. Genitourinary: Negative for dysuria, frequency, hematuria and urgency. Urinary frequency at night   Musculoskeletal: Positive for arthralgias. Negative for gait problem and myalgias. Skin: Negative for rash. Neurological: Negative for dizziness, weakness, light-headedness, numbness and headaches. Psychiatric/Behavioral: Negative for self-injury and sleep disturbance. The patient is not nervous/anxious. PHYSICAL EXAM  /79   Pulse 74   Temp 98.1 °F (36.7 °C) (Oral)   Wt 256 lb (116.1 kg)   SpO2 97%   BMI 45.35 kg/m²   Physical Exam  Vitals reviewed. Constitutional:       General: She is not in acute distress. Appearance: Normal appearance. She is well-developed. She is not diaphoretic. HENT:      Head: Normocephalic and atraumatic. Right Ear: External ear normal.      Left Ear: External ear normal.      Nose: Nose normal.      Mouth/Throat:      Mouth: Mucous membranes are moist.   Eyes:      General:         Right eye: No discharge. Left eye: No discharge. Pupils: Pupils are equal, round, and reactive to light. Neck:      Vascular: No JVD. Cardiovascular:      Rate and Rhythm: Normal rate and regular rhythm. Pulses: Normal pulses. Pulmonary:      Effort: Pulmonary effort is normal. No respiratory distress. Breath sounds: No stridor. No wheezing, rhonchi or rales. Chest:      Chest wall: No tenderness. Abdominal:      General: Bowel sounds are normal.      Palpations: Abdomen is soft. Musculoskeletal:         General: No swelling or deformity. Normal range of motion. Cervical back: Normal range of motion and neck supple. Skin:     General: Skin is warm and dry.       Capillary Refill: Capillary refill takes less than 2 seconds. Coloration: Skin is not pale. Findings: No bruising, lesion or rash. Neurological:      General: No focal deficit present. Mental Status: She is alert and oriented to person, place, and time. Psychiatric:         Mood and Affect: Mood normal.         Behavior: Behavior normal.       ASSESSMENT/PLAN:   1. Benign essential hypertension  Stable. Patient compliant with lisinopril-hydrochlorothiazide daily. Patient courage to monitor dietary intake, regular exercise and weight loss. Continue with current treatment management follow-up in 6 months, sooner for new or worsening symptoms. 2. Irritable bowel syndrome with diarrhea  Stable. Patient compliant with gas relief. No new or worsening symptoms. Continue current treatment management follow-up in 6 to 12 months, sooner for new or worsening symptoms. 3. Migraine with aura and without status migrainosus, not intractable  . Currently asymptomatic. Continue with current treatment management follow-up in 6 months, sooner for new or worsening symptoms. 4. SPENCER (obstructive sleep apnea)  Stable. Patient denies use of CPAP. Patient reports sleeping well throughout the night other than getting up to use the restroom. Continue with current treatment management follow-up in 1 year, sooner for new or worsening symptoms.       5. Pulmonary nodule  Stable. Patient continues to smoke daily. Patient due for CT lung screening. Orders placed patient encouraged to call and schedule an appointment. We will follow-up with results. 6. Pure hypercholesterolemia  Stable. Patient reports that she has been more compliant with monitoring what she is eating lately to help with her 's weight loss. Patient encouraged on regular exercise, healthy lifestyles, follow-up in 6 months, sooner for new or worsening symptoms. 7. Vitamin D deficiency  Stable. .Patient compliant with vitamin D supplement.   Continue with current treatment management follow-up in 6 months, sooner for new or worsening symptoms. 8. Nocturia  Stable. Patient reports intermittent episodes. Patient reports getting up at least 2-3 times at night. Patient previously treated for UTI and reports that those symptoms did improve however urinary frequency urgency at night has not. We did discuss trialing oxybutynin at this time with strict return precautions. Patient verbalized acknowledges with plan of care at this time. - oxybutynin (DITROPAN XL) 5 MG extended release tablet; Take 1 tablet by mouth daily  Dispense: 30 tablet; Refill: 3    9. Urinary frequency  See above #8  - oxybutynin (DITROPAN XL) 5 MG extended release tablet; Take 1 tablet by mouth daily  Dispense: 30 tablet; Refill: 3     10. Seasonal allergic rhnitis    11. Personal hx of tobacco use  See above #5  The note was completed using Dragon voice recognition transcription. Every effort was made to ensure accuracy; however, inadvertent  transcription errors may be present despite my best efforts to edit errors. RUBEN Bryan - CNP   Low Dose CT (LDCT) Lung Screening criteria met:     Age 50-77(Medicare) or 50-80 (USPSTF)   Pack year smoking >20   Still smoking or less than 15 year since quit   No sign or symptoms of lung cancer   > 11 months since last LDCT     Risks and benefits of lung cancer screening with LDCT scans discussed:    Significance of positive screen - False-positive LDCT results often occur. 95% of all positive results do not lead to a diagnosis of cancer. Usually further imaging can resolve most false-positive results; however, some patients may require invasive procedures. Over diagnosis risk - 10% to 12% of screen-detected lung cancer cases are over diagnosed--that is, the cancer would not have been detected in the patient's lifetime without the screening.     Need for follow up screens annually to continue lung cancer screening effectiveness     Risks associated with radiation from annual LDCT- Radiation exposure is about the same as for a mammogram, which is about 1/3 of the annual background radiation exposure from everyday life. Starting screening at age 54 is not likely to increase cancer risk from radiation exposure. Patients with comorbidities resulting in life expectancy of < 10 years, or that would preclude treatment of an abnormality identified on CT, should not be screened due to lack of benefit.     To obtain maximal benefit from this screening, smoking cessation and long-term abstinence from smoking is critical

## 2022-05-13 ENCOUNTER — OFFICE VISIT (OUTPATIENT)
Dept: FAMILY MEDICINE CLINIC | Age: 62
End: 2022-05-13
Payer: MEDICARE

## 2022-05-13 VITALS
WEIGHT: 256 LBS | BODY MASS INDEX: 45.35 KG/M2 | DIASTOLIC BLOOD PRESSURE: 79 MMHG | SYSTOLIC BLOOD PRESSURE: 124 MMHG | HEART RATE: 74 BPM | TEMPERATURE: 98.1 F | OXYGEN SATURATION: 97 %

## 2022-05-13 DIAGNOSIS — R91.1 PULMONARY NODULE: ICD-10-CM

## 2022-05-13 DIAGNOSIS — K58.0 IRRITABLE BOWEL SYNDROME WITH DIARRHEA: ICD-10-CM

## 2022-05-13 DIAGNOSIS — E78.00 PURE HYPERCHOLESTEROLEMIA: ICD-10-CM

## 2022-05-13 DIAGNOSIS — J30.2 SEASONAL ALLERGIC RHINITIS, UNSPECIFIED TRIGGER: ICD-10-CM

## 2022-05-13 DIAGNOSIS — G43.109 MIGRAINE WITH AURA AND WITHOUT STATUS MIGRAINOSUS, NOT INTRACTABLE: ICD-10-CM

## 2022-05-13 DIAGNOSIS — G47.33 OSA (OBSTRUCTIVE SLEEP APNEA): ICD-10-CM

## 2022-05-13 DIAGNOSIS — I10 BENIGN ESSENTIAL HYPERTENSION: Primary | ICD-10-CM

## 2022-05-13 DIAGNOSIS — R35.1 NOCTURIA: ICD-10-CM

## 2022-05-13 DIAGNOSIS — E55.9 VITAMIN D DEFICIENCY: ICD-10-CM

## 2022-05-13 DIAGNOSIS — Z87.891 PERSONAL HISTORY OF TOBACCO USE: ICD-10-CM

## 2022-05-13 DIAGNOSIS — R35.0 URINARY FREQUENCY: ICD-10-CM

## 2022-05-13 PROCEDURE — G0296 VISIT TO DETERM LDCT ELIG: HCPCS | Performed by: NURSE PRACTITIONER

## 2022-05-13 PROCEDURE — 3017F COLORECTAL CA SCREEN DOC REV: CPT | Performed by: NURSE PRACTITIONER

## 2022-05-13 PROCEDURE — G8427 DOCREV CUR MEDS BY ELIG CLIN: HCPCS | Performed by: NURSE PRACTITIONER

## 2022-05-13 PROCEDURE — 4004F PT TOBACCO SCREEN RCVD TLK: CPT | Performed by: NURSE PRACTITIONER

## 2022-05-13 PROCEDURE — 99214 OFFICE O/P EST MOD 30 MIN: CPT | Performed by: NURSE PRACTITIONER

## 2022-05-13 PROCEDURE — G8417 CALC BMI ABV UP PARAM F/U: HCPCS | Performed by: NURSE PRACTITIONER

## 2022-05-13 RX ORDER — OXYBUTYNIN CHLORIDE 5 MG/1
5 TABLET, EXTENDED RELEASE ORAL DAILY
Qty: 30 TABLET | Refills: 3 | Status: SHIPPED | OUTPATIENT
Start: 2022-05-13 | End: 2022-09-01

## 2022-05-13 ASSESSMENT — PATIENT HEALTH QUESTIONNAIRE - PHQ9
SUM OF ALL RESPONSES TO PHQ QUESTIONS 1-9: 0
SUM OF ALL RESPONSES TO PHQ9 QUESTIONS 1 & 2: 0
2. FEELING DOWN, DEPRESSED OR HOPELESS: 0
1. LITTLE INTEREST OR PLEASURE IN DOING THINGS: 0
SUM OF ALL RESPONSES TO PHQ QUESTIONS 1-9: 0

## 2022-05-13 ASSESSMENT — ENCOUNTER SYMPTOMS
VOMITING: 0
ABDOMINAL PAIN: 0
COUGH: 1
DIARRHEA: 0
SORE THROAT: 0
WHEEZING: 0
EYE DISCHARGE: 1
NAUSEA: 0
SHORTNESS OF BREATH: 0
RHINORRHEA: 0

## 2022-05-13 NOTE — PATIENT INSTRUCTIONS
Please read the healthy family handout that you were given and share it with your family. Please compare this printed medication list with your medications at home to be sure they are the same. If you have any medications that are different please contact us immediately at [phone]. Also review your allergies that we have listed, these may also include medications that you have not been able to tolerate, make sure everything listed is correct. If you have any allergies that are different please contact us immediately at [phone]. What is lung cancer screening? Lung cancer screening is a way in which doctors check the lungs for early signs of cancer in people who have no symptoms of lung cancer. A low-dose CT scan uses much less radiation than a normal CT scan and shows a more detailed image of the lungs than a standard X-ray. The goal of lung cancer screening is to find cancer early, before it has a chance to grow, spread, or cause problems. One large study found that smokers who were screened with low-dose CT scans were less likely to die of lung cancer than those who were screened with standard X-ray. Below is a summary of the things you need to know regarding screening for lung cancer with low-dose computed tomography (LDCT). This is a screening program that involves routine annual screening with LDCT studies of the lung. The LDCTs are done using low-dose radiation that is not thought to increase your cancer risk. If you have other serious medical conditions (other cancers, congestive heart failure) that limit your life expectancy to less than 10 years, you should not undergo lung cancer screening with LDCT. The chance is 20%-60% that the LDCT result will show abnormalities. This would require additional testing which could include repeat imaging or even invasive procedures. Most (about 95%) of \"abnormal\" LDCT results are false in the sense that no lung cancer is ultimately found. Additionally, some (about 10%) of the cancers found would not affect your life expectancy, even if undetected and untreated. If you are still smoking, the single most important thing that you can do to reduce your risk of dying of lung cancer is to quit. For this screening to be covered by Medicare and most other insurers, strict criteria must be met. If you do not meet these criteria, but still wish to undergo LDCT testing, you will be required to sign a waiver indicating your willingness to pay for the scan.

## 2022-06-06 ENCOUNTER — TELEMEDICINE (OUTPATIENT)
Dept: FAMILY MEDICINE CLINIC | Age: 62
End: 2022-06-06
Payer: MEDICARE

## 2022-06-06 DIAGNOSIS — Z00.00 INITIAL MEDICARE ANNUAL WELLNESS VISIT: Primary | ICD-10-CM

## 2022-06-06 PROCEDURE — G0438 PPPS, INITIAL VISIT: HCPCS | Performed by: NURSE PRACTITIONER

## 2022-06-06 PROCEDURE — 3017F COLORECTAL CA SCREEN DOC REV: CPT | Performed by: NURSE PRACTITIONER

## 2022-06-06 ASSESSMENT — PATIENT HEALTH QUESTIONNAIRE - PHQ9
1. LITTLE INTEREST OR PLEASURE IN DOING THINGS: 0
SUM OF ALL RESPONSES TO PHQ9 QUESTIONS 1 & 2: 0
SUM OF ALL RESPONSES TO PHQ QUESTIONS 1-9: 0
2. FEELING DOWN, DEPRESSED OR HOPELESS: 0

## 2022-06-06 ASSESSMENT — LIFESTYLE VARIABLES: HOW OFTEN DO YOU HAVE A DRINK CONTAINING ALCOHOL: NEVER

## 2022-06-06 NOTE — PATIENT INSTRUCTIONS
Please read the healthy family handout that you were given and share it with your family. Please compare this printed medication list with your medications at home to be sure they are the same. If you have any medications that are different please contact us immediately at [phone]. Also review your allergies that we have listed, these may also include medications that you have not been able to tolerate, make sure everything listed is correct. If you have any allergies that are different please contact us immediately at [phone]. Personalized Preventive Plan for Kim Whalen - 6/6/2022  Medicare offers a range of preventive health benefits. Some of the tests and screenings are paid in full while other may be subject to a deductible, co-insurance, and/or copay. Some of these benefits include a comprehensive review of your medical history including lifestyle, illnesses that may run in your family, and various assessments and screenings as appropriate. After reviewing your medical record and screening and assessments performed today your provider may have ordered immunizations, labs, imaging, and/or referrals for you. A list of these orders (if applicable) as well as your Preventive Care list are included within your After Visit Summary for your review. Other Preventive Recommendations:    · A preventive eye exam performed by an eye specialist is recommended every 1-2 years to screen for glaucoma; cataracts, macular degeneration, and other eye disorders. · A preventive dental visit is recommended every 6 months. · Try to get at least 150 minutes of exercise per week or 10,000 steps per day on a pedometer . · Order or download the FREE \"Exercise & Physical Activity: Your Everyday Guide\" from [employer]. Call 3-376.703.9960 or search [employer] online. · You need 9726-2685 mg of calcium and 2825-3034 IU of vitamin D per day.  It is possible to meet your calcium requirement with diet alone, but a vitamin D supplement is usually necessary to meet this goal.  · When exposed to the sun, use a sunscreen that protects against both UVA and UVB radiation with an SPF of 30 or greater. Reapply every 2 to 3 hours or after sweating, drying off with a towel, or swimming. · Always wear a seat belt when traveling in a car. Always wear a helmet when riding a bicycle or motorcycle.

## 2022-06-06 NOTE — PROGRESS NOTES
Medicare Annual Wellness Visit    Corinne Granda is here for Medicare AWV    Assessment & Plan   Initial Medicare annual wellness visit  -     Ambulatory Referral to ACP Clinical Specialist      Recommendations for Preventive Services Due: see orders and patient instructions/AVS.  Recommended screening schedule for the next 5-10 years is provided to the patient in written form: see Patient Instructions/AVS.     Return for Medicare Annual Wellness Visit in 1 year. Subjective   Presents today via telephone encounter for Medicare wellness visit. Patient's complete Health Risk Assessment and screening values have been reviewed and are found in Flowsheets. The following problems were reviewed today and where indicated follow up appointments were made and/or referrals ordered.     Positive Risk Factor Screenings with Interventions:         Tobacco Use:     Tobacco Use: High Risk    Smoking Tobacco Use: Current Every Day Smoker    Smokeless Tobacco Use: Former User     E-Cigarettes/Vaping Use     Questions Responses    E-Cigarette/Vaping Use Never User    Start Date     Passive Exposure     Quit Date     Counseling Given     Comments         Substance Use - Tobacco Interventions:  tobacco cessation tips and resources provided, patient declines, provider spent 10 minutes counseling patient           General Health and ACP:  General  In general, how would you say your health is?: Fair  In the past 7 days, have you experienced any of the following: New or Increased Pain, New or Increased Fatigue, Loneliness, Social Isolation, Stress or Anger?: No  Do you get the social and emotional support that you need?: Yes  Do you have a Living Will?: (!) No    Advance Directives     Power of  Living Will ACP-Advance Directive ACP-Power of     Not on File Not on File Not on File Not on File      General Health Risk Interventions:  · No Living Will: Patient referred to [place] Habits/Nutrition:     Physical Activity: Inactive    Days of Exercise per Week: 0 days    Minutes of Exercise per Session: 0 min     Have you lost any weight without trying in the past 3 months?: No     Have you seen the dentist within the past year?: Yes    Health Habits/Nutrition Interventions:  · Inadequate physical activity:  patient reports gardening more lately but suffers from chronic back pain. pain managment ordered MRI             Objective      Patient-Reported Vitals  No data recorded   Unable to perform exam due to Medicare wellness visit performed via telephone encounter. Allergies   Allergen Reactions    Codeine     Morphine And Related     Sulfa Antibiotics Other (See Comments)     Caused elevation of WBC  elevated WBC    Itraconazole      Swelling of feet     Prior to Visit Medications    Medication Sig Taking? Authorizing Provider   POTASSIUM PO Take by mouth OTC Yes Historical Provider, MD   oxybutynin (DITROPAN XL) 5 MG extended release tablet Take 1 tablet by mouth daily Yes RUBEN Son CNP   lisinopril-hydroCHLOROthiazide (PRINZIDE;ZESTORETIC) 10-12.5 MG per tablet TAKE 1 TABLET ONCE DAILY Yes RUBEN Son CNP   lidocaine (LIDODERM) 5 % Place 1 patch onto the skin daily 12 hours on, 12 hours off.  Yes RUBEN Son CNP   oxyCODONE-acetaminophen (PERCOCET) 7.5-325 MG per tablet  Yes Historical Provider, MD   Cholecalciferol (VITAMIN D3) 125 MCG (5000 UT) TABS Take by mouth Yes Historical Provider, MD   Simethicone (GAS RELIEF 125 MAX ST PO) Take by mouth Yes Historical Provider, MD   meloxicam (MOBIC) 15 MG tablet Take 15 mg by mouth daily Yes Historical Provider, MD   aspirin 81 MG tablet Take 81 mg by mouth daily Yes Historical Provider, MD   gabapentin (NEURONTIN) 300 MG capsule TAKE THREE CAPSULES BY MOUTH THREE TIMES A DAY Yes Akanksha Bradshaw MD   alosetron (LOTRONEX) 0.5 MG tablet Take 1 tablet by mouth 2 times daily  MD Radha Cano (Including outside providers/suppliers regularly involved in providing care):   Patient Care Team:  RUBEN Ramos CNP as PCP - General (Nurse Practitioner)  RUBEN Ramos CNP as PCP - [facility] Empaneled Provider  RUBEN Argueta CNP as Nurse Practitioner (Nurse Practitioner)  Matthew Bryant RN as Registered Nurse  Flaquito Locker, APRN - CNP (Nurse Practitioner)     Reviewed and updated this visit:  Allergies  Steph Erwin, was evaluated through a synchronous (real-time) audio encounter. The patient (or guardian if applicable) is aware that this is a billable service, which includes applicable co-pays. This Virtual Visit was conducted with patient's (and/or legal guardian's) consent. The visit was conducted pursuant to the emergency declaration under the [address] [place] [address] waiver authority and the [employer] General Act. Patient identification was verified, and a caregiver was present when appropriate. The patient was located at Home: [facility] [address] [address]. Provider was located at [facility] (Appt Dept): Deepika Stacy [address] [place] [address],  [address] [place] [address]

## 2022-06-16 ENCOUNTER — CLINICAL DOCUMENTATION (OUTPATIENT)
Dept: SPIRITUAL SERVICES | Age: 62
End: 2022-06-16

## 2022-06-16 NOTE — ACP (ADVANCE CARE PLANNING)
Advance Care Planning   Ambulatory ACP Specialist Patient Outreach    Date:  6/16/2022  ACP Specialist:  Hussain Betancourt    Outreach call to patient in follow-up to ACP Specialist referral from: RUBEN Sierra CNP    [x] PCP  [] Provider   [] Ambulatory Care Management [] Other for Reason:    [x] Advance Directive Assistance  [] Code Status Discussion  [] Complete Portable DNR Order  [x] Discuss Goals of Care  [] Complete POST/MOST  [] Early ACP Decision-Making  [] Other    Date Referral Received: 6/6/22    Today's Outreach:  [x] First   [] Second  [] Third                               Third outreach made by []  phone  [] email []   [employer]     Intervention:  [x] Spoke with Patient  [] Left VM requesting return call      Outcome: Pt was busy cutting her grass and asked to call another time. This outpatient /ACP Specialist told pt we'd try to call again next week. That was fine with pt. Next Step:   [] ACP scheduled conversation  [x] Outreach again in one week               [] Email / Mail ACP Info Sheets  [] Email / Mail Advance Directive            [] Close Referral. Routing closure to referring provider/staff and to ACP Specialist .      Thank you for this referral.

## 2022-07-15 ENCOUNTER — CLINICAL DOCUMENTATION (OUTPATIENT)
Dept: SPIRITUAL SERVICES | Age: 62
End: 2022-07-15

## 2022-07-15 NOTE — ACP (ADVANCE CARE PLANNING)
Advance Care Planning   Ambulatory ACP Specialist Patient Outreach    Date:  7/15/2022  ACP Specialist:  Robert Hill    Outreach call to patient in follow-up to ACP Specialist referral from: RUBEN Parada CNP    [x] PCP  [] Provider   [] Ambulatory Care Management [] Other for Reason:    [x] Advance Directive Assistance  [] Code Status Discussion  [] Complete Portable DNR Order  [] Discuss Goals of Care  [] Complete POST/MOST  [] Early ACP Decision-Making  [] Other    Date Referral Received:6/6/22    Today's Outreach:  [] First   [x] Second  [] Third                               Third outreach made by [x]  phone  [] email []   [employer]     Intervention:  [] Spoke with Patient  [x] Left VM requesting return call      Outcome:Second attempt to reach Pt for Advance Directives referral. Left voicemail for Pt. Will attempt to follow up again next week. Next Step:   [] ACP scheduled conversation  [x] Outreach again in one week               [] Email / Mail ACP Info Sheets  [] Email / Mail Advance Directive            [] Close Referral. Routing closure to referring provider/staff and to ACP Specialist . [] Closure Letter mailed to Patient with Invitation to Contact ACP Specialist if/when ready.     Thank you for this referral.

## 2022-07-18 ENCOUNTER — HOSPITAL ENCOUNTER (OUTPATIENT)
Dept: MRI IMAGING | Age: 62
Discharge: HOME | End: 2022-07-18
Payer: MEDICARE

## 2022-07-18 DIAGNOSIS — M51.17 INTERVERTEBRAL DISC DISORDER WITH RADICULOPATHY OF LUMBOSACRAL REGION: ICD-10-CM

## 2022-07-18 PROCEDURE — 72148 MRI LUMBAR SPINE W/O DYE: CPT

## 2022-08-03 ENCOUNTER — CLINICAL DOCUMENTATION (OUTPATIENT)
Dept: SPIRITUAL SERVICES | Age: 62
End: 2022-08-03

## 2022-08-03 NOTE — ACP (ADVANCE CARE PLANNING)
Advance Care Planning   Ambulatory ACP Specialist Patient Outreach    Date:  8/3/2022  ACP Specialist:  Adeel Villatoro    Outreach call to patient in follow-up to ACP Specialist referral from: RUBEN Gordon CNP    [x] PCP  [] Provider   [] Ambulatory Care Management [] Other for Reason:    [x] Advance Directive Assistance  [] Code Status Discussion  [] Complete Portable DNR Order  [] Discuss Goals of Care  [] Complete POST/MOST  [] Early ACP Decision-Making  [] Other    Date Referral Received:6/6/22    Today's Outreach:  [] First   [] Second  [x] Third                               Third outreach made by []  phone  [] email []   [employer]     Intervention:  [] Spoke with Patient  [x] Left VM requesting return call      Outcome:Final attempt to reach Pt for Advance Directives referral. Left voicemail for Pt. Will mail copy of AD forms with note letting Pt know of our continued availability to help complete them when ready. Next Step:   [] ACP scheduled conversation  [] Outreach again in one week               [] Email / Mail ACP Info Sheets  [x] Email / Mail Advance Directive            [x] Close Referral. Routing closure to referring provider/staff and to ACP Specialist . [x] Closure Letter mailed to Patient with Invitation to Contact ACP Specialist if/when ready.     Thank you for this referral.

## 2022-09-01 DIAGNOSIS — R35.0 URINARY FREQUENCY: ICD-10-CM

## 2022-09-01 DIAGNOSIS — R35.1 NOCTURIA: ICD-10-CM

## 2022-09-01 RX ORDER — OXYBUTYNIN CHLORIDE 5 MG/1
5 TABLET, EXTENDED RELEASE ORAL DAILY
Qty: 30 TABLET | Refills: 3 | Status: SHIPPED | OUTPATIENT
Start: 2022-09-01

## 2022-11-17 ENCOUNTER — OFFICE VISIT (OUTPATIENT)
Dept: FAMILY MEDICINE CLINIC | Age: 62
End: 2022-11-17
Payer: MEDICARE

## 2022-11-17 VITALS
DIASTOLIC BLOOD PRESSURE: 75 MMHG | OXYGEN SATURATION: 96 % | HEART RATE: 65 BPM | TEMPERATURE: 97.8 F | SYSTOLIC BLOOD PRESSURE: 118 MMHG | WEIGHT: 256.13 LBS | BODY MASS INDEX: 45.37 KG/M2

## 2022-11-17 DIAGNOSIS — R25.2 LEG CRAMPS: ICD-10-CM

## 2022-11-17 DIAGNOSIS — G43.109 MIGRAINE WITH AURA AND WITHOUT STATUS MIGRAINOSUS, NOT INTRACTABLE: ICD-10-CM

## 2022-11-17 DIAGNOSIS — B07.0 PLANTAR WART OF LEFT FOOT: ICD-10-CM

## 2022-11-17 DIAGNOSIS — M15.9 PRIMARY OSTEOARTHRITIS INVOLVING MULTIPLE JOINTS: ICD-10-CM

## 2022-11-17 DIAGNOSIS — I10 BENIGN ESSENTIAL HYPERTENSION: Primary | ICD-10-CM

## 2022-11-17 DIAGNOSIS — G47.33 OSA (OBSTRUCTIVE SLEEP APNEA): ICD-10-CM

## 2022-11-17 DIAGNOSIS — E55.9 VITAMIN D DEFICIENCY: ICD-10-CM

## 2022-11-17 DIAGNOSIS — K58.0 IRRITABLE BOWEL SYNDROME WITH DIARRHEA: ICD-10-CM

## 2022-11-17 DIAGNOSIS — R91.1 PULMONARY NODULE: ICD-10-CM

## 2022-11-17 DIAGNOSIS — E78.00 PURE HYPERCHOLESTEROLEMIA: ICD-10-CM

## 2022-11-17 LAB
A/G RATIO: 1.6 (ref 1.1–2.2)
ALBUMIN SERPL-MCNC: 4.1 G/DL (ref 3.4–5)
ALP BLD-CCNC: 72 U/L (ref 40–129)
ALT SERPL-CCNC: 14 U/L (ref 10–40)
ANION GAP SERPL CALCULATED.3IONS-SCNC: 12 MMOL/L (ref 3–16)
AST SERPL-CCNC: 16 U/L (ref 15–37)
BASOPHILS ABSOLUTE: 0 K/UL (ref 0–0.2)
BASOPHILS RELATIVE PERCENT: 0.4 %
BILIRUB SERPL-MCNC: 0.4 MG/DL (ref 0–1)
BUN BLDV-MCNC: 14 MG/DL (ref 7–20)
CALCIUM SERPL-MCNC: 9.8 MG/DL (ref 8.3–10.6)
CHLORIDE BLD-SCNC: 103 MMOL/L (ref 99–110)
CHOLESTEROL, FASTING: 203 MG/DL (ref 0–199)
CO2: 22 MMOL/L (ref 21–32)
CREAT SERPL-MCNC: 0.7 MG/DL (ref 0.6–1.2)
EOSINOPHILS ABSOLUTE: 0.2 K/UL (ref 0–0.6)
EOSINOPHILS RELATIVE PERCENT: 2.2 %
GFR SERPL CREATININE-BSD FRML MDRD: >60 ML/MIN/{1.73_M2}
GLUCOSE BLD-MCNC: 88 MG/DL (ref 70–99)
HCT VFR BLD CALC: 40 % (ref 36–48)
HDLC SERPL-MCNC: 36 MG/DL (ref 40–60)
HEMOGLOBIN: 12.9 G/DL (ref 12–16)
LDL CHOLESTEROL CALCULATED: 131 MG/DL
LYMPHOCYTES ABSOLUTE: 2.7 K/UL (ref 1–5.1)
LYMPHOCYTES RELATIVE PERCENT: 32.4 %
MAGNESIUM: 1.8 MG/DL (ref 1.8–2.4)
MCH RBC QN AUTO: 31 PG (ref 26–34)
MCHC RBC AUTO-ENTMCNC: 32.2 G/DL (ref 31–36)
MCV RBC AUTO: 96.2 FL (ref 80–100)
MONOCYTES ABSOLUTE: 0.6 K/UL (ref 0–1.3)
MONOCYTES RELATIVE PERCENT: 6.9 %
NEUTROPHILS ABSOLUTE: 4.8 K/UL (ref 1.7–7.7)
NEUTROPHILS RELATIVE PERCENT: 58.1 %
PDW BLD-RTO: 14.6 % (ref 12.4–15.4)
PLATELET # BLD: 309 K/UL (ref 135–450)
PMV BLD AUTO: 9.9 FL (ref 5–10.5)
POTASSIUM SERPL-SCNC: 5.1 MMOL/L (ref 3.5–5.1)
RBC # BLD: 4.15 M/UL (ref 4–5.2)
SODIUM BLD-SCNC: 137 MMOL/L (ref 136–145)
TOTAL PROTEIN: 6.6 G/DL (ref 6.4–8.2)
TRIGLYCERIDE, FASTING: 180 MG/DL (ref 0–150)
VITAMIN D 25-HYDROXY: 34.6 NG/ML
VLDLC SERPL CALC-MCNC: 36 MG/DL
WBC # BLD: 8.3 K/UL (ref 4–11)

## 2022-11-17 PROCEDURE — G8417 CALC BMI ABV UP PARAM F/U: HCPCS | Performed by: NURSE PRACTITIONER

## 2022-11-17 PROCEDURE — G8484 FLU IMMUNIZE NO ADMIN: HCPCS | Performed by: NURSE PRACTITIONER

## 2022-11-17 PROCEDURE — 36415 COLL VENOUS BLD VENIPUNCTURE: CPT | Performed by: NURSE PRACTITIONER

## 2022-11-17 PROCEDURE — 3074F SYST BP LT 130 MM HG: CPT | Performed by: NURSE PRACTITIONER

## 2022-11-17 PROCEDURE — G8427 DOCREV CUR MEDS BY ELIG CLIN: HCPCS | Performed by: NURSE PRACTITIONER

## 2022-11-17 PROCEDURE — 4004F PT TOBACCO SCREEN RCVD TLK: CPT | Performed by: NURSE PRACTITIONER

## 2022-11-17 PROCEDURE — 99214 OFFICE O/P EST MOD 30 MIN: CPT | Performed by: NURSE PRACTITIONER

## 2022-11-17 PROCEDURE — 3017F COLORECTAL CA SCREEN DOC REV: CPT | Performed by: NURSE PRACTITIONER

## 2022-11-17 PROCEDURE — 3078F DIAST BP <80 MM HG: CPT | Performed by: NURSE PRACTITIONER

## 2022-11-17 ASSESSMENT — ENCOUNTER SYMPTOMS
SHORTNESS OF BREATH: 0
RHINORRHEA: 0
COUGH: 0
ROS SKIN COMMENTS: WART ON BOTTOM OF LEFT FOOT
WHEEZING: 0
ABDOMINAL PAIN: 0
DIARRHEA: 0
NAUSEA: 0
SORE THROAT: 0
VOMITING: 0

## 2022-11-17 NOTE — PATIENT INSTRUCTIONS
Please read the healthy family handout that you were given and share it with your family. Please compare this printed medication list with your medications at home to be sure they are the same. If you have any medications that are different please contact us immediately at [phone]. Also review your allergies that we have listed, these may also include medications that you have not been able to tolerate, make sure everything listed is correct. If you have any allergies that are different please contact us immediately at [phone]. You may receive a survey in the mail or by email asking about your experience during your visit today. Please complete and return to us so we know how we are serving you.

## 2022-11-17 NOTE — PROGRESS NOTES
CHIEF COMPLAINT  Chief Complaint   Patient presents with    Check-Up     HTN        HPI   Brynn Wheeler is a 60 y.o. female who presents to the office for checkup. Reports doing well. No new unusual fatigue or unexplained weight loss. No chest pain or shortness of breath. No abdominal pain or discomfort. No nausea, vomiting, diarrhea. No dark or tarry stools. Patient reports pain in the bottom of her left foot. Patient also reports some cramping in her left leg. Patient reports seeing pain management specialist for injections in her back. No other complaints, modifying factors or associated symptoms. Nursing notes reviewed.    Past Medical History:   Diagnosis Date    Chest pain 11/10    negative myoview stress    Hematuria 10/2016    neg cysto, Dr Ayanna Key    Hypertension     PONV (postoperative nausea and vomiting)     Primary osteoarthritis involving multiple joints     hands, Dr Emily Elizabeth    Routine health maintenance      TAC 5/13    Sleep apnea     did not tolerate CPAP    Smoker 12/4/2018    Tarsal tunnel syndrome 2014     Past Surgical History:   Procedure Laterality Date    ARTHROPLASTY Right 10/21/2019    RIGHT THUMB CARPOMETACARPAL ARTHROPLASTY -BLOCK- -SLEEP APNEA- performed by Kelechi Dumont MD at [address]  2004    stomach stapling    COLONOSCOPY  2011    polyps    COLONOSCOPY  01/13/2021    COLONOSCOPY N/A 1/13/2021    COLONOSCOPY WITH BIOPSY performed by Elvi Cast MD at [address] [place] [address] [address] Left 12/9/14    plantar fasciotomy, tendon transfer, gastrocnemius recession/cortisone    HAND TENDON SURGERY Right 10/21/2019    FLEXOR CARPI RADIALIS TENDON INTERPOSITION performed by Kelechi Dumont MD at [facility] ([address] [place] [address])      KNEE ARTHROSCOPY      OTHER SURGICAL HISTORY  10/14/2016    cystoscopy, bilateral retrogrades, urethral dilation    URETHRAL STRICTURE DILATATION  7/14     Family History Problem Relation Age of Onset    High Blood Pressure Mother     High Cholesterol Mother     Diabetes Mother     Stroke Mother     High Blood Pressure Father     High Cholesterol Father     Asthma Father     High Blood Pressure Brother     High Cholesterol Brother     Heart Disease Brother 62    Diabetes Brother      Social History     Socioeconomic History    Marital status:      Spouse name: Not on file    Number of children: Not on file    Years of education: Not on file    Highest education level: Not on file   Occupational History    Not on file   Tobacco Use    Smoking status: Every Day     Packs/day: 1.00     Years: 42.00     Pack years: 42.00     Types: Cigarettes    Smokeless tobacco: Former     Quit date: 3/2/2021   Vaping Use    Vaping Use: Never used   Substance and Sexual Activity    Alcohol use: No     Alcohol/week: 0.0 standard drinks    Drug use: No    Sexual activity: Not Currently   Other Topics Concern    Not on file   Social History Narrative    Not on file     Social Determinants of Health     Financial Resource Strain: Not on file   Food Insecurity: Not on file   Transportation Needs: Not on file   Physical Activity: Inactive    Days of Exercise per Week: 0 days    Minutes of Exercise per Session: 0 min   Stress: Not on file   Social Connections: Not on file   Intimate Partner Violence: Not on file   Housing Stability: Not on file     Current Outpatient Medications   Medication Sig Dispense Refill    oxybutynin (DITROPAN-XL) 5 MG extended release tablet TAKE 1 TABLET BY MOUTH DAILY 30 tablet 3    POTASSIUM PO Take by mouth OTC - every other day      lisinopril-hydroCHLOROthiazide (PRINZIDE;ZESTORETIC) 10-12.5 MG per tablet TAKE 1 TABLET ONCE DAILY 90 tablet 1    lidocaine (LIDODERM) 5 % Place 1 patch onto the skin daily 12 hours on, 12 hours off.  30 patch 0    oxyCODONE-acetaminophen (PERCOCET) 7.5-325 MG per tablet       Cholecalciferol (VITAMIN D3) 125 MCG (5000 UT) TABS Take by mouth meloxicam (MOBIC) 15 MG tablet Take 15 mg by mouth daily      aspirin 81 MG tablet Take 81 mg by mouth daily      gabapentin (NEURONTIN) 300 MG capsule TAKE THREE CAPSULES BY MOUTH THREE TIMES A  capsule 1    alosetron (LOTRONEX) 0.5 MG tablet Take 1 tablet by mouth 2 times daily 60 tablet 1     No current facility-administered medications for this visit. Allergies   Allergen Reactions    Codeine     Morphine And Related     Sulfa Antibiotics Other (See Comments)     Caused elevation of WBC  elevated WBC    Itraconazole      Swelling of feet       REVIEW OF SYSTEMS  Review of Systems   Constitutional:  Negative for activity change, appetite change, chills and fever. HENT:  Negative for congestion, rhinorrhea and sore throat. Eyes:  Negative for visual disturbance. Respiratory:  Negative for cough, shortness of breath and wheezing. Cardiovascular:  Negative for chest pain and leg swelling. Gastrointestinal:  Negative for abdominal pain, diarrhea, nausea and vomiting. Genitourinary:  Negative for dysuria, frequency, hematuria and urgency. Urinary frequency at night   Musculoskeletal:  Positive for arthralgias. Negative for gait problem and myalgias. Skin:  Negative for rash. Wart on bottom of left foot   Neurological:  Negative for dizziness, weakness, light-headedness, numbness and headaches. Psychiatric/Behavioral:  Negative for self-injury and sleep disturbance. The patient is not nervous/anxious. PHYSICAL EXAM  /75   Pulse 65   Temp 97.8 °F (36.6 °C) (Oral)   Wt 256 lb 2 oz (116.2 kg)   SpO2 96%   BMI 45.37 kg/m²   Physical Exam  Vitals reviewed. Constitutional:       General: She is not in acute distress. Appearance: Normal appearance. She is well-developed. She is not diaphoretic. HENT:      Head: Normocephalic and atraumatic.       Right Ear: External ear normal.      Left Ear: External ear normal.      Nose: Nose normal.      Mouth/Throat: Mouth: Mucous membranes are moist.   Eyes:      General:         Right eye: No discharge. Left eye: No discharge. Pupils: Pupils are equal, round, and reactive to light. Neck:      Vascular: No JVD. Cardiovascular:      Rate and Rhythm: Normal rate and regular rhythm. Pulses: Normal pulses. Pulmonary:      Effort: Pulmonary effort is normal. No respiratory distress. Breath sounds: No stridor. No wheezing, rhonchi or rales. Chest:      Chest wall: No tenderness. Abdominal:      General: Bowel sounds are normal.      Palpations: Abdomen is soft. Musculoskeletal:         General: No swelling or deformity. Normal range of motion. Cervical back: Normal range of motion and neck supple. Feet:    Skin:     General: Skin is warm and dry. Capillary Refill: Capillary refill takes less than 2 seconds. Coloration: Skin is not pale. Findings: No bruising, lesion or rash. Neurological:      General: No focal deficit present. Mental Status: She is alert and oriented to person, place, and time. Psychiatric:         Mood and Affect: Mood normal.         Behavior: Behavior normal.        ASSESSMENT/PLAN:   1. Benign essential hypertension  Stable. Patient compliant with lisinopril-hydrochlorothiazide daily. Patient courage to monitor dietary intake, regular exercise and weight loss. Continue with current treatment management follow-up in 6 months, sooner for new or worsening symptoms.  - CBC with Auto Differential  - Comprehensive Metabolic Panel    2. Irritable bowel syndrome with diarrhea  Stable. Patient compliant with gas relief. No new or worsening symptoms. Continue current treatment management follow-up in 6 to 12 months, sooner for new or worsening symptoms. 3. Migraine with aura and without status migrainosus, not intractable  Stable. Currently asymptomatic.   Continue with current treatment management follow-up in 6 months, sooner for new or worsening symptoms. 4. SPENCER (obstructive sleep apnea)  Stable. Patient denies use of CPAP. Patient reports sleeping well throughout the night other than getting up to use the restroom. Continue with current treatment management follow-up in 1 year, sooner for new or worsening symptoms. 5. Primary osteoarthritis involving multiple joints  Stable. Managed by pain management. Patient reports receiving injections in her back yesterday. Continue current treatment management follow-up in 6 months, sooner for new or worsening symptoms. 6. Pure hypercholesterolemia  Stable. Labs ordered and pending. Patient counseled the importance of diet, exercise and weight loss. Continue with current treatment management follow-up in 6 months, sooner for new or worsening symptoms.  - Lipid, Fasting    7. Vitamin D deficiency  Stable. .Patient compliant with vitamin D supplement. Continue with current treatment management follow-up in 6 months, sooner for new or worsening symptoms. - Vitamin D 25 Hydroxy    8. Pulmonary nodule  Stable. Patient continues to smoke daily. Patient due for CT lung screening. patient scheduled for 12/9, will follow up with results. 9. Leg cramps  Patient reports leg cramps worse at night. We discussed the importance of proper hydration. Patient reports that she does not drink much water. Patient continues to smoke daily. We did discuss the importance of smoking cessation. Compression stockings encouraged. Labs ordered today and pending. We will follow-up if symptoms do not improve. - Magnesium    10. Body mass index (BMI) 45.0-49.9, adult [facility])  Patient counseled importance of regular diet, exercise and weight loss. Patient acknowledges. 11. Planter wart, left foot  Stable. .  Plantar wart noted on the bottom of the patient's left foot. Patient reports noticing pain over the past several days.   I did recommend over-the-counter treatment if not resolved come back in for cryotherapy. Patient acknowledges. The note was completed using Dragon voice recognition transcription. Every effort was made to ensure accuracy; however, inadvertent  transcription errors may be present despite my best efforts to edit errors.     RUBEN Lynch - CNP

## 2022-11-21 ENCOUNTER — OFFICE VISIT (OUTPATIENT)
Dept: FAMILY MEDICINE CLINIC | Age: 62
End: 2022-11-21
Payer: MEDICARE

## 2022-11-21 VITALS
WEIGHT: 250.5 LBS | HEART RATE: 69 BPM | SYSTOLIC BLOOD PRESSURE: 119 MMHG | BODY MASS INDEX: 44.37 KG/M2 | TEMPERATURE: 97.8 F | OXYGEN SATURATION: 95 % | DIASTOLIC BLOOD PRESSURE: 74 MMHG

## 2022-11-21 DIAGNOSIS — N30.01 ACUTE CYSTITIS WITH HEMATURIA: ICD-10-CM

## 2022-11-21 DIAGNOSIS — R35.0 URINARY FREQUENCY: Primary | ICD-10-CM

## 2022-11-21 DIAGNOSIS — N39.41 URGE INCONTINENCE OF URINE: ICD-10-CM

## 2022-11-21 DIAGNOSIS — E78.00 PURE HYPERCHOLESTEROLEMIA: Primary | ICD-10-CM

## 2022-11-21 LAB
BILIRUBIN, POC: ABNORMAL
BLOOD URINE, POC: ABNORMAL
CLARITY, POC: CLEAR
COLOR, POC: YELLOW
GLUCOSE URINE, POC: ABNORMAL
KETONES, POC: ABNORMAL
LEUKOCYTE EST, POC: ABNORMAL
NITRITE, POC: ABNORMAL
PH, POC: 5
PROTEIN, POC: ABNORMAL
SPECIFIC GRAVITY, POC: <=1.005
UROBILINOGEN, POC: 0.2

## 2022-11-21 PROCEDURE — G8484 FLU IMMUNIZE NO ADMIN: HCPCS | Performed by: NURSE PRACTITIONER

## 2022-11-21 PROCEDURE — 4004F PT TOBACCO SCREEN RCVD TLK: CPT | Performed by: NURSE PRACTITIONER

## 2022-11-21 PROCEDURE — G8427 DOCREV CUR MEDS BY ELIG CLIN: HCPCS | Performed by: NURSE PRACTITIONER

## 2022-11-21 PROCEDURE — G8417 CALC BMI ABV UP PARAM F/U: HCPCS | Performed by: NURSE PRACTITIONER

## 2022-11-21 PROCEDURE — 3017F COLORECTAL CA SCREEN DOC REV: CPT | Performed by: NURSE PRACTITIONER

## 2022-11-21 PROCEDURE — 99213 OFFICE O/P EST LOW 20 MIN: CPT | Performed by: NURSE PRACTITIONER

## 2022-11-21 PROCEDURE — 81002 URINALYSIS NONAUTO W/O SCOPE: CPT | Performed by: NURSE PRACTITIONER

## 2022-11-21 PROCEDURE — 3074F SYST BP LT 130 MM HG: CPT | Performed by: NURSE PRACTITIONER

## 2022-11-21 PROCEDURE — 3078F DIAST BP <80 MM HG: CPT | Performed by: NURSE PRACTITIONER

## 2022-11-21 RX ORDER — NITROFURANTOIN 25; 75 MG/1; MG/1
100 CAPSULE ORAL 2 TIMES DAILY
Qty: 20 CAPSULE | Refills: 0 | Status: SHIPPED | OUTPATIENT
Start: 2022-11-21 | End: 2022-12-01

## 2022-11-21 RX ORDER — ROSUVASTATIN CALCIUM 10 MG/1
10 TABLET, COATED ORAL NIGHTLY
Qty: 30 TABLET | Refills: 3 | Status: SHIPPED | OUTPATIENT
Start: 2022-11-21

## 2022-11-21 ASSESSMENT — ENCOUNTER SYMPTOMS
RESPIRATORY NEGATIVE: 1
GASTROINTESTINAL NEGATIVE: 1

## 2022-11-21 NOTE — PROGRESS NOTES
CHIEF COMPLAINT  Chief Complaint   Patient presents with    Urinary Frequency    Incontinence     urine        HPI   Fany Vazquez is a 60 y.o. female who presents to the office complaining of urinary frequency urgency and incontinence. Patient reports that symptoms have worsened since Friday. Patient denies any dysuria or hematuria. Patient reports that she has had procedures in the past with her bladder unable to \"empty \"patient reports that since then that provider has retired. Patient denies any abdominal pain or low back pain. Patient denies any  nausea, vomiting, diarrhea, fever or chills. No other complaints, modifying factors or associated symptoms. Nursing notes reviewed.    Past Medical History:   Diagnosis Date    Chest pain 11/10    negative myoview stress    Hematuria 10/2016    neg cysto, Dr Sudarshan Manzano    Hypertension     PONV (postoperative nausea and vomiting)     Primary osteoarthritis involving multiple joints     hands, Dr Bijan Dhaliwal    Routine health maintenance      TAC 5/13    Sleep apnea     did not tolerate CPAP    Smoker 12/4/2018    Tarsal tunnel syndrome 2014     Past Surgical History:   Procedure Laterality Date    ARTHROPLASTY Right 10/21/2019    RIGHT THUMB CARPOMETACARPAL ARTHROPLASTY -BLOCK- -SLEEP APNEA- performed by Tatiana Xie MD at [address]  2004    stomach stapling    COLONOSCOPY  2011    polyps    COLONOSCOPY  01/13/2021    COLONOSCOPY N/A 1/13/2021    COLONOSCOPY WITH BIOPSY performed by Toro Godoy MD at [address] [place] [address] [facility] 399 Left 12/9/14    plantar fasciotomy, tendon transfer, gastrocnemius recession/cortisone    HAND TENDON SURGERY Right 10/21/2019    FLEXOR CARPI RADIALIS TENDON INTERPOSITION performed by Tatiana Xie MD at [facility] 10 ([facility])      KNEE ARTHROSCOPY      OTHER SURGICAL HISTORY  10/14/2016    cystoscopy, bilateral retrogrades, urethral dilation URETHRAL STRICTURE DILATATION  7/14     Family History   Problem Relation Age of Onset    High Blood Pressure Mother     High Cholesterol Mother     Diabetes Mother     Stroke Mother     High Blood Pressure Father     High Cholesterol Father     Asthma Father     High Blood Pressure Brother     High Cholesterol Brother     Heart Disease Brother 62    Diabetes Brother      Social History     Socioeconomic History    Marital status:      Spouse name: Not on file    Number of children: Not on file    Years of education: Not on file    Highest education level: Not on file   Occupational History    Not on file   Tobacco Use    Smoking status: Every Day     Packs/day: 1.00     Years: 42.00     Pack years: 42.00     Types: Cigarettes    Smokeless tobacco: Former     Quit date: 3/2/2021   Vaping Use    Vaping Use: Never used   Substance and Sexual Activity    Alcohol use: No     Alcohol/week: 0.0 standard drinks    Drug use: No    Sexual activity: Not Currently   Other Topics Concern    Not on file   Social History Narrative    Not on file     Social Determinants of Health     Financial Resource Strain: Not on file   Food Insecurity: Not on file   Transportation Needs: Not on file   Physical Activity: Inactive    Days of Exercise per Week: 0 days    Minutes of Exercise per Session: 0 min   Stress: Not on file   Social Connections: Not on file   Intimate Partner Violence: Not on file   Housing Stability: Not on file     Current Outpatient Medications   Medication Sig Dispense Refill    nitrofurantoin, macrocrystal-monohydrate, (MACROBID) 100 MG capsule Take 1 capsule by mouth 2 times daily for 10 days 20 capsule 0    oxybutynin (DITROPAN-XL) 5 MG extended release tablet TAKE 1 TABLET BY MOUTH DAILY 30 tablet 3    POTASSIUM PO Take by mouth OTC - every other day      lisinopril-hydroCHLOROthiazide (PRINZIDE;ZESTORETIC) 10-12.5 MG per tablet TAKE 1 TABLET ONCE DAILY 90 tablet 1    lidocaine (LIDODERM) 5 % Place 1 patch onto the skin daily 12 hours on, 12 hours off. 30 patch 0    oxyCODONE-acetaminophen (PERCOCET) 7.5-325 MG per tablet       Cholecalciferol (VITAMIN D3) 125 MCG (5000 UT) TABS Take by mouth      meloxicam (MOBIC) 15 MG tablet Take 15 mg by mouth daily      aspirin 81 MG tablet Take 81 mg by mouth daily      gabapentin (NEURONTIN) 300 MG capsule TAKE THREE CAPSULES BY MOUTH THREE TIMES A  capsule 1    alosetron (LOTRONEX) 0.5 MG tablet Take 1 tablet by mouth 2 times daily 60 tablet 1     No current facility-administered medications for this visit. Allergies   Allergen Reactions    Codeine     Morphine And Related     Sulfa Antibiotics Other (See Comments)     Caused elevation of WBC  elevated WBC    Itraconazole      Swelling of feet       REVIEW OF SYSTEMS  Review of Systems   Constitutional: Negative. HENT: Negative. Respiratory: Negative. Cardiovascular: Negative. Gastrointestinal: Negative. Genitourinary:  Positive for frequency and urgency. Negative for dysuria. Musculoskeletal: Negative. Skin: Negative. Neurological: Negative. Psychiatric/Behavioral: Negative. PHYSICAL EXAM  /74   Pulse 69   Temp 97.8 °F (36.6 °C) (Oral)   Wt 250 lb 8 oz (113.6 kg)   SpO2 95%   BMI 44.37 kg/m²   Physical Exam  Constitutional:       Appearance: Normal appearance. She is not toxic-appearing. HENT:      Head: Normocephalic. Nose: Nose normal.      Mouth/Throat:      Mouth: Mucous membranes are moist.      Pharynx: Oropharynx is clear. Eyes:      Conjunctiva/sclera: Conjunctivae normal.      Pupils: Pupils are equal, round, and reactive to light. Cardiovascular:      Rate and Rhythm: Normal rate. Pulses: Normal pulses. Pulmonary:      Effort: Pulmonary effort is normal.   Abdominal:      Palpations: Abdomen is soft. Tenderness: There is no guarding. Musculoskeletal:         General: Normal range of motion.       Cervical back: Normal range of motion and neck supple. Skin:     General: Skin is warm and dry. Capillary Refill: Capillary refill takes less than 2 seconds. Neurological:      Mental Status: She is alert and oriented to person, place, and time. ASSESSMENT/PLAN:   1. Acute cystitis with hematuria/Urinary frequency/Urge incontinence of urine  Presents today with complaints of urinary frequency, urgency and incontinence. Patient reports that symptoms worsened on Friday. Patient reports history of overactive bladder worse at nighttime. Patient reports that she takes Ditropan as directed. Patient reports that she has seen urologist several years ago due to difficulty emptying her bladder completely. Patient reports that since then that provider has retired. Patient reports that she has increased her water intake lately. Patient denies any pain with urination. No known hematuria. Urinalysis obtained in the office did reveal trace leukocytes and trace blood. Urine culture pending. Patiently placed on a course of Macrobid with strict return precautions. Recommendations for patient to follow-up with urogynecology as directed. Patient verbalized and acknowledges with plan of care at this time. - POCT Urinalysis no Micro  - Culture, Urine  - [place] - Sherry Colvin, CNP, Urogynecology, [place]  - nitrofurantoin, macrocrystal-monohydrate, (MACROBID) 100 MG capsule; Take 1 capsule by mouth 2 times daily for 10 days  Dispense: 20 capsule; Refill: 0        The note was completed using Dragon voice recognition transcription. Every effort was made to ensure accuracy; however, inadvertent  transcription errors may be present despite my best efforts to edit errors.     RUBEN Alejo Che - CNP

## 2022-11-21 NOTE — PATIENT INSTRUCTIONS
Please read the healthy family handout that you were given and share it with your family. Please compare this printed medication list with your medications at home to be sure they are the same. If you have any medications that are different please contact us immediately at [phone]. Also review your allergies that we have listed, these may also include medications that you have not been able to tolerate, make sure everything listed is correct. If you have any allergies that are different please contact us immediately at [phone]. You may receive a survey in the mail or by email asking about your experience during your visit today. Please complete and return to us so we know how we are serving you.

## 2022-11-22 LAB — URINE CULTURE, ROUTINE: NORMAL

## 2022-11-28 ENCOUNTER — OFFICE VISIT (OUTPATIENT)
Dept: UROGYNECOLOGY | Age: 62
End: 2022-11-28
Payer: MEDICARE

## 2022-11-28 VITALS
OXYGEN SATURATION: 99 % | RESPIRATION RATE: 16 BRPM | SYSTOLIC BLOOD PRESSURE: 121 MMHG | DIASTOLIC BLOOD PRESSURE: 81 MMHG | HEART RATE: 73 BPM | TEMPERATURE: 98 F

## 2022-11-28 DIAGNOSIS — N39.41 URGE INCONTINENCE: ICD-10-CM

## 2022-11-28 DIAGNOSIS — R39.15 URINARY URGENCY: ICD-10-CM

## 2022-11-28 DIAGNOSIS — F17.200 SMOKER: ICD-10-CM

## 2022-11-28 DIAGNOSIS — N90.89 VULVAR IRRITATION: ICD-10-CM

## 2022-11-28 DIAGNOSIS — R35.0 URINARY FREQUENCY: Primary | ICD-10-CM

## 2022-11-28 LAB
BILIRUBIN, POC: NORMAL
BLOOD URINE, POC: NORMAL
CLARITY, POC: CLEAR
COLOR, POC: YELLOW
EMPTY COUGH STRESS TEST: NORMAL
FIRST SENSATION: 125 CC
FULL COUGH STRESS TEST: NORMAL
GLUCOSE URINE, POC: NORMAL
KETONES, POC: NORMAL
LEUKOCYTE EST, POC: NORMAL
MAX SENSATION: 325 CC
NITRATE, URINE POC: NORMAL
NITRITE, POC: NORMAL
PH, POC: 6.5
POST VOID RESIDUAL (PVR): 40 ML
PROTEIN, POC: NORMAL
RBC URINE, POC: NORMAL
SECOND SENSATION: 160 CC
SPASM: NORMAL
SPECIFIC GRAVITY, POC: 1.01
UROBILINOGEN, POC: NORMAL
WBC URINE, POC: NORMAL

## 2022-11-28 PROCEDURE — G8484 FLU IMMUNIZE NO ADMIN: HCPCS | Performed by: NURSE PRACTITIONER

## 2022-11-28 PROCEDURE — G8417 CALC BMI ABV UP PARAM F/U: HCPCS | Performed by: NURSE PRACTITIONER

## 2022-11-28 PROCEDURE — 4004F PT TOBACCO SCREEN RCVD TLK: CPT | Performed by: NURSE PRACTITIONER

## 2022-11-28 PROCEDURE — 81002 URINALYSIS NONAUTO W/O SCOPE: CPT | Performed by: NURSE PRACTITIONER

## 2022-11-28 PROCEDURE — G8427 DOCREV CUR MEDS BY ELIG CLIN: HCPCS | Performed by: NURSE PRACTITIONER

## 2022-11-28 PROCEDURE — 99204 OFFICE O/P NEW MOD 45 MIN: CPT | Performed by: NURSE PRACTITIONER

## 2022-11-28 PROCEDURE — 3074F SYST BP LT 130 MM HG: CPT | Performed by: NURSE PRACTITIONER

## 2022-11-28 PROCEDURE — 3078F DIAST BP <80 MM HG: CPT | Performed by: NURSE PRACTITIONER

## 2022-11-28 PROCEDURE — 3017F COLORECTAL CA SCREEN DOC REV: CPT | Performed by: NURSE PRACTITIONER

## 2022-11-28 PROCEDURE — 51725 SIMPLE CYSTOMETROGRAM: CPT | Performed by: NURSE PRACTITIONER

## 2022-11-28 RX ORDER — GABAPENTIN 800 MG/1
TABLET ORAL
COMMUNITY
Start: 2022-10-31

## 2022-11-28 RX ORDER — OXYBUTYNIN CHLORIDE 10 MG/1
10 TABLET, EXTENDED RELEASE ORAL DAILY
Qty: 30 TABLET | Refills: 2 | Status: SHIPPED | OUTPATIENT
Start: 2022-11-28

## 2022-11-28 RX ORDER — NYSTATIN AND TRIAMCINOLONE ACETONIDE 100000; 1 [USP'U]/G; MG/G
OINTMENT TOPICAL
Qty: 30 G | Refills: 1 | Status: SHIPPED | OUTPATIENT
Start: 2022-11-28

## 2022-11-28 ASSESSMENT — ENCOUNTER SYMPTOMS
BACK PAIN: 1
DIARRHEA: 1

## 2022-11-28 NOTE — PROGRESS NOTES
11/28/2022      HPI:     Name: Sammi Rodriges  YOB: 1960    CC: Patient is a 60 y.o. female who is seen in consultation from RUBEN Galdamez   for evaluation of incontinence and voiding dysfunction. Recently treated for UTI with Macrobid, also feels like she had a yeast infection and self treated. Component 11/21/22 1147    Urine Culture, Routine >50,000 CFU/ml mixed skin/urogenital nicole. No further workup      Patient reports urinary frequency and urgency for about 2 years. Wears pads daily in case of inability to hold. This has happened on long car ride. Voids nightly about every hour. Daytime frequency q 2 hours  Denies history of Mariana's  Given Oxybutynin 5 mg XR which she has been taking for about 1 year with little relief. Smoker:  1ppd/50 years    One pot coffee in am  4-6 Diet cokes  Nothing other than this until 2 weeks ago when added two carcamo due to leg swelling    HPI:  Bladder control problem: Yes   How many months have you had a bladder problem? 2 years  Do you use pads to absorb lost urine? Yes. If yes how many pads do you wear a day? 3   How many trips do you make to the bathroom during the day? 15+  How many times do you wake at night to go to the bathroom? Every 2 hours at least  Do you ever wet the bed while asleep? No  Are there times when you cannot make it to the bathroom on time? Yes  Does sound, sight, or feel of running water cause you to lose urine? No  How many ounces of liquid do you consume daily? 3-4 bottles  How many drinks containing caffeine do you consume daily? 4  Which best describes urine loss: (Check all that apply)  [] I lose urine during coughing, sneezing, running, lifting  [x] I lose urine with changes in posture, standing, walking  [] I lose urine continuously such that I am constantly wet  [] I have sudden, urgent needs without the ability to make it to the bathroom  Have you seen a physician for complaints of urine loss?  Yes If yes, who? Dr. Carmel Yun?  Have you taken medication to prevent urine loss? Yes If yes, what meds? Oxybutynin 5mg   Bladder emptying problems:  Yes   How long have you had bladder emptying problems? 3 years  Do you notice any dribbling of urine when you stand after passing urine? Yes  Do you usually have difficulty starting your urine stream? Yes  Do you have to assume abnormal positions to urinate? Yes   Do you have to strain to empty your bladder? Yes  Do you feel as if your bladder is empty after passing urine? No  Is your urine flow: strong and dribbling  Prolapse/Vaginal Support problems: No  Bowel problem(s): No  Sexual History:  reports that she is not currently sexually active.   Pelvic Pain:  No   Ob/Gyn History:    OB History    Para Term  AB Living   0 0 0 0 0 0   SAB IAB Ectopic Molar Multiple Live Births   0 0 0 0 0 0     Past Medical History:   Past Medical History:   Diagnosis Date    Chest pain 11/10    negative myoview stress    Hematuria 10/2016    neg cysto, Dr Ga Pastrana    Hypertension     PONV (postoperative nausea and vomiting)     Primary osteoarthritis involving multiple joints     hands, Dr Marisela Dwyer    Routine health maintenance      TAC     Sleep apnea     did not tolerate CPAP    Smoker 2018    Tarsal tunnel syndrome      Past Surgical History:   Past Surgical History:   Procedure Laterality Date    ARTHROPLASTY Right 10/21/2019    RIGHT THUMB CARPOMETACARPAL ARTHROPLASTY -BLOCK- -SLEEP APNEA- performed by Jovita Duran MD at [address]  2004    stomach stapling    COLONOSCOPY      polyps    COLONOSCOPY  2021    COLONOSCOPY N/A 2021    COLONOSCOPY WITH BIOPSY performed by Loree Shahid MD at [address] [place] [address] [address] [place] [address] Left 14    plantar fasciotomy, tendon transfer, gastrocnemius recession/cortisone    HAND TENDON SURGERY Right 10/21/2019    FLEXOR CARPI RADIALIS TENDON INTERPOSITION performed by Davion Clemente MD [place] [facility] (CERVIX STATUS UNKNOWN)      KNEE ARTHROSCOPY      OTHER SURGICAL HISTORY  10/14/2016    cystoscopy, bilateral retrogrades, urethral dilation    URETHRAL STRICTURE DILATATION  7/14     Allergies: Allergies   Allergen Reactions    Codeine     Morphine And Related     Sulfa Antibiotics Other (See Comments)     Caused elevation of WBC  elevated WBC    Itraconazole      Swelling of feet     Current Medications:  Current Outpatient Medications   Medication Sig Dispense Refill    gabapentin (NEURONTIN) 800 MG tablet       oxybutynin (DITROPAN XL) 10 MG extended release tablet Take 1 tablet by mouth daily 30 tablet 2    nystatin-triamcinolone (MYCOLOG) 789386-7.1 UNIT/GM-% ointment Apply topically 2 times daily. 30 g 1    nitrofurantoin, macrocrystal-monohydrate, (MACROBID) 100 MG capsule Take 1 capsule by mouth 2 times daily for 10 days 20 capsule 0    rosuvastatin (CRESTOR) 10 MG tablet Take 1 tablet by mouth nightly 30 tablet 3    POTASSIUM PO Take by mouth OTC - every other day      lisinopril-hydroCHLOROthiazide (PRINZIDE;ZESTORETIC) 10-12.5 MG per tablet TAKE 1 TABLET ONCE DAILY 90 tablet 1    lidocaine (LIDODERM) 5 % Place 1 patch onto the skin daily 12 hours on, 12 hours off. 30 patch 0    oxyCODONE-acetaminophen (PERCOCET) 7.5-325 MG per tablet       Cholecalciferol (VITAMIN D3) 125 MCG (5000 UT) TABS Take by mouth      meloxicam (MOBIC) 15 MG tablet Take 15 mg by mouth daily      aspirin 81 MG tablet Take 81 mg by mouth daily      alosetron (LOTRONEX) 0.5 MG tablet Take 1 tablet by mouth 2 times daily 60 tablet 1    gabapentin (NEURONTIN) 300 MG capsule TAKE THREE CAPSULES BY MOUTH THREE TIMES A DAY (Patient not taking: Reported on 11/28/2022) 540 capsule 1     No current facility-administered medications for this visit.      Social History:   Social History     Socioeconomic History    Marital status:      Spouse name: Not on file    Number of children: Not on file    Years of education: Not on file    Highest education level: Not on file   Occupational History    Not on file   Tobacco Use    Smoking status: Every Day     Packs/day: 1.00     Years: 42.00     Pack years: 42.00     Types: Cigarettes    Smokeless tobacco: Former     Quit date: 3/2/2021   Vaping Use    Vaping Use: Never used   Substance and Sexual Activity    Alcohol use: Yes     Comment: occasionally    Drug use: No    Sexual activity: Not Currently   Other Topics Concern    Not on file   Social History Narrative    Not on file     Social Determinants of Health     Financial Resource Strain: Not on file   Food Insecurity: Not on file   Transportation Needs: Not on file   Physical Activity: Inactive    Days of Exercise per Week: 0 days    Minutes of Exercise per Session: 0 min   Stress: Not on file   Social Connections: Not on file   Intimate Partner Violence: Not on file   Housing Stability: Not on file     Family History:   Family History   Problem Relation Age of Onset    High Blood Pressure Mother     High Cholesterol Mother     Diabetes Mother     Stroke Mother     High Blood Pressure Father     High Cholesterol Father     Asthma Father     High Blood Pressure Brother     High Cholesterol Brother     Heart Disease Brother 62    Diabetes Brother      Review of Systems:  Review of Systems   Cardiovascular:  Positive for leg swelling. Gastrointestinal:  Positive for diarrhea. Genitourinary:  Positive for frequency. Musculoskeletal:  Positive for arthralgias, back pain, neck pain and neck stiffness. All other systems reviewed and are negative. Objective:     Vital Signs  Vitals:    11/28/22 1104   BP: 121/81   Pulse: 73   Resp: 16   Temp: 98 °F (36.7 °C)   SpO2: 99%     Physical Exam  Physical Exam  Vitals and nursing note reviewed. Constitutional:       Appearance: Normal appearance. HENT:      Head: Normocephalic.    Eyes:      Conjunctiva/sclera: Conjunctivae normal.   Cardiovascular: Rate and Rhythm: Normal rate. Pulmonary:      Effort: Pulmonary effort is normal.   Genitourinary:     Comments: Labia both with white, moist fungal appearance. Patient used topical monistat one day dose due to itching in past few days. Musculoskeletal:         General: Normal range of motion. Cervical back: Normal range of motion. Comments: Uses cane for stability     Skin:     General: Skin is warm and dry. Neurological:      General: No focal deficit present. Mental Status: She is alert. Psychiatric:         Mood and Affect: Mood normal.         Behavior: Behavior normal.           Office Fill Study/Urine Dip:     Using sterile technique a manometry catheter was placed. Patient's bladder was filled with sterile water by gravity. Capacity and storage volumes were measured. Spasms assessed. Catheter was removed. Stress urinary incontinence was assessed.     Results for POC orders placed in visit on 11/28/22   POCT Urinalysis no Micro   Result Value Ref Range    Color, UA yellow     Clarity, UA clear     Glucose, UA POC neg     Bilirubin, UA neg     Ketones, UA neg     Spec Grav, UA 1.015     Blood, UA POC neg     pH, UA 6.5     Protein, UA POC neg     Urobilinogen, UA neg     Leukocytes, UA neg     Nitrite, UA neg        Cystometrogram   WBC Urine, POC   Date Value Ref Range Status   11/28/2022 neg  Final     RBC, UA   Date Value Ref Range Status   06/16/2020 2 0 - 4 /HPF Final     RBC Urine, POC   Date Value Ref Range Status   11/28/2022 neg  Final     Nitrate, UA POC   Date Value Ref Range Status   11/28/2022 neg  Final     post void residual   Date Value Ref Range Status   11/28/2022 40 ml Final     FIRST SENSATION   Date Value Ref Range Status   11/28/2022 125 cc Final     SECOND SENSATION   Date Value Ref Range Status   11/28/2022 160 cc Final     MAX SENSATION   Date Value Ref Range Status   11/28/2022 325 cc Final     EMPTY COUGH STRESS TEST   Date Value Ref Range Status   11/28/2022 neg  Final     FULL COUGH STRESS TEST   Date Value Ref Range Status   11/28/2022 neg  Final     SPASM   Date Value Ref Range Status   11/28/2022 neg  Final        POPQ and Pelvic Exam:     Pelvic Organ Prolapse Quantification  Anterior Wall (Aa): -3 Anterior Wall (Ba): -3   Cervix or Cuff (C): -8     Genital Hiatus (gh): 3 Perineal Body (pb): 5   Total Vaginal Length (tvl): 10     Posterior Wall (Ap): -3 Posterior Wall (Bp): -3   Posterior Fornix (D): n/a     Oxford Scale Muscle Strength: 3/5     Assessment/Plan:     Mireya Pelaez is a 60 y.o. female with   1. Urinary frequency    2. Urinary urgency    3. Urge incontinence    4. Smoker    5. Vulvar irritation      -Simple Cystometrogram reviewed with patient: Good capacity. No leak    -Diagnosis and pathophysiology of urge incontinence reviewed with patient. A handout on urge incontinence was provided to the patient. The patient was counseled on the risk/benefits and side effects of anticholinergics including dry mouth, constipation, and the risk of falling. She denies narrow angle glaucoma. Efficacy expectations reviewed. Counseled on dietary modifications including limiting coffee, tea, soda, spicy and acidic food items. Smoking cessation encouraged. Advised of need for further testing with uro/cysto  At this time we will increase her Oxybutynin to 10 mg XR while she tries to decrease her very large amount of coffee and Diet coke    -Vulvar irritation:  Begin Mycolog cream twice daily. If no improvement at follow up, will need vulvar biopsy    The patient will follow up in 4 weeks for Urodynamic followed by med follow up and vulvar irritation follow up appointment. Cysto scheduled  Patient information sheets provided.   Ryan Loaz, APRN - CNP      Orders Placed This Encounter   Procedures    POCT Urinalysis no Micro    Cystometrogram       Orders Placed This Encounter   Medications    oxybutynin (DITROPAN XL) 10 MG extended release tablet Sig: Take 1 tablet by mouth daily     Dispense:  30 tablet     Refill:  2    nystatin-triamcinolone (MYCOLOG) 234341-7.1 UNIT/GM-% ointment     Sig: Apply topically 2 times daily.      Dispense:  30 g     Refill:  [address] [facility]

## 2022-12-09 ENCOUNTER — HOSPITAL ENCOUNTER (OUTPATIENT)
Dept: CT IMAGING | Age: 62
Discharge: HOME | End: 2022-12-09
Payer: MEDICARE

## 2022-12-09 DIAGNOSIS — Z87.891 PERSONAL HISTORY OF TOBACCO USE: ICD-10-CM

## 2022-12-09 PROCEDURE — 71271 CT THORAX LUNG CANCER SCR C-: CPT

## 2022-12-13 ENCOUNTER — OFFICE VISIT (OUTPATIENT)
Dept: FAMILY MEDICINE CLINIC | Age: 62
End: 2022-12-13
Payer: MEDICARE

## 2022-12-13 VITALS
OXYGEN SATURATION: 95 % | TEMPERATURE: 97.7 F | DIASTOLIC BLOOD PRESSURE: 68 MMHG | HEART RATE: 67 BPM | BODY MASS INDEX: 44.91 KG/M2 | SYSTOLIC BLOOD PRESSURE: 103 MMHG | WEIGHT: 253.5 LBS

## 2022-12-13 DIAGNOSIS — M54.50 LUMBAR PAIN: Primary | ICD-10-CM

## 2022-12-13 PROCEDURE — G8427 DOCREV CUR MEDS BY ELIG CLIN: HCPCS | Performed by: NURSE PRACTITIONER

## 2022-12-13 PROCEDURE — 4004F PT TOBACCO SCREEN RCVD TLK: CPT | Performed by: NURSE PRACTITIONER

## 2022-12-13 PROCEDURE — 3017F COLORECTAL CA SCREEN DOC REV: CPT | Performed by: NURSE PRACTITIONER

## 2022-12-13 PROCEDURE — 3078F DIAST BP <80 MM HG: CPT | Performed by: NURSE PRACTITIONER

## 2022-12-13 PROCEDURE — G8484 FLU IMMUNIZE NO ADMIN: HCPCS | Performed by: NURSE PRACTITIONER

## 2022-12-13 PROCEDURE — 99213 OFFICE O/P EST LOW 20 MIN: CPT | Performed by: NURSE PRACTITIONER

## 2022-12-13 PROCEDURE — G8417 CALC BMI ABV UP PARAM F/U: HCPCS | Performed by: NURSE PRACTITIONER

## 2022-12-13 PROCEDURE — 3074F SYST BP LT 130 MM HG: CPT | Performed by: NURSE PRACTITIONER

## 2022-12-13 ASSESSMENT — ENCOUNTER SYMPTOMS
EYES NEGATIVE: 1
GASTROINTESTINAL NEGATIVE: 1
BACK PAIN: 1
RESPIRATORY NEGATIVE: 1

## 2022-12-13 NOTE — PATIENT INSTRUCTIONS
Please read the healthy family handout that you were given and share it with your family. Please compare this printed medication list with your medications at home to be sure they are the same. If you have any medications that are different please contact us immediately at [phone]. Also review your allergies that we have listed, these may also include medications that you have not been able to tolerate, make sure everything listed is correct. If you have any allergies that are different please contact us immediately at [phone]. You may receive a survey in the mail or by email asking about your experience during your visit today. Please complete and return to us so we know how we are serving you.

## 2022-12-13 NOTE — PROGRESS NOTES
CHIEF COMPLAINT  Chief Complaint   Patient presents with    Other     Discuss PT  and back issues        HPI   Kayce Kendall is a 60 y.o. female who presents to the office complaining of ongoing chronic back pain that she has been seeing pain management physician . Reports that she has been having procedures to \"burned the nerves in her back. \"  Patient reports that her last treatment is tomorrow. Patient reports that after discussing her ongoing symptoms and no relief physician would like to do an additional procedure called \"mild procedure. \"  Patient unsure if symptoms will be resolved at that time. Patient would like to try physical therapy and possibly get a second opinion. No other complaints, modifying factors or associated symptoms. Nursing notes reviewed.    Past Medical History:   Diagnosis Date    Chest pain 11/10    negative myoview stress    Hematuria 10/2016    neg cysto, Dr Julien Ramirez    Hypertension     PONV (postoperative nausea and vomiting)     Primary osteoarthritis involving multiple joints     hands, Dr Manjarrez Killer    Routine health maintenance      TAC 5/13    Sleep apnea     did not tolerate CPAP    Smoker 12/4/2018    Tarsal tunnel syndrome 2014     Past Surgical History:   Procedure Laterality Date    ARTHROPLASTY Right 10/21/2019    RIGHT THUMB CARPOMETACARPAL ARTHROPLASTY -BLOCK- -SLEEP APNEA- performed by López Edwards MD [address]  2004    stomach stapling    COLONOSCOPY  2011    polyps    COLONOSCOPY  01/13/2021    COLONOSCOPY N/A 1/13/2021    COLONOSCOPY WITH BIOPSY performed by Kenya Sher MD at [address] [place] [address] [address] Left 12/9/14    plantar fasciotomy, tendon transfer, gastrocnemius recession/cortisone    HAND TENDON SURGERY Right 10/21/2019    FLEXOR CARPI RADIALIS TENDON INTERPOSITION performed by López Edwards MD at [facility] (CERVIX STATUS UNKNOWN)      KNEE ARTHROSCOPY      OTHER SURGICAL HISTORY  10/14/2016    cystoscopy, bilateral retrogrades, urethral dilation    URETHRAL STRICTURE DILATATION  7/14     Family History   Problem Relation Age of Onset    High Blood Pressure Mother     High Cholesterol Mother     Diabetes Mother     Stroke Mother     High Blood Pressure Father     High Cholesterol Father     Asthma Father     High Blood Pressure Brother     High Cholesterol Brother     Heart Disease Brother 62    Diabetes Brother      Social History     Socioeconomic History    Marital status:      Spouse name: Not on file    Number of children: Not on file    Years of education: Not on file    Highest education level: Not on file   Occupational History    Not on file   Tobacco Use    Smoking status: Every Day     Packs/day: 1.00     Years: 42.00     Pack years: 42.00     Types: Cigarettes    Smokeless tobacco: Former     Quit date: 3/2/2021   Vaping Use    Vaping Use: Never used   Substance and Sexual Activity    Alcohol use: Yes     Comment: occasionally    Drug use: No    Sexual activity: Not Currently   Other Topics Concern    Not on file   Social History Narrative    Not on file     Social Determinants of Health     Financial Resource Strain: Not on file   Food Insecurity: Not on file   Transportation Needs: Not on file   Physical Activity: Inactive    Days of Exercise per Week: 0 days    Minutes of Exercise per Session: 0 min   Stress: Not on file   Social Connections: Not on file   Intimate Partner Violence: Not on file   Housing Stability: Not on file     Current Outpatient Medications   Medication Sig Dispense Refill    gabapentin (NEURONTIN) 800 MG tablet       oxybutynin (DITROPAN XL) 10 MG extended release tablet Take 1 tablet by mouth daily 30 tablet 2    nystatin-triamcinolone (MYCOLOG) 832562-1.1 UNIT/GM-% ointment Apply topically 2 times daily.  30 g 1    rosuvastatin (CRESTOR) 10 MG tablet Take 1 tablet by mouth nightly 30 tablet 3    POTASSIUM PO Take by mouth OTC - every other day      lisinopril-hydroCHLOROthiazide (PRINZIDE;ZESTORETIC) 10-12.5 MG per tablet TAKE 1 TABLET ONCE DAILY 90 tablet 1    lidocaine (LIDODERM) 5 % Place 1 patch onto the skin daily 12 hours on, 12 hours off. 30 patch 0    oxyCODONE-acetaminophen (PERCOCET) 7.5-325 MG per tablet       Cholecalciferol (VITAMIN D3) 125 MCG (5000 UT) TABS Take by mouth      meloxicam (MOBIC) 15 MG tablet Take 15 mg by mouth daily      aspirin 81 MG tablet Take 81 mg by mouth daily      gabapentin (NEURONTIN) 300 MG capsule TAKE THREE CAPSULES BY MOUTH THREE TIMES A  capsule 1    alosetron (LOTRONEX) 0.5 MG tablet Take 1 tablet by mouth 2 times daily 60 tablet 1     No current facility-administered medications for this visit. Allergies   Allergen Reactions    Codeine     Morphine And Related     Sulfa Antibiotics Other (See Comments)     Caused elevation of WBC  elevated WBC    Itraconazole      Swelling of feet       REVIEW OF SYSTEMS  Review of Systems   Constitutional: Negative. HENT: Negative. Eyes: Negative. Respiratory: Negative. Cardiovascular: Negative. Gastrointestinal: Negative. Genitourinary: Negative. Musculoskeletal:  Positive for arthralgias and back pain. Skin: Negative. Neurological: Negative. Psychiatric/Behavioral: Negative. PHYSICAL EXAM  /68   Pulse 67   Temp 97.7 °F (36.5 °C) (Oral)   Wt 253 lb 8 oz (115 kg)   SpO2 95%   BMI 44.91 kg/m²   Physical Exam  Constitutional:       Appearance: Normal appearance. She is obese. HENT:      Head: Normocephalic. Nose: Nose normal.      Mouth/Throat:      Mouth: Mucous membranes are moist.      Pharynx: Oropharynx is clear. Eyes:      Pupils: Pupils are equal, round, and reactive to light. Cardiovascular:      Rate and Rhythm: Normal rate. Pulses: Normal pulses. Pulmonary:      Effort: Pulmonary effort is normal.   Abdominal:      Tenderness: There is no guarding.    Musculoskeletal: General: Normal range of motion. Cervical back: Normal range of motion. Skin:     General: Skin is warm and dry. Neurological:      Mental Status: She is alert and oriented to person, place, and time. ASSESSMENT/PLAN:   1. Lumbar pain  Presents today for request of referral as well as ordered for physical therapy. 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 Patient currently under treatment of  for pain. Patient reports that she has had procedure to \"burn the nerves and her last treatment is tomorrow. \"  Patient reports that she has not noticed any improvement in her symptoms. Patient would like to try physical therapy at  therefore orders were placed. We did discuss concerns associated with chronic pain as well as the physical therapy. Patient would like a second opinion as well. Patient has disc disease of L3-L4 and L4-L5. Denies any new or worsening symptoms. Patient reports that pain management physician would like to do a procedure called the mild procedure and she is unsure if that is her best option. Patient would like to speak with an additional surgeon to decide her next step in her treatment of chronic back pain. Patient denies any unilateral weakness, loss of bowel or bladder function. No current complaints of saddle paresthesias. Referral as well as orders placed. Patient acknowledges in the follow-up as directed. - External Referral To Spine Surgery  - PT eval and treat; Future       The note was completed using Dragon voice recognition transcription. Every effort was made to ensure accuracy; however, inadvertent  transcription errors may be present despite my best efforts to edit errors.     Isabel Bhagat, APRN - CNP

## 2022-12-19 DIAGNOSIS — I10 BENIGN ESSENTIAL HYPERTENSION: ICD-10-CM

## 2022-12-20 RX ORDER — LISINOPRIL AND HYDROCHLOROTHIAZIDE 12.5; 1 MG/1; MG/1
TABLET ORAL
Qty: 90 TABLET | Refills: 1 | Status: SHIPPED | OUTPATIENT
Start: 2022-12-20

## 2023-01-09 ENCOUNTER — OFFICE VISIT (OUTPATIENT)
Dept: UROGYNECOLOGY | Age: 63
End: 2023-01-09
Payer: MEDICARE

## 2023-01-09 ENCOUNTER — PROCEDURE VISIT (OUTPATIENT)
Dept: UROGYNECOLOGY | Age: 63
End: 2023-01-09
Payer: MEDICARE

## 2023-01-09 VITALS
OXYGEN SATURATION: 96 % | SYSTOLIC BLOOD PRESSURE: 139 MMHG | HEART RATE: 78 BPM | DIASTOLIC BLOOD PRESSURE: 90 MMHG | TEMPERATURE: 98 F | RESPIRATION RATE: 16 BRPM

## 2023-01-09 VITALS
RESPIRATION RATE: 16 BRPM | OXYGEN SATURATION: 99 % | HEART RATE: 75 BPM | DIASTOLIC BLOOD PRESSURE: 90 MMHG | SYSTOLIC BLOOD PRESSURE: 139 MMHG | TEMPERATURE: 98 F

## 2023-01-09 DIAGNOSIS — F17.200 SMOKER: ICD-10-CM

## 2023-01-09 DIAGNOSIS — N39.41 URGE INCONTINENCE: ICD-10-CM

## 2023-01-09 DIAGNOSIS — N90.89 VULVAR IRRITATION: ICD-10-CM

## 2023-01-09 DIAGNOSIS — R35.0 URINARY FREQUENCY: Primary | ICD-10-CM

## 2023-01-09 DIAGNOSIS — R39.15 URINARY URGENCY: ICD-10-CM

## 2023-01-09 LAB
BILIRUBIN, POC: NORMAL
BLOOD URINE, POC: NORMAL
CLARITY, POC: CLEAR
COLOR, POC: YELLOW
GLUCOSE URINE, POC: NORMAL
KETONES, POC: NORMAL
LEUKOCYTE EST, POC: NORMAL
NITRITE, POC: NORMAL
PH, POC: 6.5
PROTEIN, POC: NORMAL
SPECIFIC GRAVITY, POC: 1.01
UROBILINOGEN, POC: NORMAL

## 2023-01-09 PROCEDURE — 81002 URINALYSIS NONAUTO W/O SCOPE: CPT | Performed by: OBSTETRICS & GYNECOLOGY

## 2023-01-09 PROCEDURE — 51729 CYSTOMETROGRAM W/VP&UP: CPT | Performed by: OBSTETRICS & GYNECOLOGY

## 2023-01-09 PROCEDURE — 99213 OFFICE O/P EST LOW 20 MIN: CPT | Performed by: NURSE PRACTITIONER

## 2023-01-09 PROCEDURE — 4004F PT TOBACCO SCREEN RCVD TLK: CPT | Performed by: NURSE PRACTITIONER

## 2023-01-09 PROCEDURE — G8417 CALC BMI ABV UP PARAM F/U: HCPCS | Performed by: NURSE PRACTITIONER

## 2023-01-09 PROCEDURE — 51741 ELECTRO-UROFLOWMETRY FIRST: CPT | Performed by: OBSTETRICS & GYNECOLOGY

## 2023-01-09 PROCEDURE — 3017F COLORECTAL CA SCREEN DOC REV: CPT | Performed by: NURSE PRACTITIONER

## 2023-01-09 PROCEDURE — 3080F DIAST BP >= 90 MM HG: CPT | Performed by: NURSE PRACTITIONER

## 2023-01-09 PROCEDURE — 51797 INTRAABDOMINAL PRESSURE TEST: CPT | Performed by: OBSTETRICS & GYNECOLOGY

## 2023-01-09 PROCEDURE — 3075F SYST BP GE 130 - 139MM HG: CPT | Performed by: NURSE PRACTITIONER

## 2023-01-09 PROCEDURE — G8427 DOCREV CUR MEDS BY ELIG CLIN: HCPCS | Performed by: NURSE PRACTITIONER

## 2023-01-09 PROCEDURE — 51784 ANAL/URINARY MUSCLE STUDY: CPT | Performed by: OBSTETRICS & GYNECOLOGY

## 2023-01-09 PROCEDURE — G8484 FLU IMMUNIZE NO ADMIN: HCPCS | Performed by: NURSE PRACTITIONER

## 2023-01-09 RX ORDER — NYSTATIN AND TRIAMCINOLONE ACETONIDE 100000; 1 [USP'U]/G; MG/G
OINTMENT TOPICAL
Qty: 30 G | Refills: 1 | Status: SHIPPED | OUTPATIENT
Start: 2023-01-09

## 2023-01-09 NOTE — PROGRESS NOTES
2023       HPI:     Name: Bella Saeed  YOB: 1960    CC: Patient is a 60 y.o. presenting for evaluation of urge incontinence  and vulvar irritation . HPI: How long have you had this problem? years  Please rate the severity of your problem: moderate  Anything make it better? 22: \"At this time we will increase her Oxybutynin to 10 mg XR while she tries to decrease her very large amount of coffee and Diet coke. Vulvar irritation:  Begin Mycolog cream twice daily. If no improvement at follow up, will need vulvar biopsy\"    Feels at least 50% improvement with increase of Oxybutynin dosage and has been using cream.  Reports vulvar and vaginal itching and burning has almost completely resolved. She has done very well with decreasing her diet coke and is down to one daily.   Continues with pot of coffee in am.    Ob/Gyn History:    OB History    Para Term  AB Living   0 0 0 0 0 0   SAB IAB Ectopic Molar Multiple Live Births   0 0 0 0 0 0     Past Medical History:   Past Medical History:   Diagnosis Date    Chest pain 11/10    negative myoview stress    Hematuria 10/2016    neg cysto, Dr Quintanilla Danielson    Hypertension     PONV (postoperative nausea and vomiting)     Primary osteoarthritis involving multiple joints     hands, Dr Jerica De Luna    Routine health maintenance      TAC     Sleep apnea     did not tolerate CPAP    Smoker 2018    Tarsal tunnel syndrome      Past Surgical History:   Past Surgical History:   Procedure Laterality Date    ARTHROPLASTY Right 10/21/2019    RIGHT THUMB CARPOMETACARPAL ARTHROPLASTY -BLOCK- -SLEEP APNEA- performed by Elmo Cano MD at [address]      stomach stapling    COLONOSCOPY  2011    polyps    COLONOSCOPY  2021    COLONOSCOPY N/A 2021    COLONOSCOPY WITH BIOPSY performed by Susan Avila MD at [address] 14    plantar fasciotomy, tendon transfer, gastrocnemius recession/cortisone    HAND TENDON SURGERY Right 10/21/2019    FLEXOR CARPI RADIALIS TENDON INTERPOSITION performed by Elmo Cano MD at [facility] [address] [facility])      KNEE ARTHROSCOPY      OTHER SURGICAL HISTORY  10/14/2016    cystoscopy, bilateral retrogrades, urethral dilation    URETHRAL STRICTURE DILATATION  7/14     Allergies: Allergies   Allergen Reactions    Codeine     Morphine And Related     Sulfa Antibiotics Other (See Comments)     Caused elevation of WBC  elevated WBC    Itraconazole      Swelling of feet     Current Medications:  Current Outpatient Medications   Medication Sig Dispense Refill    lisinopril-hydroCHLOROthiazide (PRINZIDE;ZESTORETIC) 10-12.5 MG per tablet TAKE ONE (1) TABLET ONCE DAILY 90 tablet 1    gabapentin (NEURONTIN) 800 MG tablet       oxybutynin (DITROPAN XL) 10 MG extended release tablet Take 1 tablet by mouth daily 30 tablet 2    nystatin-triamcinolone (MYCOLOG) 152711-5.1 UNIT/GM-% ointment Apply topically 2 times daily. 30 g 1    rosuvastatin (CRESTOR) 10 MG tablet Take 1 tablet by mouth nightly 30 tablet 3    POTASSIUM PO Take by mouth OTC - every other day      lidocaine (LIDODERM) 5 % Place 1 patch onto the skin daily 12 hours on, 12 hours off. 30 patch 0    alosetron (LOTRONEX) 0.5 MG tablet Take 1 tablet by mouth 2 times daily 60 tablet 1    oxyCODONE-acetaminophen (PERCOCET) 7.5-325 MG per tablet       Cholecalciferol (VITAMIN D3) 125 MCG (5000 UT) TABS Take by mouth      meloxicam (MOBIC) 15 MG tablet Take 15 mg by mouth daily      aspirin 81 MG tablet Take 81 mg by mouth daily      gabapentin (NEURONTIN) 300 MG capsule TAKE THREE CAPSULES BY MOUTH THREE TIMES A  capsule 1     No current facility-administered medications for this visit.      Social History:   Social History     Socioeconomic History    Marital status:      Spouse name: Not on file    Number of children: Not on file    Years of education: Not on file    Highest education level: Not on file   Occupational History    Not on file   Tobacco Use    Smoking status: Every Day     Packs/day: 1.00     Years: 42.00     Pack years: 42.00     Types: Cigarettes    Smokeless tobacco: Former     Quit date: 3/2/2021   Vaping Use    Vaping Use: Never used   Substance and Sexual Activity    Alcohol use: Yes     Comment: occasionally    Drug use: No    Sexual activity: Not Currently   Other Topics Concern    Not on file   Social History Narrative    Not on file     Social Determinants of Health     Financial Resource Strain: Not on file   Food Insecurity: Not on file   Transportation Needs: Not on file   Physical Activity: Inactive    Days of Exercise per Week: 0 days    Minutes of Exercise per Session: 0 min   Stress: Not on file   Social Connections: Not on file   Intimate Partner Violence: Not on file   Housing Stability: Not on file     Family History:   Family History   Problem Relation Age of Onset    High Blood Pressure Mother     High Cholesterol Mother     Diabetes Mother     Stroke Mother     High Blood Pressure Father     High Cholesterol Father     Asthma Father     High Blood Pressure Brother     High Cholesterol Brother     Heart Disease Brother 62    Diabetes Brother          Objective:     Vitals  Vitals:    01/09/23 1014   BP: (!) 139/90   Pulse: 75   Resp: 16   Temp: 98 °F (36.7 °C)   SpO2: 99%     Physical Exam  Physical Exam  Vitals and nursing note reviewed. Constitutional:       Appearance: Normal appearance. HENT:      Head: Normocephalic. Eyes:      Conjunctiva/sclera: Conjunctivae normal.   Cardiovascular:      Rate and Rhythm: Normal rate. Pulmonary:      Effort: Pulmonary effort is normal.   Genitourinary:     Comments: Significant improvement of vulvar irritation. Two small patches of white skin remain on labia.   Previously entire vulva was moist with appearance of fungal infection  Musculoskeletal:         General: Normal range of motion. Cervical back: Normal range of motion. Skin:     General: Skin is warm and dry. Neurological:      General: No focal deficit present. Mental Status: She is alert. Psychiatric:         Mood and Affect: Mood normal.         Behavior: Behavior normal.       No results found for this visit on 01/09/23. Assessment/Plan:     Tom Staton is a 60 y.o. female with   1. Urinary frequency    2. Urinary urgency    3. Urge incontinence    4. Smoker    5. Vulvar irritation    -UUI:  improved with increase of dosage of Oxybutynin. She has also significantly decreased her Diet coke intake with notable improvement per patient. She will continue Oxybutynin  -Vulvar irritation:  There is much improvement in the appearance and symptoms of her vulvar irritation. She will continue the mycolog and follow up again at her cystoscopy later this month. Diana Pelaez, RUBEN - CNP      No orders of the defined types were placed in this encounter. No orders of the defined types were placed in this encounter.       Diana Pelaez, RUBEN - CNP

## 2023-01-09 NOTE — PROGRESS NOTES
Urodynamic Procedure Note    Susanna Dennis Koby  1960    Urodynamicist: Dr. Yasemin Brown    Equipment: [employer]    Brief History/Indication:    Patient is a 60 y.o. female with subjective complaints of urge incontinence for a urodynamic evaluation. Cystometrogram   WBC Urine, POC   Date Value Ref Range Status   11/28/2022 neg  Final     RBC, UA   Date Value Ref Range Status   06/16/2020 2 0 - 4 /HPF Final     RBC Urine, POC   Date Value Ref Range Status   11/28/2022 neg  Final     Nitrate, UA POC   Date Value Ref Range Status   11/28/2022 neg  Final     post void residual   Date Value Ref Range Status   11/28/2022 40 ml Final     FIRST SENSATION   Date Value Ref Range Status   11/28/2022 125 cc Final     SECOND SENSATION   Date Value Ref Range Status   11/28/2022 160 cc Final     MAX SENSATION   Date Value Ref Range Status   11/28/2022 325 cc Final     EMPTY COUGH STRESS TEST   Date Value Ref Range Status   11/28/2022 neg  Final     FULL COUGH STRESS TEST   Date Value Ref Range Status   11/28/2022 neg  Final     SPASM   Date Value Ref Range Status   11/28/2022 neg  Final       Pelvic Organ Prolapse Quantification  Anterior Wall (Aa): -3   Anterior Wall (Ba): -3   Cervix or Cuff (C): -8     Genital Hiatus (gh): 3   Perineal Body (pb): 5   Total Vaginal Length (tvl): 10     Posterior Wall (Ap): -3   Posterior Wall (Bp): -3   Posterior Fornix (D): n/a     Oxford Scale Muscle Strength: 3/5          Procedure: The Patient was taken to the Urodynamics suite and a free urine flow was obtained followed by catheterization for residual urine. A double-lumen urodynamic catheter was introduced to the bladder for measuring bladder pressure, and for filling. EMG patch electrodes were placed perianally for recording activity of the anal sphincter. A vaginal catheter was inserted to record the abdominal pressure. The entire process was displayed and analyzed using the [facility] Urodynamic machine and software.     Findings: Results for POC orders placed in visit on 01/09/23   POCT Urinalysis no Micro   Result Value Ref Range    Color, UA yellow     Clarity, UA clear     Glucose, UA POC neg     Bilirubin, UA neg     Ketones, UA neg     Spec Grav, UA 1.015     Blood, UA POC neg     pH, UA 6.5     Protein, UA POC neg     Urobilinogen, UA neg     Leukocytes, UA neg     Nitrite, UA neg        Uroflow:  Patient was able to void for Uroflow    Voided Volume: 229.5ml  PVR: 200ml  Max Flow: 19.2ml/sec with an average flow rate of 11.7ml/sec    CMG:  First sensation: 77ml  First desire: 103ml  Strong desire: 0ml  Max capacity: 267ml  Uninhibited detrusor contraction: No    Leak Point Pressures:  150ml with  negative  Sitting cough, negative  Sitting valsalva, without reduction    267ml (max) with negative  Sitting cough,  negative  Sitting valsalva, without reduction    Pressure voiding study:  Patient was able to void with catheters in place to obtain pressure voiding study    Maximum detrusor pressure at peak flow 3cm/H2O Peak flow rate 17ml/sec    Maximum abd pressure at peak flow 54cm/H2O Flow time 44sec    Maximum vesical pressure at peak flow 57cm/H2O    Voided volume 390.1ml and Residual: (Max cap-voided) ml    MUCP:  First Pull 66  Second Pull 77    EMG: does correlate    Assessment:  Clinical Diagnosis: Urinary urgency  Urge incontinence  Decreased capacity  Empties well  Smoker    Plan: Cysto  Discuss at next visit: Follow up on efficacy of Oxybutynin.   Vickie Heck MD

## 2023-01-17 ENCOUNTER — PROCEDURE VISIT (OUTPATIENT)
Dept: UROGYNECOLOGY | Age: 63
End: 2023-01-17
Payer: MEDICARE

## 2023-01-17 VITALS
OXYGEN SATURATION: 99 % | DIASTOLIC BLOOD PRESSURE: 82 MMHG | TEMPERATURE: 97.7 F | SYSTOLIC BLOOD PRESSURE: 116 MMHG | HEART RATE: 68 BPM | RESPIRATION RATE: 16 BRPM

## 2023-01-17 DIAGNOSIS — R39.15 URINARY URGENCY: ICD-10-CM

## 2023-01-17 DIAGNOSIS — T88.7XXA MEDICATION SIDE EFFECT: ICD-10-CM

## 2023-01-17 DIAGNOSIS — N39.41 URGE INCONTINENCE: ICD-10-CM

## 2023-01-17 DIAGNOSIS — F17.200 SMOKER: ICD-10-CM

## 2023-01-17 DIAGNOSIS — R35.0 URINARY FREQUENCY: Primary | ICD-10-CM

## 2023-01-17 DIAGNOSIS — R68.2 DRY MOUTH: ICD-10-CM

## 2023-01-17 PROCEDURE — 4004F PT TOBACCO SCREEN RCVD TLK: CPT | Performed by: OBSTETRICS & GYNECOLOGY

## 2023-01-17 PROCEDURE — 3017F COLORECTAL CA SCREEN DOC REV: CPT | Performed by: OBSTETRICS & GYNECOLOGY

## 2023-01-17 PROCEDURE — 99214 OFFICE O/P EST MOD 30 MIN: CPT | Performed by: OBSTETRICS & GYNECOLOGY

## 2023-01-17 PROCEDURE — G8484 FLU IMMUNIZE NO ADMIN: HCPCS | Performed by: OBSTETRICS & GYNECOLOGY

## 2023-01-17 PROCEDURE — G8427 DOCREV CUR MEDS BY ELIG CLIN: HCPCS | Performed by: OBSTETRICS & GYNECOLOGY

## 2023-01-17 PROCEDURE — 3079F DIAST BP 80-89 MM HG: CPT | Performed by: OBSTETRICS & GYNECOLOGY

## 2023-01-17 PROCEDURE — 52285 CYSTOSCOPY AND TREATMENT: CPT | Performed by: OBSTETRICS & GYNECOLOGY

## 2023-01-17 PROCEDURE — G8417 CALC BMI ABV UP PARAM F/U: HCPCS | Performed by: OBSTETRICS & GYNECOLOGY

## 2023-01-17 PROCEDURE — 3074F SYST BP LT 130 MM HG: CPT | Performed by: OBSTETRICS & GYNECOLOGY

## 2023-01-17 RX ORDER — DOXYCYCLINE 50 MG/1
50 CAPSULE ORAL DAILY
COMMUNITY
Start: 2023-01-16

## 2023-01-17 NOTE — PROGRESS NOTES
2023     HPI:     Name: Rachana Whalen  YOB: 1960    CC: Rachana Whalen is a 62 y.o. female presenting for an evaluation of  overactive bladder . Patient is on oxybutynin  HPI: How long have you had this problem?    Please rate the severity of your problem: moderate  Anything make it better? Per patient, she thinks the oxybutynin is helping somewhat.    Ob/Gyn History:    OB History    Para Term  AB Living   0 0 0 0 0 0   SAB IAB Ectopic Molar Multiple Live Births   0 0 0 0 0 0     Past Medical History:   Past Medical History:   Diagnosis Date    Chest pain 11/10    negative myoview stress    Hematuria 10/2016    neg cysto, Dr Zhou    Hypertension     PONV (postoperative nausea and vomiting)     Primary osteoarthritis involving multiple joints     hands, Dr Burt    Routine health maintenance      TAC     Sleep apnea     did not tolerate CPAP    Smoker 2018    Tarsal tunnel syndrome      Past Surgical History:   Past Surgical History:   Procedure Laterality Date    ARTHROPLASTY Right 10/21/2019    RIGHT THUMB CARPOMETACARPAL ARTHROPLASTY -BLOCK- -SLEEP APNEA- performed by Tacho Burt MD at [facility] OR    BARIATRIC SURGERY      stomach stapling    COLONOSCOPY  2011    polyps    COLONOSCOPY  2021    COLONOSCOPY N/A 2021    COLONOSCOPY WITH BIOPSY performed by Ethan Zhou MD at [facility]    FOOT TENDON SURGERY Left 14    plantar fasciotomy, tendon transfer, gastrocnemius recession/cortisone    HAND TENDON SURGERY Right 10/21/2019    FLEXOR CARPI RADIALIS TENDON INTERPOSITION performed by Tacho Burt MD at [facility] OR    HYSTERECTOMY (CERVIX STATUS UNKNOWN)      KNEE ARTHROSCOPY      OTHER SURGICAL HISTORY  10/14/2016    cystoscopy, bilateral retrogrades, urethral dilation    URETHRAL STRICTURE DILATATION       Current Medications:  Current Outpatient Medications   Medication Sig Dispense Refill    doxycycline  monohydrate (MONODOX) 50 MG capsule       mirabegron (MYRBETRIQ) 25 MG TB24 Take 1 tablet by mouth daily 30 tablet 1    nystatin-triamcinolone (MYCOLOG) 064349-9.1 UNIT/GM-% ointment Apply topically 2 times daily. 30 g 1    lisinopril-hydroCHLOROthiazide (PRINZIDE;ZESTORETIC) 10-12.5 MG per tablet TAKE ONE (1) TABLET ONCE DAILY 90 tablet 1    gabapentin (NEURONTIN) 800 MG tablet       oxybutynin (DITROPAN XL) 10 MG extended release tablet Take 1 tablet by mouth daily 30 tablet 2    rosuvastatin (CRESTOR) 10 MG tablet Take 1 tablet by mouth nightly 30 tablet 3    POTASSIUM PO Take by mouth OTC - every other day      lidocaine (LIDODERM) 5 % Place 1 patch onto the skin daily 12 hours on, 12 hours off. 30 patch 0    oxyCODONE-acetaminophen (PERCOCET) 7.5-325 MG per tablet       Cholecalciferol (VITAMIN D3) 125 MCG (5000 UT) TABS Take by mouth      meloxicam (MOBIC) 15 MG tablet Take 15 mg by mouth daily      aspirin 81 MG tablet Take 81 mg by mouth daily      alosetron (LOTRONEX) 0.5 MG tablet Take 1 tablet by mouth 2 times daily 60 tablet 1    gabapentin (NEURONTIN) 300 MG capsule TAKE THREE CAPSULES BY MOUTH THREE TIMES A DAY (Patient not taking: Reported on 1/17/2023) 540 capsule 1     No current facility-administered medications for this visit. Allergies:    Allergies   Allergen Reactions    Codeine     Morphine And Related     Sulfa Antibiotics Other (See Comments)     Caused elevation of WBC  elevated WBC    Itraconazole      Swelling of feet     Social History:   Social History     Socioeconomic History    Marital status:      Spouse name: Not on file    Number of children: Not on file    Years of education: Not on file    Highest education level: Not on file   Occupational History    Not on file   Tobacco Use    Smoking status: Every Day     Packs/day: 1.00     Years: 42.00     Pack years: 42.00     Types: Cigarettes    Smokeless tobacco: Former     Quit date: 3/2/2021   Vaping Use    Vaping Use: Never used   Substance and Sexual Activity    Alcohol use: Yes     Comment: occasionally    Drug use: No    Sexual activity: Not Currently   Other Topics Concern    Not on file   Social History Narrative    Not on file     Social Determinants of Health     Financial Resource Strain: Not on file   Food Insecurity: Not on file   Transportation Needs: Not on file   Physical Activity: Inactive    Days of Exercise per Week: 0 days    Minutes of Exercise per Session: 0 min   Stress: Not on file   Social Connections: Not on file   Intimate Partner Violence: Not on file   Housing Stability: Not on file     Family History:   Family History   Problem Relation Age of Onset    High Blood Pressure Mother     High Cholesterol Mother     Diabetes Mother     Stroke Mother     High Blood Pressure Father     High Cholesterol Father     Asthma Father     High Blood Pressure Brother     High Cholesterol Brother     Heart Disease Brother 62    Diabetes Brother          Objective:     Vital Signs  Vitals:    01/17/23 1505   BP: 116/82   Pulse: 68   Resp: 16   Temp: 97.7 °F (36.5 °C)   SpO2: 99%      Physical Exam  Physical Exam  Constitutional:       Comments: Uses a cane   HENT:      Head: Normocephalic and atraumatic. Eyes:      Conjunctiva/sclera: Conjunctivae normal.   Pulmonary:      Effort: Pulmonary effort is normal.   Abdominal:      Palpations: Abdomen is soft. Musculoskeletal:      Cervical back: Normal range of motion and neck supple. Skin:     General: Skin is warm and dry. Neurological:      Mental Status: She is alert and oriented to person, place, and time. Procedure: The urethral was anesthesized with topical lidocaine jelly and dilated up to a #14 [place]. A 0-degree urethroscope was used to examine the urethra. A 70-degree cystoscope was used to evaluate the bladder. FINDINGS:  1. Urethra was normal  2. Bladder had trabeculations mild  3. Trigone appeared Normal  4.  Ureters illustrated bilateral efflux  5. Patient exhibited spasms during the study no        No results found for this visit on 01/17/23. Assessment/Plan:     Jarrod Pablo is a 60 y.o. female with   1. Urinary frequency    2. Urinary urgency    3. Urge incontinence    4. Smoker    5. Dry mouth    6. Medication side effect    Old records reviewed, outside records reviewed    Neil Cnoti presents today as a follow-up for overactive bladder. She has significant dry mouth and some reflux most likely associated with the oxybutynin and therefore I have changed her to Myrbetriq. The oxybutynin helped slightly but she was still significantly bothered with frequency. Because of this I also talked with her about Botox injections and did have her sign the consent in the case that the Myrbetriq did not work or she had side effects from this. She does live all the way out in [facility] and its a long way to come see us. She is going to follow-up with Allyson Walker in [place] in approximately 6 weeks.     Orders Placed This Encounter   Procedures    [place] CYSTOURETHROSCOPY [place] FEMALE URETHRAL SYNDROME       Orders Placed This Encounter   Medications    mirabegron (MYRBETRIQ) 25 MG TB24     Sig: Take 1 tablet by mouth daily     Dispense:  30 tablet     Refill:  1         Blake Casarez MD

## 2023-01-17 NOTE — LETTER
[facility]  [address] [place] [address] [place] Jarrell  Phone: [phone]  Fax: [phone]    London Cárdenas MD    2023     Vic Proctor, APRN - [facility]  [address] [address]    Patient: Ene Zaidi   MR Number: [id number]   YOB: 1960   Date of Visit: 2023       Dear Vic Proctor: Thank you for referring Stephanie Rico to me for evaluation/treatment. Below are the relevant portions of my assessment and plan of care. If you have questions, please do not hesitate to call me. I look forward to following Rachana along with you. Sincerely,      London Cárdenas MD   2023     HPI:     Name: Ene Zaidi  YOB: 1960    CC: Ene Zaidi is a 60 y.o. female presenting for an evaluation of  overactive bladder . Patient is on oxybutynin  HPI: How long have you had this problem? Please rate the severity of your problem: moderate  Anything make it better? Per patient, she thinks the oxybutynin is helping somewhat.     Ob/Gyn History:    OB History    Para Term  AB Living   0 0 0 0 0 0   SAB IAB Ectopic Molar Multiple Live Births   0 0 0 0 0 0     Past Medical History:   Past Medical History:   Diagnosis Date    Chest pain 11/10    negative myoview stress    Hematuria 10/2016    neg cystoDr Tyson Mannie    Hypertension     PONV (postoperative nausea and vomiting)     Primary osteoarthritis involving multiple joints     hands, Dr Dmitriy Christian Routine health maintenance      TAC     Sleep apnea     did not tolerate CPAP    Smoker 2018    Tarsal tunnel syndrome      Past Surgical History:   Past Surgical History:   Procedure Laterality Date    ARTHROPLASTY Right 10/21/2019    RIGHT THUMB CARPOMETACARPAL ARTHROPLASTY -BLOCK- -SLEEP APNEA- performed by Lluvia Thakur MD at [facility] SURGERY      stomach stapling    COLONOSCOPY   polyps    COLONOSCOPY  01/13/2021    COLONOSCOPY N/A 1/13/2021    COLONOSCOPY WITH BIOPSY performed by Bob Harding MD at [facility] Left 12/9/14    plantar fasciotomy, tendon transfer, gastrocnemius recession/cortisone    HAND TENDON SURGERY Right 10/21/2019    FLEXOR CARPI RADIALIS TENDON INTERPOSITION performed by Irish Scruggs MD at [address] (CERVIX STATUS UNKNOWN)      KNEE ARTHROSCOPY      OTHER SURGICAL HISTORY  10/14/2016    cystoscopy, bilateral retrogrades, urethral dilation    URETHRAL STRICTURE DILATATION  7/14     Current Medications:  Current Outpatient Medications   Medication Sig Dispense Refill    doxycycline monohydrate (MONODOX) 50 MG capsule       mirabegron (MYRBETRIQ) 25 MG TB24 Take 1 tablet by mouth daily 30 tablet 1    nystatin-triamcinolone (MYCOLOG) 747446-6.1 UNIT/GM-% ointment Apply topically 2 times daily. 30 g 1    lisinopril-hydroCHLOROthiazide (PRINZIDE;ZESTORETIC) 10-12.5 MG per tablet TAKE ONE (1) TABLET ONCE DAILY 90 tablet 1    gabapentin (NEURONTIN) 800 MG tablet       oxybutynin (DITROPAN XL) 10 MG extended release tablet Take 1 tablet by mouth daily 30 tablet 2    rosuvastatin (CRESTOR) 10 MG tablet Take 1 tablet by mouth nightly 30 tablet 3    POTASSIUM PO Take by mouth OTC - every other day      lidocaine (LIDODERM) 5 % Place 1 patch onto the skin daily 12 hours on, 12 hours off.  30 patch 0    oxyCODONE-acetaminophen (PERCOCET) 7.5-325 MG per tablet       Cholecalciferol (VITAMIN D3) 125 MCG (5000 UT) TABS Take by mouth      meloxicam (MOBIC) 15 MG tablet Take 15 mg by mouth daily      aspirin 81 MG tablet Take 81 mg by mouth daily      alosetron (LOTRONEX) 0.5 MG tablet Take 1 tablet by mouth 2 times daily 60 tablet 1    gabapentin (NEURONTIN) 300 MG capsule TAKE THREE CAPSULES BY MOUTH THREE TIMES A DAY (Patient not taking: Reported on 1/17/2023) 540 capsule 1     No current facility-administered medications for this visit. Allergies:    Allergies   Allergen Reactions    Codeine     Morphine And Related     Sulfa Antibiotics Other (See Comments)     Caused elevation of WBC  elevated WBC    Itraconazole      Swelling of feet     Social History:   Social History     Socioeconomic History    Marital status:      Spouse name: Not on file    Number of children: Not on file    Years of education: Not on file    Highest education level: Not on file   Occupational History    Not on file   Tobacco Use    Smoking status: Every Day     Packs/day: 1.00     Years: 42.00     Pack years: 42.00     Types: Cigarettes    Smokeless tobacco: Former     Quit date: 3/2/2021   Vaping Use    Vaping Use: Never used   Substance and Sexual Activity    Alcohol use: Yes     Comment: occasionally    Drug use: No    Sexual activity: Not Currently   Other Topics Concern    Not on file   Social History Narrative    Not on file     Social Determinants of Health     Financial Resource Strain: Not on file   Food Insecurity: Not on file   Transportation Needs: Not on file   Physical Activity: Inactive    Days of Exercise per Week: 0 days   [employer] of Exercise per Session: 0 min   Stress: Not on file   Social Connections: Not on file   Intimate Partner Violence: Not on file   Housing Stability: Not on file     Family History:   Family History   Problem Relation Age of Onset    High Blood Pressure Mother     High Cholesterol Mother     Diabetes Mother     Stroke Mother     High Blood Pressure Father     High Cholesterol Father     Asthma Father     High Blood Pressure Brother     High Cholesterol Brother     Heart Disease Brother 62    Diabetes Brother          Objective:     Vital Signs  Vitals:    01/17/23 1505   BP: 116/82   Pulse: 68   Resp: 16   Temp: 97.7 °F (36.5 °C)   SpO2: 99%      Physical Exam  Physical Exam  Constitutional:       Comments: Uses a cane   HENT:      Head: Normocephalic and atraumatic. Eyes:      Conjunctiva/sclera: Conjunctivae normal.   Pulmonary:      Effort: Pulmonary effort is normal.   Abdominal:      Palpations: Abdomen is soft. Musculoskeletal:      Cervical back: Normal range of motion and neck supple. Skin:     General: Skin is warm and dry. Neurological:      Mental Status: She is alert and oriented to person, place, and time. Procedure: The urethral was anesthesized with topical lidocaine jelly and dilated up to a #14 [place]. A 0-degree urethroscope was used to examine the urethra. A 70-degree cystoscope was used to evaluate the bladder. FINDINGS:  1. Urethra was normal  2. Bladder had trabeculations mild  3. Trigone appeared Normal  4. Ureters illustrated bilateral efflux  5. Patient exhibited spasms during the study no        No results found for this visit on 01/17/23. Assessment/Plan:     Lianna Doyle is a 60 y.o. female with   1. Urinary frequency    2. Urinary urgency    3. Urge incontinence    4. Smoker    5. Dry mouth    6. Medication side effect    Old records reviewed, outside records reviewed    [place] Other presents today as a follow-up for overactive bladder. She has significant dry mouth and some reflux most likely associated with the oxybutynin and therefore I have changed her to Myrbetriq. The oxybutynin helped slightly but she was still significantly bothered with frequency. Because of this I also talked with her about Botox injections and did have her sign the consent in the case that the Myrbetriq did not work or she had side effects from this. She does live all the way out in [facility] and its a long way to come see us. She is going to follow-up with Jacinto Luna in [place] in approximately 6 weeks.     Orders Placed This Encounter   Procedures    OR CYSTOURETHROSCOPY TX FEMALE URETHRAL SYNDROME       Orders Placed This Encounter   Medications    mirabegron (MYRBETRIQ) 25 MG TB24     Sig: Take 1 tablet by mouth daily Dispense:  30 tablet     Refill:  1         Rylie Dozier MD

## 2023-01-19 ENCOUNTER — OFFICE VISIT (OUTPATIENT)
Dept: FAMILY MEDICINE CLINIC | Age: 63
End: 2023-01-19

## 2023-01-19 VITALS
HEART RATE: 65 BPM | SYSTOLIC BLOOD PRESSURE: 101 MMHG | WEIGHT: 256.5 LBS | OXYGEN SATURATION: 94 % | TEMPERATURE: 98 F | BODY MASS INDEX: 45.44 KG/M2 | DIASTOLIC BLOOD PRESSURE: 65 MMHG

## 2023-01-19 DIAGNOSIS — B07.0 PLANTAR WART: Primary | ICD-10-CM

## 2023-01-19 RX ORDER — CLINDAMYCIN PHOSPHATE 10 UG/ML
LOTION TOPICAL 2 TIMES DAILY
COMMUNITY

## 2023-01-19 ASSESSMENT — ENCOUNTER SYMPTOMS
EYES NEGATIVE: 1
RESPIRATORY NEGATIVE: 1
GASTROINTESTINAL NEGATIVE: 1

## 2023-01-19 ASSESSMENT — PATIENT HEALTH QUESTIONNAIRE - PHQ9
2. FEELING DOWN, DEPRESSED OR HOPELESS: 0
SUM OF ALL RESPONSES TO PHQ9 QUESTIONS 1 & 2: 0
SUM OF ALL RESPONSES TO PHQ QUESTIONS 1-9: 0
SUM OF ALL RESPONSES TO PHQ QUESTIONS 1-9: 0
1. LITTLE INTEREST OR PLEASURE IN DOING THINGS: 0
SUM OF ALL RESPONSES TO PHQ QUESTIONS 1-9: 0
SUM OF ALL RESPONSES TO PHQ QUESTIONS 1-9: 0

## 2023-01-19 NOTE — PROGRESS NOTES
CHIEF COMPLAINT  Chief Complaint   Patient presents with    Other     Wart on left foot        HPI   Yenifer De La Cruz is a 60 y.o. female who presents to the office complaining of her left foot. Patient reports has been there for quite some time. Patient reports trying over-the-counter measures with no relief. Patient would like removal of the wart today. No other complaints, modifying factors or associated symptoms. Nursing notes reviewed.    Past Medical History:   Diagnosis Date    Chest pain 11/10    negative myoview stress    Hematuria 10/2016    neg cysto, Dr Jade [place]    Hypertension     PONV (postoperative nausea and vomiting)     Primary osteoarthritis involving multiple joints     hands, Dr Delia Gomez    Routine health maintenance      TAC 5/13    Sleep apnea     did not tolerate CPAP    Smoker 12/4/2018    Tarsal tunnel syndrome 2014     Past Surgical History:   Procedure Laterality Date    ARTHROPLASTY Right 10/21/2019    RIGHT THUMB CARPOMETACARPAL ARTHROPLASTY -BLOCK- -SLEEP APNEA- performed by Leila Acosta MD at [address]  2004    stomach stapling    COLONOSCOPY  2011    polyps    COLONOSCOPY  01/13/2021    COLONOSCOPY N/A 1/13/2021    COLONOSCOPY WITH BIOPSY performed by Josseline Fowler MD at [address] [place] [address] [address] Left 12/9/14    plantar fasciotomy, tendon transfer, gastrocnemius recession/cortisone    HAND TENDON SURGERY Right 10/21/2019    FLEXOR CARPI RADIALIS TENDON INTERPOSITION performed by Le[facility] MD at [facility] (CERVIX STATUS UNKNOWN)      KNEE ARTHROSCOPY      OTHER SURGICAL HISTORY  10/14/2016    cystoscopy, bilateral retrogrades, urethral dilation    URETHRAL STRICTURE DILATATION  7/14     Family History   Problem Relation Age of Onset    High Blood Pressure Mother     High Cholesterol Mother     Diabetes Mother     Stroke Mother     High Blood Pressure Father     High Cholesterol Father     Asthma Father     High Blood Pressure Brother     High Cholesterol Brother     Heart Disease Brother 62    Diabetes Brother      Social History     Socioeconomic History    Marital status:      Spouse name: Not on file    Number of children: Not on file    Years of education: Not on file    Highest education level: Not on file   Occupational History    Not on file   Tobacco Use    Smoking status: Every Day     Packs/day: 1.00     Years: 42.00     Pack years: 42.00     Types: Cigarettes    Smokeless tobacco: Former     Quit date: 3/2/2021   Vaping Use    Vaping Use: Never used   Substance and Sexual Activity    Alcohol use: Yes     Comment: occasionally    Drug use: No    Sexual activity: Not Currently   Other Topics Concern    Not on file   Social History Narrative    Not on file     Social Determinants of Health     Financial Resource Strain: Not on file   Food Insecurity: Not on file   Transportation Needs: Not on file   Physical Activity: Inactive    Days of Exercise per Week: 0 days    Minutes of Exercise per Session: 0 min   Stress: Not on file   Social Connections: Not on file   Intimate Partner Violence: Not on file   Housing Stability: Not on file     Current Outpatient Medications   Medication Sig Dispense Refill    clindamycin (CLEOCIN T) 1 % lotion Apply topically 2 times daily Apply topically 2 times daily. mupirocin (BACTROBAN) 2 % ointment Apply topically 2 times daily Apply topically 3 times daily. Benzoyl Peroxide (PANOXYL-4 CREAMY WASH EX) Apply topically      doxycycline monohydrate (MONODOX) 50 MG capsule Take 50 mg by mouth daily      mirabegron (MYRBETRIQ) 25 MG TB24 Take 1 tablet by mouth daily 30 tablet 1    nystatin-triamcinolone (MYCOLOG) 516601-9.1 UNIT/GM-% ointment Apply topically 2 times daily.  30 g 1    lisinopril-hydroCHLOROthiazide (PRINZIDE;ZESTORETIC) 10-12.5 MG per tablet TAKE ONE (1) TABLET ONCE DAILY 90 tablet 1    gabapentin (NEURONTIN) 800 MG tablet Take 800 mg by mouth 3 times daily. rosuvastatin (CRESTOR) 10 MG tablet Take 1 tablet by mouth nightly 30 tablet 3    POTASSIUM PO Take by mouth OTC - every other day      lidocaine (LIDODERM) 5 % Place 1 patch onto the skin daily 12 hours on, 12 hours off. 30 patch 0    oxyCODONE-acetaminophen (PERCOCET) 7.5-325 MG per tablet       Cholecalciferol (VITAMIN D3) 125 MCG (5000 UT) TABS Take by mouth      meloxicam (MOBIC) 15 MG tablet Take 15 mg by mouth daily      aspirin 81 MG tablet Take 81 mg by mouth daily      alosetron (LOTRONEX) 0.5 MG tablet Take 1 tablet by mouth 2 times daily 60 tablet 1     No current facility-administered medications for this visit. Allergies   Allergen Reactions    Codeine     Morphine And Related     Oxybutynin     Sulfa Antibiotics Other (See Comments)     Caused elevation of WBC  elevated WBC    Itraconazole      Swelling of feet       REVIEW OF SYSTEMS  Review of Systems   Constitutional: Negative. HENT: Negative. Eyes: Negative. Respiratory: Negative. Cardiovascular: Negative. Gastrointestinal: Negative. Genitourinary: Negative. Musculoskeletal: Negative. Skin:         wart on the bottom of her left foot. Neurological: Negative. Psychiatric/Behavioral: Negative. PHYSICAL EXAM  /65   Pulse 65   Temp 98 °F (36.7 °C) (Oral)   Wt 256 lb 8 oz (116.3 kg)   SpO2 94%   BMI 45.44 kg/m²   Physical Exam  Constitutional:       Appearance: Normal appearance. She is not toxic-appearing. HENT:      Head: Normocephalic. Nose: Nose normal.      Mouth/Throat:      Mouth: Mucous membranes are moist.      Pharynx: Oropharynx is clear. Cardiovascular:      Rate and Rhythm: Normal rate. Pulses: Normal pulses. Pulmonary:      Effort: Pulmonary effort is normal.   Musculoskeletal:         General: Normal range of motion. Cervical back: Normal range of motion.         Feet:    Feet:      Comments: Wart noted to plantar aspect of left foot  Skin:     General: Skin is warm and dry. Capillary Refill: Capillary refill takes 2 to 3 seconds. Neurological:      Mental Status: She is alert and oriented to person, place, and time. ASSESSMENT/PLAN:   1. Plantar wart  The patient complains of wart on the bottom of her left foot distal to fifth toe, present unknown amount of time exactly. The treatments, side effects and failure rates are discussed. Consent obtained. Liquid nitrogen was applied to each wart. The expected skin reaction including erythema, pain, scabbing, blistering and hypopigmented scar formation was discussed. See at intervals until warts resolved. - [id number] - [place] DESTRUCTION BENIGN LESIONS UP TO 14           The note was completed using Dragon voice recognition transcription. Every effort was made to ensure accuracy; however, inadvertent  transcription errors may be present despite my best efforts to edit errors.     RUBEN Chavez - CNP

## 2023-01-19 NOTE — PATIENT INSTRUCTIONS
Please read the healthy family handout that you were given and share it with your family. Please compare this printed medication list with your medications at home to be sure they are the same. If you have any medications that are different please contact us immediately at [phone]. Also review your allergies that we have listed, these may also include medications that you have not been able to tolerate, make sure everything listed is correct. If you have any allergies that are different please contact us immediately at [phone]. You may receive a survey in the mail or by email asking about your experience during your visit today. Please complete and return to us so we know how we are serving you.

## 2023-02-09 ENCOUNTER — HOSPITAL ENCOUNTER (OUTPATIENT)
Dept: MAMMOGRAPHY | Age: 63
Discharge: HOME | End: 2023-02-09
Payer: MEDICARE

## 2023-02-09 VITALS — BODY MASS INDEX: 44.3 KG/M2 | WEIGHT: 250 LBS | HEIGHT: 63 IN

## 2023-02-09 DIAGNOSIS — Z12.31 VISIT FOR SCREENING MAMMOGRAM: ICD-10-CM

## 2023-02-09 PROCEDURE — 77067 SCR MAMMO BI INCL CAD: CPT

## 2023-02-14 ENCOUNTER — TELEPHONE (OUTPATIENT)
Dept: MAMMOGRAPHY | Age: 63
End: 2023-02-14

## 2023-02-14 ENCOUNTER — OFFICE VISIT (OUTPATIENT)
Dept: FAMILY MEDICINE CLINIC | Age: 63
End: 2023-02-14

## 2023-02-14 VITALS
TEMPERATURE: 97.8 F | DIASTOLIC BLOOD PRESSURE: 58 MMHG | OXYGEN SATURATION: 97 % | HEART RATE: 71 BPM | WEIGHT: 262.13 LBS | SYSTOLIC BLOOD PRESSURE: 113 MMHG | BODY MASS INDEX: 46.43 KG/M2

## 2023-02-14 DIAGNOSIS — B07.0 PLANTAR WART: Primary | ICD-10-CM

## 2023-02-14 DIAGNOSIS — R92.8 ABNORMAL MAMMOGRAM: Primary | ICD-10-CM

## 2023-02-14 DIAGNOSIS — R92.8 ABNORMAL MAMMOGRAM: ICD-10-CM

## 2023-02-14 ASSESSMENT — ENCOUNTER SYMPTOMS
GASTROINTESTINAL NEGATIVE: 1
EYES NEGATIVE: 1
RESPIRATORY NEGATIVE: 1

## 2023-02-14 NOTE — PATIENT INSTRUCTIONS
Please read the healthy family handout that you were given and share it with your family. Please compare this printed medication list with your medications at home to be sure they are the same. If you have any medications that are different please contact us immediately at [phone]. Also review your allergies that we have listed, these may also include medications that you have not been able to tolerate, make sure everything listed is correct. If you have any allergies that are different please contact us immediately at [phone]. You may receive a survey in the mail or by email asking about your experience during your visit today. Please complete and return to us so we know how we are serving you.

## 2023-02-14 NOTE — PROGRESS NOTES
CHIEF COMPLAINT  Chief Complaint   Patient presents with    1 Month Follow-Up     Foot problem        HPI   Puneet Laughlin is a 60 y.o. female who presents to the office follow-up in regards to plantar wart on the bottom of her left foot. Patient was seen here approximately 4 weeks ago where liquid nitrogen was applied. Patient does not report any improvement. Patient reports that because the area on her foot it is difficult to see. Patient denies any increase in pain, swelling or redness. No other complaints, modifying factors or associated symptoms. Nursing notes reviewed.    Past Medical History:   Diagnosis Date    Chest pain 11/10    negative myoview stress    Hematuria 10/2016    neg cysto, Dr Lucio Baidoris    Hypertension     PONV (postoperative nausea and vomiting)     Primary osteoarthritis involving multiple joints     hands, Dr Isacc Kilpatrick    Routine health maintenance      TAC 5/13    Sleep apnea     did not tolerate CPAP    Smoker 12/4/2018    Tarsal tunnel syndrome 2014     Past Surgical History:   Procedure Laterality Date    ARTHROPLASTY Right 10/21/2019    RIGHT THUMB CARPOMETACARPAL ARTHROPLASTY -BLOCK- -SLEEP APNEA- performed by Cliff Salazar MD at [address]  2004    stomach stapling    COLONOSCOPY  2011    polyps    COLONOSCOPY  01/13/2021    COLONOSCOPY N/A 1/13/2021    COLONOSCOPY WITH BIOPSY performed by Nav Guevara MD at [address] [facility] [address] Left 12/9/14    plantar fasciotomy, tendon transfer, gastrocnemius recession/cortisone    HAND TENDON SURGERY Right 10/21/2019    FLEXOR CARPI RADIALIS TENDON INTERPOSITION performed by Cliff Salazar MD at [facility] ([address] [place] [address])      KNEE ARTHROSCOPY      OTHER SURGICAL HISTORY  10/14/2016    cystoscopy, bilateral retrogrades, urethral dilation    URETHRAL STRICTURE DILATATION  7/14     Family History   Problem Relation Age of Onset    High Blood Pressure Mother     High Cholesterol Mother     Diabetes Mother     Stroke Mother     High Blood Pressure Father     High Cholesterol Father     Asthma Father     High Blood Pressure Brother     High Cholesterol Brother     Heart Disease Brother 62    Diabetes Brother      Social History     Socioeconomic History    Marital status:      Spouse name: Not on file    Number of children: Not on file    Years of education: Not on file    Highest education level: Not on file   Occupational History    Not on file   Tobacco Use    Smoking status: Every Day     Packs/day: 1.00     Years: 42.00     Pack years: 42.00     Types: Cigarettes    Smokeless tobacco: Former     Quit date: 3/2/2021   Vaping Use    Vaping Use: Never used   Substance and Sexual Activity    Alcohol use: Yes     Comment: occasionally    Drug use: No    Sexual activity: Not Currently   Other Topics Concern    Not on file   Social History Narrative    Not on file     Social Determinants of Health     Financial Resource Strain: Not on file   Food Insecurity: Not on file   Transportation Needs: Not on file   Physical Activity: Inactive    Days of Exercise per Week: 0 days    Minutes of Exercise per Session: 0 min   Stress: Not on file   Social Connections: Not on file   Intimate Partner Violence: Not on file   Housing Stability: Not on file     Current Outpatient Medications   Medication Sig Dispense Refill    clindamycin (CLEOCIN T) 1 % lotion Apply topically 2 times daily Apply topically 2 times daily. mupirocin (BACTROBAN) 2 % ointment Apply topically 2 times daily Apply topically 3 times daily. Benzoyl Peroxide (PANOXYL-4 CREAMY WASH EX) Apply topically      doxycycline monohydrate (MONODOX) 50 MG capsule Take 50 mg by mouth daily      mirabegron (MYRBETRIQ) 25 MG TB24 Take 1 tablet by mouth daily 30 tablet 1    nystatin-triamcinolone (MYCOLOG) 104433-7.1 UNIT/GM-% ointment Apply topically 2 times daily.  30 g 1    lisinopril-hydroCHLOROthiazide (PRINZIDE;ZESTORETIC) 10-12.5 MG per tablet TAKE ONE (1) TABLET ONCE DAILY 90 tablet 1    gabapentin (NEURONTIN) 800 MG tablet Take 800 mg by mouth 3 times daily. rosuvastatin (CRESTOR) 10 MG tablet Take 1 tablet by mouth nightly 30 tablet 3    POTASSIUM PO Take by mouth OTC - every other day      lidocaine (LIDODERM) 5 % Place 1 patch onto the skin daily 12 hours on, 12 hours off. 30 patch 0    oxyCODONE-acetaminophen (PERCOCET) 7.5-325 MG per tablet       Cholecalciferol (VITAMIN D3) 125 MCG (5000 UT) TABS Take by mouth      meloxicam (MOBIC) 15 MG tablet Take 15 mg by mouth daily      aspirin 81 MG tablet Take 81 mg by mouth daily      alosetron (LOTRONEX) 0.5 MG tablet Take 1 tablet by mouth 2 times daily 60 tablet 1     No current facility-administered medications for this visit. Allergies   Allergen Reactions    Codeine     Morphine And Related     Oxybutynin     Sulfa Antibiotics Other (See Comments)     Caused elevation of WBC  elevated WBC    Itraconazole      Swelling of feet       REVIEW OF SYSTEMS  Review of Systems   Constitutional: Negative. HENT: Negative. Eyes: Negative. Respiratory: Negative. Cardiovascular: Negative. Gastrointestinal: Negative. Genitourinary: Negative. Musculoskeletal: Negative. Skin:         wart on the bottom of her left foot. Neurological: Negative. Psychiatric/Behavioral: Negative. PHYSICAL EXAM  BP (!) 113/58   Pulse 71   Temp 97.8 °F (36.6 °C) (Oral)   Wt 262 lb 2 oz (118.9 kg)   SpO2 97%   BMI 46.43 kg/m²   Physical Exam  Constitutional:       Appearance: Normal appearance. She is not toxic-appearing. HENT:      Head: Normocephalic. Nose: Nose normal.      Mouth/Throat:      Mouth: Mucous membranes are moist.      Pharynx: Oropharynx is clear. Cardiovascular:      Rate and Rhythm: Normal rate. Pulses: Normal pulses.    Pulmonary:      Effort: Pulmonary effort is normal.   Musculoskeletal:         General: Normal range of motion. Cervical back: Normal range of motion. Feet:    Feet:      Comments: Wart noted to plantar aspect of left foot  Skin:     General: Skin is warm and dry. Capillary Refill: Capillary refill takes 2 to 3 seconds. Neurological:      Mental Status: She is alert and oriented to person, place, and time. ASSESSMENT/PLAN:   1. Plantar wart  The patient complains of wart on the bottom of her left foot distal to fifth toe, present unknown amount of time exactly. Patient Was seen here on 1/19 where liquid nitrogen was applied. Patient denies any improvement however due to location is difficult to see. Patient would like repeat treatment at today's visit. The treatments, side effects and failure rates are discussed. Consent obtained. Liquid nitrogen was applied to each wart. The expected skin reaction including erythema, pain, scabbing, blistering and hypopigmented scar formation was discussed. See at intervals until warts resolved. - 71993 - [place] DESTRUCTION BENIGN LESIONS UP TO 14    2. Abnormal mammogram  Patient recently had screening mammogram performed which noted some abnormalities. Patient was instructed on getting diagnostic mammogram as well as ultrasound of her right breast.  Patient aware and will call today for scheduling. I did go ahead and give patient a referral to a breast surgeon while at today's visit. Patient verbalized and acknowledges.  - DAVID - Suzanne Moreira DO, Breast Surgery, formerly [facility]     The note was completed using Dragon voice recognition transcription. Every effort was made to ensure accuracy; however, inadvertent  transcription errors may be present despite my best efforts to edit errors.     RUBEN Pierce - CNP

## 2023-02-14 NOTE — TELEPHONE ENCOUNTER
LM with patient in regards to radiologist's recommendation for follow up to screening mammogram.  Given scheduling and mammo #

## 2023-02-21 ENCOUNTER — HOSPITAL ENCOUNTER (OUTPATIENT)
Dept: WOMENS IMAGING | Age: 63
Discharge: HOME | End: 2023-02-21
Payer: MEDICARE

## 2023-02-21 DIAGNOSIS — R92.8 ABNORMAL MAMMOGRAM: ICD-10-CM

## 2023-02-21 PROCEDURE — 77065 DX MAMMO INCL CAD UNI: CPT

## 2023-02-21 PROCEDURE — 76642 ULTRASOUND BREAST LIMITED: CPT

## 2023-02-24 LAB
GRAM STAIN RESULT: ABNORMAL
WOUND/ABSCESS: ABNORMAL

## 2023-03-13 DIAGNOSIS — E78.00 PURE HYPERCHOLESTEROLEMIA: ICD-10-CM

## 2023-03-13 RX ORDER — ROSUVASTATIN CALCIUM 10 MG/1
TABLET, COATED ORAL
Qty: 30 TABLET | Refills: 3 | Status: SHIPPED | OUTPATIENT
Start: 2023-03-13

## 2023-03-13 RX ORDER — MIRABEGRON 25 MG/1
TABLET, FILM COATED, EXTENDED RELEASE ORAL
Qty: 30 TABLET | Refills: 1 | Status: SHIPPED | OUTPATIENT
Start: 2023-03-13

## 2023-03-22 DIAGNOSIS — I10 BENIGN ESSENTIAL HYPERTENSION: ICD-10-CM

## 2023-03-22 RX ORDER — LISINOPRIL AND HYDROCHLOROTHIAZIDE 12.5; 1 MG/1; MG/1
TABLET ORAL
Qty: 90 TABLET | Refills: 1 | OUTPATIENT
Start: 2023-03-22

## 2023-04-07 ENCOUNTER — OFFICE VISIT (OUTPATIENT)
Dept: UROGYNECOLOGY | Age: 63
End: 2023-04-07

## 2023-04-07 VITALS
TEMPERATURE: 98.7 F | OXYGEN SATURATION: 99 % | SYSTOLIC BLOOD PRESSURE: 130 MMHG | RESPIRATION RATE: 16 BRPM | DIASTOLIC BLOOD PRESSURE: 75 MMHG | HEART RATE: 71 BPM

## 2023-04-07 DIAGNOSIS — N39.41 URGE INCONTINENCE: ICD-10-CM

## 2023-04-07 DIAGNOSIS — R39.15 URINARY URGENCY: ICD-10-CM

## 2023-04-07 DIAGNOSIS — R35.0 URINARY FREQUENCY: Primary | ICD-10-CM

## 2023-04-07 NOTE — PROGRESS NOTES
present. Mental Status: She is alert. Psychiatric:         Mood and Affect: Mood normal.         Behavior: Behavior normal.       No results found for this visit on 04/07/23. Assessment/Plan:     Lawyer Mejia is a 60 y.o. female with   1. Urinary frequency    2. Urinary urgency    3. Urge incontinence      -UUI:  Has had improvement with medication however side effects and cost are prohibitory   She has been consented for Botox and has had normal cystoscopy  All questions answered    Refill provided of Myrbetriq while awaiting Botox    -Schedule Botox and 2 week PVR  Hammad Pham, APRN - CNP    No orders of the defined types were placed in this encounter.     Orders Placed This Encounter   Medications    mirabegron (MYRBETRIQ) 25 MG TB24     Sig: TAKE ONE (1) TABLET BY MOUTH DAILY     Dispense:  30 tablet     Refill:  [address], APRN - CNP

## 2023-05-12 DIAGNOSIS — F41.9 ANXIETY: Primary | ICD-10-CM

## 2023-05-12 RX ORDER — NITROFURANTOIN 25; 75 MG/1; MG/1
100 CAPSULE ORAL 2 TIMES DAILY
Qty: 14 CAPSULE | Refills: 0 | Status: SHIPPED | OUTPATIENT
Start: 2023-05-12 | End: 2023-05-19

## 2023-05-12 RX ORDER — DIAZEPAM 5 MG/1
5 TABLET ORAL ONCE
Qty: 1 TABLET | Refills: 0 | OUTPATIENT
Start: 2023-05-12 | End: 2023-05-12

## 2023-05-12 RX ORDER — IBUPROFEN 600 MG/1
600 TABLET ORAL ONCE
Qty: 1 TABLET | Refills: 0 | Status: SHIPPED | OUTPATIENT
Start: 2023-05-12 | End: 2023-05-12

## 2023-05-12 NOTE — PROGRESS NOTES
Spoke with patient over the phone. Sent in Brady Serge 100 mg twice a day for 7 days to start 3 days prior to procedure. Sent in one 600 mg tablet of ibuprofen to take 1 hour prior. Called in one 5 mg tablet of valium. Advised patient she will need a . Patient states her  will bring her. If not, she will not take the valium. No other concerns at this time.

## 2023-05-16 ENCOUNTER — PROCEDURE VISIT (OUTPATIENT)
Dept: UROGYNECOLOGY | Age: 63
End: 2023-05-16
Payer: MEDICARE

## 2023-05-16 VITALS
HEART RATE: 65 BPM | DIASTOLIC BLOOD PRESSURE: 83 MMHG | TEMPERATURE: 97.8 F | OXYGEN SATURATION: 98 % | SYSTOLIC BLOOD PRESSURE: 116 MMHG | RESPIRATION RATE: 18 BRPM

## 2023-05-16 DIAGNOSIS — R35.0 URINARY FREQUENCY: Primary | ICD-10-CM

## 2023-05-16 DIAGNOSIS — R39.15 URINARY URGENCY: ICD-10-CM

## 2023-05-16 DIAGNOSIS — N39.41 URGE INCONTINENCE: ICD-10-CM

## 2023-05-16 PROCEDURE — 52287 CYSTOSCOPY CHEMODENERVATION: CPT | Performed by: OBSTETRICS & GYNECOLOGY

## 2023-05-16 RX ORDER — LIDOCAINE HYDROCHLORIDE 10 MG/ML
50 INJECTION, SOLUTION INFILTRATION; PERINEURAL ONCE
Status: COMPLETED | OUTPATIENT
Start: 2023-05-16 | End: 2023-05-16

## 2023-05-16 RX ORDER — CYCLOBENZAPRINE HCL 10 MG
TABLET ORAL
COMMUNITY
Start: 2023-05-09

## 2023-05-16 RX ADMIN — LIDOCAINE HYDROCHLORIDE 50 ML: 10 INJECTION, SOLUTION INFILTRATION; PERINEURAL at 10:40

## 2023-05-16 NOTE — PROGRESS NOTES
5/16/2023       HPI:     Name: Fany Vazquez  YOB: 1960    CC: Patient with urinary urgency, frequency, overactive bladder. HPI: Fany Vazquez is a 60 y.o. female who is here for intravesical botulinum toxin A injection.       Past Medical History:   Past Medical History:   Diagnosis Date    Chest pain 11/10    negative myoview stress    Hematuria 10/2016    neg cysto, Dr Sudarshan Manzano    Hypertension     PONV (postoperative nausea and vomiting)     Primary osteoarthritis involving multiple joints     hands, Dr Bijan Dhaliwal    Routine health maintenance      TAC 5/13    Sleep apnea     did not tolerate CPAP    Smoker 12/4/2018    Tarsal tunnel syndrome 2014     Past Surgical History:   Past Surgical History:   Procedure Laterality Date    ARTHROPLASTY Right 10/21/2019    RIGHT THUMB CARPOMETACARPAL ARTHROPLASTY -BLOCK- -SLEEP APNEA- performed by Tatiana Xie MD at [address]  2004    stomach stapling    COLONOSCOPY  2011    polyps    COLONOSCOPY  01/13/2021    COLONOSCOPY N/A 1/13/2021    COLONOSCOPY WITH BIOPSY performed by Toro Godoy MD at [address] [place] [address] [address] Left 12/9/14    plantar fasciotomy, tendon transfer, gastrocnemius recession/cortisone    HAND TENDON SURGERY Right 10/21/2019    FLEXOR CARPI RADIALIS TENDON INTERPOSITION performed by Tatiana Xie MD at [facility] ([address] [place] [address])      KNEE ARTHROSCOPY      OTHER SURGICAL HISTORY  10/14/2016    cystoscopy, bilateral retrogrades, urethral dilation    URETHRAL STRICTURE DILATATION  7/14     Current Medications:  Current Outpatient Medications   Medication Sig Dispense Refill    nitrofurantoin, macrocrystal-monohydrate, (MACROBID) 100 MG capsule Take 1 capsule by mouth 2 times daily for 7 days Start taking 3 days prior to procedure, then continue for the rest of the week 14 capsule 0    mirabegron (MYRBETRIQ) 25 MG TB24 TAKE ONE (1) TABLET BY MOUTH DAILY 30

## 2023-05-23 ENCOUNTER — OFFICE VISIT (OUTPATIENT)
Dept: FAMILY MEDICINE CLINIC | Age: 63
End: 2023-05-23

## 2023-05-23 VITALS
BODY MASS INDEX: 46.6 KG/M2 | HEART RATE: 73 BPM | SYSTOLIC BLOOD PRESSURE: 111 MMHG | OXYGEN SATURATION: 97 % | HEIGHT: 63 IN | DIASTOLIC BLOOD PRESSURE: 76 MMHG | WEIGHT: 263 LBS

## 2023-05-23 DIAGNOSIS — I10 BENIGN ESSENTIAL HYPERTENSION: Primary | ICD-10-CM

## 2023-05-23 DIAGNOSIS — E55.9 VITAMIN D DEFICIENCY: ICD-10-CM

## 2023-05-23 DIAGNOSIS — G47.33 OSA (OBSTRUCTIVE SLEEP APNEA): ICD-10-CM

## 2023-05-23 DIAGNOSIS — E78.00 PURE HYPERCHOLESTEROLEMIA: ICD-10-CM

## 2023-05-23 DIAGNOSIS — F32.A DEPRESSION, UNSPECIFIED DEPRESSION TYPE: ICD-10-CM

## 2023-05-23 DIAGNOSIS — R91.1 PULMONARY NODULE: ICD-10-CM

## 2023-05-23 RX ORDER — BUPROPION HYDROCHLORIDE 150 MG/1
150 TABLET, EXTENDED RELEASE ORAL DAILY
Qty: 30 TABLET | Refills: 1 | Status: SHIPPED | OUTPATIENT
Start: 2023-05-23

## 2023-05-23 SDOH — ECONOMIC STABILITY: FOOD INSECURITY: WITHIN THE PAST 12 MONTHS, THE FOOD YOU BOUGHT JUST DIDN'T LAST AND YOU DIDN'T HAVE MONEY TO GET MORE.: NEVER TRUE

## 2023-05-23 SDOH — ECONOMIC STABILITY: HOUSING INSECURITY
IN THE LAST 12 MONTHS, WAS THERE A TIME WHEN YOU DID NOT HAVE A STEADY PLACE TO SLEEP OR SLEPT IN A SHELTER (INCLUDING NOW)?: NO

## 2023-05-23 SDOH — ECONOMIC STABILITY: INCOME INSECURITY: HOW HARD IS IT FOR YOU TO PAY FOR THE VERY BASICS LIKE FOOD, HOUSING, MEDICAL CARE, AND HEATING?: NOT HARD AT ALL

## 2023-05-23 SDOH — ECONOMIC STABILITY: FOOD INSECURITY: WITHIN THE PAST 12 MONTHS, YOU WORRIED THAT YOUR FOOD WOULD RUN OUT BEFORE YOU GOT MONEY TO BUY MORE.: NEVER TRUE

## 2023-05-23 ASSESSMENT — ENCOUNTER SYMPTOMS
SHORTNESS OF BREATH: 0
ABDOMINAL PAIN: 0
NAUSEA: 0
DIARRHEA: 0
WHEEZING: 0
COUGH: 0
ABDOMINAL DISTENTION: 0
VOMITING: 0
SORE THROAT: 0
CHEST TIGHTNESS: 0
RHINORRHEA: 0

## 2023-05-23 NOTE — PROGRESS NOTES
congestion, rhinorrhea and sore throat. Eyes:  Negative for visual disturbance. Respiratory:  Negative for cough, chest tightness, shortness of breath and wheezing. Cardiovascular:  Negative for chest pain and leg swelling. Gastrointestinal:  Negative for abdominal distention, abdominal pain, diarrhea, nausea and vomiting. Genitourinary:  Negative for dysuria, frequency, hematuria and urgency. Musculoskeletal:  Positive for arthralgias. Negative for gait problem and myalgias. Left knee   Skin:  Negative for rash. Neurological:  Negative for dizziness, weakness, light-headedness, numbness and headaches. Psychiatric/Behavioral:  Negative for sleep disturbance. PHYSICAL EXAM  /76 (Site: Right Upper Arm, Position: Sitting, Cuff Size: Large Adult)   Pulse 73   Ht 5' 3\" (1.6 m)   Wt 263 lb (119.3 kg)   SpO2 97%   BMI 46.59 kg/m²   Physical Exam  Vitals reviewed. Constitutional:       General: She is not in acute distress. Appearance: Normal appearance. She is well-developed. She is not diaphoretic. HENT:      Head: Normocephalic and atraumatic. Right Ear: External ear normal.      Left Ear: External ear normal.      Nose: Nose normal.      Mouth/Throat:      Mouth: Mucous membranes are moist.   Eyes:      General:         Right eye: No discharge. Left eye: No discharge. Pupils: Pupils are equal, round, and reactive to light. Neck:      Vascular: No JVD. Cardiovascular:      Rate and Rhythm: Normal rate and regular rhythm. Pulses: Normal pulses. Pulmonary:      Effort: Pulmonary effort is normal. No respiratory distress. Breath sounds: No stridor. No wheezing, rhonchi or rales. Chest:      Chest wall: No tenderness. Abdominal:      General: Bowel sounds are normal.      Palpations: Abdomen is soft. Musculoskeletal:         General: Normal range of motion. Cervical back: Normal range of motion and neck supple.    Skin:     General:

## 2023-06-02 ENCOUNTER — OFFICE VISIT (OUTPATIENT)
Dept: UROGYNECOLOGY | Age: 63
End: 2023-06-02

## 2023-06-02 VITALS
HEART RATE: 76 BPM | TEMPERATURE: 98.2 F | DIASTOLIC BLOOD PRESSURE: 76 MMHG | RESPIRATION RATE: 16 BRPM | SYSTOLIC BLOOD PRESSURE: 111 MMHG | OXYGEN SATURATION: 99 %

## 2023-06-02 DIAGNOSIS — R39.15 URINARY URGENCY: ICD-10-CM

## 2023-06-02 DIAGNOSIS — N39.41 URGE INCONTINENCE: ICD-10-CM

## 2023-06-02 DIAGNOSIS — R35.0 URINARY FREQUENCY: Primary | ICD-10-CM

## 2023-06-02 NOTE — PROGRESS NOTES
2023       HPI:     Name: Dian Ferguson  YOB: 1960    CC: Patient is a 60 y.o. presenting for evaluation of  OAB . Patient received 100 units Botox on 2023, here for 2 week PVR check. HPI: How long have you had this problem? years  Please rate the severity of your problem: moderate  Anything make it better? Botox - Already notcng mprovement    Ob/Gyn History:    OB History    Para Term  AB Living   0 0 0 0 0 0   SAB IAB Ectopic Molar Multiple Live Births   0 0 0 0 0 0     Past Medical History:   Past Medical History:   Diagnosis Date    Chest pain 11/10    negative myoview stress    Hematuria 10/2016    neg cysto, Dr Ethan Hussein    Hypertension     PONV (postoperative nausea and vomiting)     Primary osteoarthritis involving multiple joints     hands, Dr Stanton Gonzalez    Routine health maintenance      TAC     Sleep apnea     did not tolerate CPAP    Smoker 2018    Tarsal tunnel syndrome      Past Surgical History:   Past Surgical History:   Procedure Laterality Date    ARTHROPLASTY Right 10/21/2019    RIGHT THUMB CARPOMETACARPAL ARTHROPLASTY -BLOCK- -SLEEP APNEA- performed by Alfredito Bella MD at [address]  2004    stomach stapling    COLONOSCOPY  2011    polyps    COLONOSCOPY  2021    COLONOSCOPY N/A 2021    COLONOSCOPY WITH BIOPSY performed by Kailee De Leon MD at [address] [place] [address] [address] 14    plantar fasciotomy, tendon transfer, gastrocnemius recession/cortisone    HAND TENDON SURGERY Right 10/21/2019    FLEXOR CARPI RADIALIS TENDON INTERPOSITION performed by Alfredito Bella MD at [facility] ([address] [place] [address])      KNEE ARTHROSCOPY      OTHER SURGICAL HISTORY  10/14/2016    cystoscopy, bilateral retrogrades, urethral dilation    URETHRAL STRICTURE DILATATION       Allergies:    Allergies   Allergen Reactions    Codeine     Morphine And Related     Oxybutynin     Sulfa
appearance. HENT:      Head: Normocephalic. Eyes:      Conjunctiva/sclera: Conjunctivae normal.   Cardiovascular:      Rate and Rhythm: Normal rate. Pulmonary:      Effort: Pulmonary effort is normal.   Musculoskeletal:         General: Normal range of motion. Cervical back: Normal range of motion. Skin:     General: Skin is warm and dry. Neurological:      General: No focal deficit present. Mental Status: She is alert. Psychiatric:         Mood and Affect: Mood normal.         Behavior: Behavior normal.       Results for POC orders placed in visit on 06/02/23   POCT Urinalysis no Micro   Result Value Ref Range    Color, UA yellow     Clarity, UA clear     Glucose, UA POC neg     Bilirubin, UA neg     Ketones, UA neg     Spec Grav, UA 1.015     Blood, UA POC neg     pH, UA 6.5     Protein, UA POC neg     Urobilinogen, UA neg     Leukocytes, UA neg     Nitrite, UA neg        Assessment/Plan:     Nakul Rodgers is a 60 y.o. female with   1. Urinary frequency    2. Urinary urgency    3. Urge incontinence    UUI:  2 weeks s/p Bladder Botox. Pleased with outcomes  PVR slightly elevated at 250ml, patient is asymptomatic of this  UA: negative  She will follow up for botox consent in 5 months with RUBEN Hall - CNP      Orders Placed This Encounter   Procedures    POCT Urinalysis no Micro    DE INSJ NON-NDWELLG BLADDER CATHETER     No orders of the defined types were placed in this encounter.       RUBEN Lucas CNP

## 2023-07-13 ENCOUNTER — OFFICE VISIT (OUTPATIENT)
Dept: FAMILY MEDICINE CLINIC | Age: 63
End: 2023-07-13

## 2023-07-13 VITALS
BODY MASS INDEX: 48.02 KG/M2 | SYSTOLIC BLOOD PRESSURE: 120 MMHG | DIASTOLIC BLOOD PRESSURE: 85 MMHG | OXYGEN SATURATION: 93 % | HEART RATE: 75 BPM | HEIGHT: 63 IN | WEIGHT: 271 LBS | TEMPERATURE: 97.9 F

## 2023-07-13 DIAGNOSIS — E78.00 PURE HYPERCHOLESTEROLEMIA: ICD-10-CM

## 2023-07-13 DIAGNOSIS — F32.A DEPRESSION, UNSPECIFIED DEPRESSION TYPE: Primary | ICD-10-CM

## 2023-07-13 RX ORDER — ROSUVASTATIN CALCIUM 10 MG/1
TABLET, COATED ORAL
Qty: 30 TABLET | Refills: 3 | Status: SHIPPED | OUTPATIENT
Start: 2023-07-13

## 2023-07-13 RX ORDER — BUPROPION HYDROCHLORIDE 150 MG/1
150 TABLET, EXTENDED RELEASE ORAL 2 TIMES DAILY
Qty: 30 TABLET | Refills: 1 | Status: SHIPPED | OUTPATIENT
Start: 2023-07-13

## 2023-07-13 ASSESSMENT — ENCOUNTER SYMPTOMS
GASTROINTESTINAL NEGATIVE: 1
EYES NEGATIVE: 1
RESPIRATORY NEGATIVE: 1

## 2023-07-17 ENCOUNTER — TELEPHONE (OUTPATIENT)
Dept: FAMILY MEDICINE CLINIC | Age: 63
End: 2023-07-17

## 2023-07-17 DIAGNOSIS — F32.A DEPRESSION, UNSPECIFIED DEPRESSION TYPE: ICD-10-CM

## 2023-07-17 RX ORDER — BUPROPION HYDROCHLORIDE 150 MG/1
150 TABLET, EXTENDED RELEASE ORAL 2 TIMES DAILY
Qty: 60 TABLET | Refills: 1 | Status: SHIPPED | OUTPATIENT
Start: 2023-07-17

## 2023-07-21 ENCOUNTER — OFFICE VISIT (OUTPATIENT)
Dept: FAMILY MEDICINE CLINIC | Age: 63
End: 2023-07-21

## 2023-07-21 VITALS
HEART RATE: 64 BPM | HEIGHT: 65 IN | SYSTOLIC BLOOD PRESSURE: 119 MMHG | WEIGHT: 270 LBS | TEMPERATURE: 97.8 F | OXYGEN SATURATION: 94 % | DIASTOLIC BLOOD PRESSURE: 81 MMHG | BODY MASS INDEX: 44.98 KG/M2

## 2023-07-21 DIAGNOSIS — M79.671 PAIN OF RIGHT HEEL: Primary | ICD-10-CM

## 2023-07-21 ASSESSMENT — ENCOUNTER SYMPTOMS
RESPIRATORY NEGATIVE: 1
GASTROINTESTINAL NEGATIVE: 1

## 2023-07-21 NOTE — PROGRESS NOTES
Onset    High Blood Pressure Mother     High Cholesterol Mother     Diabetes Mother     Stroke Mother     High Blood Pressure Father     High Cholesterol Father     Asthma Father     High Blood Pressure Brother     High Cholesterol Brother     Heart Disease Brother 62    Diabetes Brother      Social History     Socioeconomic History    Marital status:      Spouse name: Not on file    Number of children: Not on file    Years of education: Not on file    Highest education level: Not on file   Occupational History    Not on file   Tobacco Use    Smoking status: Every Day     Packs/day: 1.00     Years: 42.00     Pack years: 42.00     Types: Cigarettes    Smokeless tobacco: Former     Quit date: 3/2/2021   Vaping Use    Vaping Use: Never used   Substance and Sexual Activity    Alcohol use: Yes     Comment: occasionally    Drug use: No    Sexual activity: Not Currently   Other Topics Concern    Not on file   Social History Narrative    Not on file     Social Determinants of Health     Financial Resource Strain: Low Risk     Difficulty of Paying Living Expenses: Not hard at all   Food Insecurity: No Food Insecurity    Worried About Running Out of Food in the Last Year: Never true    Ran Out of Food in the Last Year: Never true   Transportation Needs: Unknown    Lack of Transportation (Medical): Not on file    Lack of Transportation (Non-Medical):  No   Physical Activity: Not on file   Stress: Not on file   Social Connections: Not on file   Intimate Partner Violence: Not on file   Housing Stability: Unknown    Unable to Pay for Housing in the Last Year: Not on file    Number of Places Lived in the Last Year: Not on file    Unstable Housing in the Last Year: No     Current Outpatient Medications   Medication Sig Dispense Refill    buPROPion (ZYBAN) 150 MG extended release tablet Take 1 tablet by mouth 2 times daily 60 tablet 1    rosuvastatin (CRESTOR) 10 MG tablet TAKE ONE (1) TABLET BY MOUTH NIGHTLY 30 tablet 3

## 2023-07-31 ENCOUNTER — HOSPITAL ENCOUNTER (OUTPATIENT)
Age: 63
Discharge: HOME | End: 2023-07-31
Payer: MEDICARE

## 2023-07-31 ENCOUNTER — HOSPITAL ENCOUNTER (OUTPATIENT)
Dept: GENERAL RADIOLOGY | Age: 63
Discharge: HOME | End: 2023-07-31
Payer: MEDICARE

## 2023-07-31 DIAGNOSIS — M79.671 PAIN OF RIGHT HEEL: ICD-10-CM

## 2023-07-31 PROCEDURE — 73650 X-RAY EXAM OF HEEL: CPT

## 2023-08-08 DIAGNOSIS — M79.671 PAIN OF RIGHT HEEL: Primary | ICD-10-CM

## 2023-08-31 ENCOUNTER — OFFICE VISIT (OUTPATIENT)
Dept: FAMILY MEDICINE CLINIC | Age: 63
End: 2023-08-31

## 2023-08-31 VITALS
SYSTOLIC BLOOD PRESSURE: 104 MMHG | HEART RATE: 66 BPM | BODY MASS INDEX: 46.32 KG/M2 | TEMPERATURE: 98 F | HEIGHT: 65 IN | WEIGHT: 278 LBS | OXYGEN SATURATION: 93 % | DIASTOLIC BLOOD PRESSURE: 68 MMHG

## 2023-08-31 DIAGNOSIS — F32.89 OTHER DEPRESSION: ICD-10-CM

## 2023-08-31 DIAGNOSIS — Z87.891 PERSONAL HISTORY OF TOBACCO USE: ICD-10-CM

## 2023-08-31 DIAGNOSIS — Z01.818 PRE-OP EXAM: Primary | ICD-10-CM

## 2023-08-31 DIAGNOSIS — E78.00 PURE HYPERCHOLESTEROLEMIA: ICD-10-CM

## 2023-08-31 LAB
ALBUMIN SERPL-MCNC: 4.2 G/DL (ref 3.4–5)
ALBUMIN/GLOB SERPL: 2.1 {RATIO} (ref 1.1–2.2)
ALP SERPL-CCNC: 70 U/L (ref 40–129)
ALT SERPL-CCNC: 15 U/L (ref 10–40)
ANION GAP SERPL CALCULATED.3IONS-SCNC: 9 MMOL/L (ref 3–16)
AST SERPL-CCNC: 16 U/L (ref 15–37)
BASOPHILS # BLD: 0.1 K/UL (ref 0–0.2)
BASOPHILS NFR BLD: 1 %
BILIRUB SERPL-MCNC: 0.3 MG/DL (ref 0–1)
BUN SERPL-MCNC: 20 MG/DL (ref 7–20)
CALCIUM SERPL-MCNC: 9.6 MG/DL (ref 8.3–10.6)
CHLORIDE SERPL-SCNC: 106 MMOL/L (ref 99–110)
CHOLEST SERPL-MCNC: 122 MG/DL (ref 0–199)
CO2 SERPL-SCNC: 28 MMOL/L (ref 21–32)
CREAT SERPL-MCNC: 0.8 MG/DL (ref 0.6–1.2)
DEPRECATED RDW RBC AUTO: 13.3 % (ref 12.4–15.4)
EOSINOPHIL # BLD: 0.2 K/UL (ref 0–0.6)
EOSINOPHIL NFR BLD: 3.6 %
GFR SERPLBLD CREATININE-BSD FMLA CKD-EPI: >60 ML/MIN/{1.73_M2}
GLUCOSE SERPL-MCNC: 99 MG/DL (ref 70–99)
HCT VFR BLD AUTO: 37.7 % (ref 36–48)
HDLC SERPL-MCNC: 46 MG/DL (ref 40–60)
HGB BLD-MCNC: 12.8 G/DL (ref 12–16)
LDL CHOLESTEROL CALCULATED: 50 MG/DL
LYMPHOCYTES # BLD: 1.8 K/UL (ref 1–5.1)
LYMPHOCYTES NFR BLD: 34.3 %
MCH RBC QN AUTO: 31.7 PG (ref 26–34)
MCHC RBC AUTO-ENTMCNC: 33.9 G/DL (ref 31–36)
MCV RBC AUTO: 93.5 FL (ref 80–100)
MONOCYTES # BLD: 0.6 K/UL (ref 0–1.3)
MONOCYTES NFR BLD: 10.5 %
NEUTROPHILS # BLD: 2.7 K/UL (ref 1.7–7.7)
NEUTROPHILS NFR BLD: 50.6 %
PLATELET # BLD AUTO: 264 K/UL (ref 135–450)
PMV BLD AUTO: 9.6 FL (ref 5–10.5)
POTASSIUM SERPL-SCNC: 4.4 MMOL/L (ref 3.5–5.1)
PROT SERPL-MCNC: 6.2 G/DL (ref 6.4–8.2)
RBC # BLD AUTO: 4.03 M/UL (ref 4–5.2)
SODIUM SERPL-SCNC: 143 MMOL/L (ref 136–145)
TRIGL SERPL-MCNC: 130 MG/DL (ref 0–150)
VLDLC SERPL CALC-MCNC: 26 MG/DL
WBC # BLD AUTO: 5.4 K/UL (ref 4–11)

## 2023-08-31 RX ORDER — BUPROPION HYDROCHLORIDE 150 MG/1
150 TABLET, FILM COATED, EXTENDED RELEASE ORAL 2 TIMES DAILY
Qty: 180 TABLET | Refills: 1 | Status: SHIPPED | OUTPATIENT
Start: 2023-08-31

## 2023-08-31 RX ORDER — ROSUVASTATIN CALCIUM 10 MG/1
10 TABLET, COATED ORAL DAILY
Qty: 90 TABLET | Refills: 0 | Status: SHIPPED | OUTPATIENT
Start: 2023-08-31

## 2023-08-31 NOTE — PROGRESS NOTES
Pre Op Med History  Cold/Chronic Cough/Tuberculosis  --  no  Bronchitis/COPD  --  no  Asthma/SOB  --  no  Rheumatic Fever/Heart Murmur  --  no  High Blood Pressure/Low Blood Pressure  --  yes - high  Swelling of Feet/Fluid In Lungs  --  yes - swelling in feet   Heart Attack/Chest Pain  --  no  Irregular, Fast Heartbeats  --  no  Do you bruise easily?   --  no  Anemia  --  no  Diabetes/Low Blood Sugar  --  no  Are you Pregnant  --  N/A  Last Menstrual Period  --  N/A  Serious Illness During Pregnancy  --  N/A  Breast Disease  --  no  Kidney Disease  --  no  Jaundice/Hepatitis  --  no  Hiatal Hernia/Peptic Ulcer Disease  --  no  Convulsions/Epilepsy/Stroke  --  no  Polio/Meningitis Paralysis  --  no  Back Pain/ Slipped Disc  --  yes - back pain  Psychological Disease  --  no  Thyroid Disease  --  no  Glaucoma/Visual Impairment  --  no  Skeletal Deformities/Defects/Arthritis --  yes - arthritis   Loose Teeth/Caps on Front Teeth  --  no  Have you had any Surgery  --  yes -   Any History of anesthesia complication in self or family  -- yes - nausea   Prostate Disease  --  N/A  Cancer  --  no  DVT/PE/PVD --  no  Weight Loss/Gain  --  no
sounds              Lungs clear  Heart: Rate 66, rhythm regular  Abdomen:  Soft  with no masses noted                     Hernia - none                    Liver and spleen not palpably enlarged                    Bowel sounds are normally active                    Tenderness - none                    Rebound or rididity - none  Neurologic:  Cranial nerves 2-12 are intact                      No ataxia                     No focal motor deficits found                        Reflexes in upper extremities are intact and symmetrical                      Reflexes in lower extremities are intact and symmetrical  Skin: Warm and dry, turgor normal           No rash            No lesion  Psychiatric: mood and affect intact                     speech and thought processes seem appropriate     Assessment and Plan:  1. Pre-op exam  Patient presents today for preop examination. Patient is scheduled for Vertiflex/MILD with Dr Brigido Wise on 9/18/2023. She denies any recent illnesses or infections. No recent changes in medical history. Patient has known history of benign essential hypertension, IBS, migraine, osteoarthritis, SPENCER, obesity, pure hypercholesterolemia, vitamin D deficiency. Chronic conditions are stable. Preop labs and EKG performed in the office. EKG reviewed and stable. Patient denies any current use of anticoagulation therapy. Patient aware to discontinue use of NSAIDs as well as aspirin prior to procedure. It is in my medical opinion that this patient is clinically stable and at moderate risk to proceed with intended surgery/anesthesia as planned. - CBC with Auto Differential  - Comprehensive Metabolic Panel  - EKG 12 lead    2. Pure hypercholesterolemia  Stable. Labs ordered and pending. Patient compliant with Crestor 10 mg daily. Continue with current treatment management follow-up in 6 months, sooner for new or worsening symptoms. - rosuvastatin (CRESTOR) 10 MG tablet;  Take 1 tablet by mouth daily

## 2023-09-25 DIAGNOSIS — I10 BENIGN ESSENTIAL HYPERTENSION: ICD-10-CM

## 2023-09-26 RX ORDER — LISINOPRIL AND HYDROCHLOROTHIAZIDE 12.5; 1 MG/1; MG/1
TABLET ORAL
Qty: 90 TABLET | Refills: 1 | Status: SHIPPED | OUTPATIENT
Start: 2023-09-26

## 2023-10-17 ENCOUNTER — OFFICE VISIT (OUTPATIENT)
Dept: UROGYNECOLOGY | Age: 63
End: 2023-10-17
Payer: MEDICARE

## 2023-10-17 VITALS
RESPIRATION RATE: 16 BRPM | DIASTOLIC BLOOD PRESSURE: 83 MMHG | OXYGEN SATURATION: 98 % | TEMPERATURE: 97.8 F | SYSTOLIC BLOOD PRESSURE: 131 MMHG | HEART RATE: 82 BPM

## 2023-10-17 DIAGNOSIS — R39.15 URINARY URGENCY: ICD-10-CM

## 2023-10-17 DIAGNOSIS — N39.41 URGE INCONTINENCE: ICD-10-CM

## 2023-10-17 DIAGNOSIS — R35.0 URINARY FREQUENCY: Primary | ICD-10-CM

## 2023-10-17 PROCEDURE — 4004F PT TOBACCO SCREEN RCVD TLK: CPT | Performed by: OBSTETRICS & GYNECOLOGY

## 2023-10-17 PROCEDURE — G8417 CALC BMI ABV UP PARAM F/U: HCPCS | Performed by: OBSTETRICS & GYNECOLOGY

## 2023-10-17 PROCEDURE — 3079F DIAST BP 80-89 MM HG: CPT | Performed by: OBSTETRICS & GYNECOLOGY

## 2023-10-17 PROCEDURE — G8427 DOCREV CUR MEDS BY ELIG CLIN: HCPCS | Performed by: OBSTETRICS & GYNECOLOGY

## 2023-10-17 PROCEDURE — 99214 OFFICE O/P EST MOD 30 MIN: CPT | Performed by: OBSTETRICS & GYNECOLOGY

## 2023-10-17 PROCEDURE — 3075F SYST BP GE 130 - 139MM HG: CPT | Performed by: OBSTETRICS & GYNECOLOGY

## 2023-10-17 PROCEDURE — G8484 FLU IMMUNIZE NO ADMIN: HCPCS | Performed by: OBSTETRICS & GYNECOLOGY

## 2023-10-17 PROCEDURE — 3017F COLORECTAL CA SCREEN DOC REV: CPT | Performed by: OBSTETRICS & GYNECOLOGY

## 2023-10-17 NOTE — PROGRESS NOTES
10/17/2023       HPI:     Name: Jaylan Mark  YOB: 1960    CC: Patient is a 58 y.o. presenting for evaluation of worsening symptoms of OAB .     HPI: How long have you had this problem? years  Please rate the severity of your problem: moderate  Anything make it better?  Last round of Botox on 23 - patient has found very helpful and is thinking long term she might need to find someone closer to her home to continue her botox injections    Ob/Gyn History:    OB History    Para Term  AB Living   0 0 0 0 0 0   SAB IAB Ectopic Molar Multiple Live Births   0 0 0 0 0 0     Past Medical History:   Past Medical History:   Diagnosis Date    Chest pain 11/10    negative myoview stress    Hematuria 10/2016    neg cysto, Dr Darylene Moeller    Hypertension     PONV (postoperative nausea and vomiting)     Primary osteoarthritis involving multiple joints     hands, Dr Torsten Machado    Routine health maintenance      TAC     Sleep apnea     did not tolerate CPAP    Smoker 2018    Tarsal tunnel syndrome      Past Surgical History:   Past Surgical History:   Procedure Laterality Date    ARTHROPLASTY Right 10/21/2019    RIGHT THUMB CARPOMETACARPAL ARTHROPLASTY -BLOCK- -SLEEP APNEA- performed by Koki Hinkle MD at [address] [place]      stomach stapling    COLONOSCOPY  2011    polyps    COLONOSCOPY  2021    COLONOSCOPY N/A 2021    COLONOSCOPY WITH BIOPSY performed by Sd Ford MD at [address] [facility] [address]    plantar fasciotomy, tendon transfer, gastrocnemius recession/cortisone    HAND TENDON SURGERY Right 10/21/2019    FLEXOR CARPI RADIALIS TENDON INTERPOSITION performed by Koki Hinkle MD at [address] [facility]      KNEE ARTHROSCOPY      OTHER SURGICAL HISTORY  10/14/2016    cystoscopy, bilateral retrogrades, urethral dilation    URETHRAL STRICTURE DILATATION       Allergies:

## 2023-11-01 ENCOUNTER — TELEPHONE (OUTPATIENT)
Dept: UROGYNECOLOGY | Age: 63
End: 2023-11-01

## 2023-11-01 DIAGNOSIS — F41.9 ANXIETY: Primary | ICD-10-CM

## 2023-11-01 RX ORDER — IBUPROFEN 600 MG/1
600 TABLET ORAL ONCE
Qty: 1 TABLET | Refills: 0 | Status: SHIPPED | OUTPATIENT
Start: 2023-11-01 | End: 2023-11-01

## 2023-11-01 RX ORDER — DIAZEPAM 5 MG/1
5 TABLET ORAL ONCE
Qty: 1 TABLET | Refills: 0 | OUTPATIENT
Start: 2023-11-01 | End: 2023-11-01

## 2023-11-01 RX ORDER — NITROFURANTOIN 25; 75 MG/1; MG/1
100 CAPSULE ORAL 2 TIMES DAILY
Qty: 14 CAPSULE | Refills: 0 | Status: SHIPPED | OUTPATIENT
Start: 2023-11-01 | End: 2023-11-08

## 2023-11-01 NOTE — TELEPHONE ENCOUNTER
Spoke with patient over the phone. Allergies reviewed. Sent in [address] [employer] [place] 100 mg twice a day for 7 days to start 3 days prior to procedure. Sent in one 600 mg tablet of ibuprofen to take 1 hour prior. Called in one 5 mg tablet of valium. Advised patient she will need a . No other concerns at this time.  Electronically signed by Alyssa Lin RN on 11/1/2023 at 9:29 AM

## 2023-11-07 ENCOUNTER — TELEMEDICINE (OUTPATIENT)
Dept: FAMILY MEDICINE CLINIC | Age: 63
End: 2023-11-07

## 2023-11-07 DIAGNOSIS — Z00.00 MEDICARE ANNUAL WELLNESS VISIT, SUBSEQUENT: Primary | ICD-10-CM

## 2023-11-07 ASSESSMENT — LIFESTYLE VARIABLES
HOW OFTEN DO YOU HAVE A DRINK CONTAINING ALCOHOL: MONTHLY OR LESS
HOW MANY STANDARD DRINKS CONTAINING ALCOHOL DO YOU HAVE ON A TYPICAL DAY: 1 OR 2

## 2023-11-07 ASSESSMENT — PATIENT HEALTH QUESTIONNAIRE - PHQ9
SUM OF ALL RESPONSES TO PHQ9 QUESTIONS 1 & 2: 2
SUM OF ALL RESPONSES TO PHQ QUESTIONS 1-9: 2
1. LITTLE INTEREST OR PLEASURE IN DOING THINGS: 1
DEPRESSION UNABLE TO ASSESS: PT REFUSES
2. FEELING DOWN, DEPRESSED OR HOPELESS: 1
SUM OF ALL RESPONSES TO PHQ QUESTIONS 1-9: 2

## 2023-11-07 NOTE — PATIENT INSTRUCTIONS
Advance Directives: Care Instructions  Overview  An advance directive is a legal way to state your wishes at the end of your life. It tells your family and your doctor what to do if you can't say what you want. There are two main types of advance directives. You can change them any time your wishes change. Living will. This form tells your family and your doctor your wishes about life support and other treatment. The form is also called a declaration. Medical power of . This form lets you name a person to make treatment decisions for you when you can't speak for yourself. This person is called a health care agent (health care proxy, health care surrogate). The form is also called a durable power of  for health care. If you do not have an advance directive, decisions about your medical care may be made by a family member, or by a doctor or a  who doesn't know you. It may help to think of an advance directive as a gift to the people who care for you. If you have one, they won't have to make tough decisions by themselves. For more information, including forms for your state, see the [address] [place] [address] website (www.caringinfo.org/planning/advance-directives/). Follow-up care is a key part of your treatment and safety. Be sure to make and go to all appointments, and call your doctor if you are having problems. It's also a good idea to know your test results and keep a list of the medicines you take. What should you include in an advance directive? Many states have a unique advance directive form. (It may ask you to address specific issues.) Or you might use a universal form that's approved by many states. If your form doesn't tell you what to address, it may be hard to know what to include in your advance directive. Use the questions below to help you get started. Who do you want to make decisions about your medical care if you are not able to?   What life-support measures do you want if you

## 2023-11-07 NOTE — PROGRESS NOTES
appropriate.    The patient was located at Home: [address] [place] [address]  Provider was located at [facility] (Appt Dept): [address],  [facility] [address] [facility]

## 2023-11-14 ENCOUNTER — PROCEDURE VISIT (OUTPATIENT)
Dept: UROGYNECOLOGY | Age: 63
End: 2023-11-14
Payer: MEDICARE

## 2023-11-14 VITALS
TEMPERATURE: 97.5 F | OXYGEN SATURATION: 95 % | DIASTOLIC BLOOD PRESSURE: 83 MMHG | RESPIRATION RATE: 16 BRPM | SYSTOLIC BLOOD PRESSURE: 119 MMHG | HEART RATE: 73 BPM

## 2023-11-14 DIAGNOSIS — R39.15 URINARY URGENCY: ICD-10-CM

## 2023-11-14 DIAGNOSIS — R35.0 URINARY FREQUENCY: Primary | ICD-10-CM

## 2023-11-14 DIAGNOSIS — N39.41 URGE INCONTINENCE: ICD-10-CM

## 2023-11-14 DIAGNOSIS — F17.200 SMOKER: ICD-10-CM

## 2023-11-14 DIAGNOSIS — N32.81 OVERACTIVE BLADDER: ICD-10-CM

## 2023-11-14 LAB
BILIRUBIN, POC: NORMAL
BLOOD URINE, POC: NORMAL
CLARITY, POC: NORMAL
COLOR, POC: NORMAL
GLUCOSE URINE, POC: NORMAL
KETONES, POC: NORMAL
LEUKOCYTE EST, POC: NORMAL
NITRITE, POC: NORMAL
PH, POC: NORMAL
PROTEIN, POC: NORMAL
SPECIFIC GRAVITY, POC: NORMAL
UROBILINOGEN, POC: NORMAL

## 2023-11-14 PROCEDURE — 52287 CYSTOSCOPY CHEMODENERVATION: CPT | Performed by: OBSTETRICS & GYNECOLOGY

## 2023-11-14 PROCEDURE — 81002 URINALYSIS NONAUTO W/O SCOPE: CPT | Performed by: OBSTETRICS & GYNECOLOGY

## 2023-11-14 RX ORDER — METHYLPREDNISOLONE 4 MG/1
TABLET ORAL
COMMUNITY
Start: 2023-11-01

## 2023-11-14 RX ORDER — LIDOCAINE HYDROCHLORIDE 10 MG/ML
50 INJECTION, SOLUTION INFILTRATION; PERINEURAL ONCE
Status: COMPLETED | OUTPATIENT
Start: 2023-11-14 | End: 2023-11-14

## 2023-11-14 RX ADMIN — LIDOCAINE HYDROCHLORIDE 50 ML: 10 INJECTION, SOLUTION INFILTRATION; PERINEURAL at 12:52

## 2023-12-06 DIAGNOSIS — E78.00 PURE HYPERCHOLESTEROLEMIA: ICD-10-CM

## 2023-12-07 RX ORDER — ROSUVASTATIN CALCIUM 10 MG/1
10 TABLET, COATED ORAL DAILY
Qty: 90 TABLET | Refills: 0 | Status: SHIPPED | OUTPATIENT
Start: 2023-12-07

## 2024-02-19 ENCOUNTER — TELEPHONE (OUTPATIENT)
Dept: TELEMETRY | Age: 64
End: 2024-02-19

## 2024-02-19 NOTE — TELEPHONE ENCOUNTER
Patient due for annual CT Lung Screening. Reminder letter mailed.    Scan already ordered. Central Scheduling number provided.    Sarita Olvera RN

## 2024-02-23 ENCOUNTER — TELEPHONE (OUTPATIENT)
Dept: FAMILY MEDICINE CLINIC | Age: 64
End: 2024-02-23

## 2024-02-23 NOTE — TELEPHONE ENCOUNTER
Re-sent release.  Pt informed.   [facility] states they did not receive the first request even though we had confirmation.

## 2024-02-23 NOTE — TELEPHONE ENCOUNTER
Patient says she completed a request to transfer records at the office of  Dr Flora Mejia in [place] and they sent the request to [facility]. She says she called [facility] and they told her they have not received the request to transfer records.  Need to have records transferred. She is asking for a copy of the last office visit with Ashli Rowland CNP so they can have some kind of record when she sees Dr Mejia.

## 2024-03-14 ENCOUNTER — HOSPITAL ENCOUNTER (OUTPATIENT)
Dept: WOMENS IMAGING | Age: 64
Discharge: HOME | End: 2024-03-14
Attending: FAMILY MEDICINE
Payer: MEDICARE

## 2024-03-14 VITALS — WEIGHT: 290 LBS | HEIGHT: 63 IN | BODY MASS INDEX: 51.38 KG/M2

## 2024-03-14 DIAGNOSIS — Z12.31 ENCOUNTER FOR SCREENING MAMMOGRAM FOR MALIGNANT NEOPLASM OF BREAST: ICD-10-CM

## 2024-03-14 PROCEDURE — 77063 BREAST TOMOSYNTHESIS BI: CPT

## 2024-03-22 ENCOUNTER — HOSPITAL ENCOUNTER (OUTPATIENT)
Dept: MRI IMAGING | Age: 64
Discharge: HOME | End: 2024-03-22
Attending: FAMILY MEDICINE
Payer: MEDICARE

## 2024-03-22 DIAGNOSIS — M54.9 SPINE PAIN: ICD-10-CM

## 2024-03-22 PROCEDURE — 72148 MRI LUMBAR SPINE W/O DYE: CPT

## 2024-04-16 ENCOUNTER — HOSPITAL ENCOUNTER (OUTPATIENT)
Dept: GENERAL RADIOLOGY | Age: 64
Discharge: HOME | End: 2024-04-16
Payer: MEDICARE

## 2024-04-16 ENCOUNTER — HOSPITAL ENCOUNTER (OUTPATIENT)
Age: 64
Discharge: HOME | End: 2024-04-16
Payer: MEDICARE

## 2024-04-16 DIAGNOSIS — M54.50 LOW BACK PAIN, UNSPECIFIED BACK PAIN LATERALITY, UNSPECIFIED CHRONICITY, UNSPECIFIED WHETHER SCIATICA PRESENT: ICD-10-CM

## 2024-04-16 PROCEDURE — 72110 X-RAY EXAM L-2 SPINE 4/>VWS: CPT

## 2024-05-02 ENCOUNTER — HOSPITAL ENCOUNTER (OUTPATIENT)
Dept: GENERAL RADIOLOGY | Age: 64
Discharge: HOME | End: 2024-05-02
Attending: FAMILY MEDICINE
Payer: MEDICARE

## 2024-05-02 ENCOUNTER — HOSPITAL ENCOUNTER (OUTPATIENT)
Dept: CT IMAGING | Age: 64
Discharge: HOME | End: 2024-05-02
Attending: FAMILY MEDICINE
Payer: MEDICARE

## 2024-05-02 DIAGNOSIS — F17.211 CIGARETTE NICOTINE DEPENDENCE IN REMISSION: ICD-10-CM

## 2024-05-02 DIAGNOSIS — N95.8 OTHER SPECIFIED MENOPAUSAL AND PERIMENOPAUSAL DISORDERS: ICD-10-CM

## 2024-05-02 PROCEDURE — 71271 CT THORAX LUNG CANCER SCR C-: CPT

## 2024-05-02 PROCEDURE — 77080 DXA BONE DENSITY AXIAL: CPT

## 2024-05-30 ENCOUNTER — HOSPITAL ENCOUNTER (OUTPATIENT)
Dept: CARDIOLOGY | Age: 64
Discharge: HOME | End: 2024-06-01
Payer: MEDICARE

## 2024-05-30 DIAGNOSIS — R06.02 SHORTNESS OF BREATH: ICD-10-CM

## 2024-05-30 DIAGNOSIS — R94.31 EKG ABNORMALITIES: ICD-10-CM

## 2024-05-30 DIAGNOSIS — E11.9 DIABETES MELLITUS WITHOUT COMPLICATION (HCC): ICD-10-CM

## 2024-05-30 DIAGNOSIS — I10 ESSENTIAL HYPERTENSION, MALIGNANT: ICD-10-CM

## 2024-05-30 PROCEDURE — 93017 CV STRESS TEST TRACING ONLY: CPT

## 2024-05-30 PROCEDURE — 3430000000 HC RX DIAGNOSTIC RADIOPHARMACEUTICAL: Performed by: INTERNAL MEDICINE

## 2024-05-30 PROCEDURE — 6360000002 HC RX W HCPCS: Performed by: INTERNAL MEDICINE

## 2024-05-30 PROCEDURE — A9502 TC99M TETROFOSMIN: HCPCS | Performed by: INTERNAL MEDICINE

## 2024-05-30 RX ORDER — REGADENOSON 0.08 MG/ML
0.4 INJECTION, SOLUTION INTRAVENOUS
Status: COMPLETED | OUTPATIENT
Start: 2024-05-30 | End: 2024-05-30

## 2024-05-30 RX ADMIN — REGADENOSON 0.4 MG: 0.08 INJECTION, SOLUTION INTRAVENOUS at 12:41

## 2024-05-30 RX ADMIN — TETROFOSMIN 34.1 MILLICURIE: 1.38 INJECTION, POWDER, LYOPHILIZED, FOR SOLUTION INTRAVENOUS at 12:42

## 2024-05-31 ENCOUNTER — HOSPITAL ENCOUNTER (OUTPATIENT)
Dept: CARDIOLOGY | Age: 64
End: 2024-05-31
Payer: MEDICARE

## 2024-05-31 LAB
NUC STRESS EJECTION FRACTION: 65 %
NUC STRESS LV EDV: 91 ML (ref 56–104)
NUC STRESS LV ESV: 32 ML (ref 19–49)
NUC STRESS LV MASS: 132 G
STRESS BASELINE DIAS BP: 97 MMHG
STRESS BASELINE HR: 76 BPM
STRESS BASELINE SYS BP: 135 MMHG
STRESS ESTIMATED WORKLOAD: 1 METS
STRESS PEAK DIAS BP: 87 MMHG
STRESS PEAK SYS BP: 149 MMHG
STRESS PERCENT HR ACHIEVED: 67 %
STRESS POST PEAK HR: 104 BPM
STRESS RATE PRESSURE PRODUCT: NORMAL BPM*MMHG
STRESS TARGET HR: 156 BPM

## 2024-05-31 PROCEDURE — A9502 TC99M TETROFOSMIN: HCPCS | Performed by: INTERNAL MEDICINE

## 2024-05-31 PROCEDURE — 78451 HT MUSCLE IMAGE SPECT SING: CPT

## 2024-05-31 PROCEDURE — 3430000000 HC RX DIAGNOSTIC RADIOPHARMACEUTICAL: Performed by: INTERNAL MEDICINE

## 2024-05-31 RX ADMIN — TETROFOSMIN 32 MILLICURIE: 1.38 INJECTION, POWDER, LYOPHILIZED, FOR SOLUTION INTRAVENOUS at 12:27

## 2024-06-25 ENCOUNTER — HOSPITAL ENCOUNTER (OUTPATIENT)
Dept: ULTRASOUND IMAGING | Age: 64
Discharge: HOME | End: 2024-06-25
Attending: FAMILY MEDICINE
Payer: MEDICARE

## 2024-06-25 DIAGNOSIS — E04.1 THYROID NODULE: ICD-10-CM

## 2024-06-25 PROCEDURE — 76536 US EXAM OF HEAD AND NECK: CPT

## 2024-08-11 ENCOUNTER — HOSPITAL ENCOUNTER (OUTPATIENT)
Age: 64
Discharge: HOME | End: 2024-08-11
Payer: MEDICARE

## 2024-08-11 ENCOUNTER — HOSPITAL ENCOUNTER (OUTPATIENT)
Dept: GENERAL RADIOLOGY | Age: 64
Discharge: HOME | End: 2024-08-11
Payer: MEDICARE

## 2024-08-11 DIAGNOSIS — R52 PAIN: ICD-10-CM

## 2024-08-11 PROCEDURE — 73060 X-RAY EXAM OF HUMERUS: CPT

## 2025-03-21 ENCOUNTER — HOSPITAL ENCOUNTER (OUTPATIENT)
Dept: WOMENS IMAGING | Age: 65
Discharge: HOME | End: 2025-03-21
Payer: MEDICARE

## 2025-03-21 VITALS — HEIGHT: 63 IN | WEIGHT: 160 LBS | BODY MASS INDEX: 28.35 KG/M2

## 2025-03-21 DIAGNOSIS — Z12.31 ENCOUNTER FOR SCREENING MAMMOGRAM FOR BREAST CANCER: ICD-10-CM

## 2025-03-21 PROCEDURE — 77063 BREAST TOMOSYNTHESIS BI: CPT

## 2025-05-07 ENCOUNTER — TELEPHONE (OUTPATIENT)
Dept: TELEMETRY | Age: 65
End: 2025-05-07

## 2025-05-07 NOTE — TELEPHONE ENCOUNTER
Pt due for Annual CT Lung Screening, reminder letter mailed, Central Scheduling phone number provided.    Routed to PCP, MADINA Sandoval RN

## 2025-05-20 ENCOUNTER — TRANSCRIBE ORDERS (OUTPATIENT)
Dept: ADMINISTRATIVE | Age: 65
End: 2025-05-20

## 2025-05-20 DIAGNOSIS — F17.211 CIGARETTE NICOTINE DEPENDENCE IN REMISSION: ICD-10-CM

## 2025-05-20 DIAGNOSIS — Z87.891 PERSONAL HISTORY OF TOBACCO USE, PRESENTING HAZARDS TO HEALTH: Primary | ICD-10-CM

## 2025-05-25 ENCOUNTER — HOSPITAL ENCOUNTER (OUTPATIENT)
Dept: CT IMAGING | Age: 65
Discharge: HOME | End: 2025-05-25
Payer: MEDICARE

## 2025-05-25 DIAGNOSIS — F17.211 CIGARETTE NICOTINE DEPENDENCE IN REMISSION: ICD-10-CM

## 2025-05-25 DIAGNOSIS — Z87.891 PERSONAL HISTORY OF TOBACCO USE, PRESENTING HAZARDS TO HEALTH: ICD-10-CM

## 2025-05-25 PROCEDURE — 71271 CT THORAX LUNG CANCER SCR C-: CPT

## (undated) DEVICE — ELECTRODE ECG MONITR FOAM TEAR DROP ADLT RED

## (undated) DEVICE — SUTURE MERS SZ 4-0 L18IN NONABSORBABLE WHT L13MM P-3 3/8 R691G

## (undated) DEVICE — SUTURE ETHLN SZ 4-0 L18IN NONABSORBABLE BLK L19MM PS-2 3/8 1667H

## (undated) DEVICE — PADDING CAST W2INXL4YD COT LO LINTING WYTEX

## (undated) DEVICE — SUTURE COAT VCRL SZ 4-0 L18IN ABSRB UD L19MM PS-2 1/2 CIR J496G

## (undated) DEVICE — PADDING CAST W2INXL4YD COT RAYON BLEND HIGHLY ABSRB

## (undated) DEVICE — Device

## (undated) DEVICE — ZIMMER® STERILE DISPOSABLE TOURNIQUET CUFF WITH PLC, DUAL PORT, SINGLE BLADDER, 18 IN. (46 CM)

## (undated) DEVICE — MICRO SAGITTAL BLADE, FINE, 5.5 X 18.5 X 0.4 MM

## (undated) DEVICE — K WIRE FIX L152MM DIA1.6MM S STL 2 TRCR PNT
Type: IMPLANTABLE DEVICE | Site: THUMB | Status: NON-FUNCTIONAL
Removed: 2019-10-21

## (undated) DEVICE — SOLUTION IV 1000ML LAC RINGERS PH 6.5 INJ USP VIAFLX PLAS

## (undated) DEVICE — ENDO CARRY-ON PROCEDURE KIT INCLUDES SUCTION TUBING, LUBRICANT, GAUZE, BIOHAZARD STICKER, TRANSPORT PAD AND INTERCEPT BEDSIDE KIT.: Brand: ENDO CARRY-ON PROCEDURE KIT

## (undated) DEVICE — SOLUTION IV IRRIG 500ML 0.9% SODIUM CHL 2F7123

## (undated) DEVICE — COMFO-TEX ELASTIC BANDAGE LATEX FREE, 3INX5YD: Brand: COMFO-TEX™

## (undated) DEVICE — INTENDED FOR TISSUE SEPARATION, AND OTHER PROCEDURES THAT REQUIRE A SHARP SURGICAL BLADE TO PUNCTURE OR CUT.: Brand: BARD-PARKER ® STAINLESS STEEL BLADES

## (undated) DEVICE — GLOVE SURG SZ 65 L12IN FNGR THK79MIL GRN LTX FREE

## (undated) DEVICE — GOWN,SIRUS,POLYRNF,SETINSLV,L,20/CS: Brand: MEDLINE

## (undated) DEVICE — BLADE OPHTH 180DEG CUT SURF BLU STR SHRP DBL BVL GRINDLESS

## (undated) DEVICE — SINGLE USE DEVICE INTENDED TO COVER EXPOSED ENDS OF ORTHOPEDIC PIN AND K-WIRES TO HELP PROTECT THE EXPOSED WIRE FROM SNAGGING ON CLOTHING.: Brand: OXBORO™ PIN COVER

## (undated) DEVICE — CHLORAPREP 26ML ORANGE

## (undated) DEVICE — 3M™ TEGADERM™ TRANSPARENT FILM DRESSING FRAME STYLE, 1624W, 2-3/8 IN X 2-3/4 IN (6 CM X 7 CM), 100/CT 4CT/CASE: Brand: 3M™ TEGADERM™

## (undated) DEVICE — BLADE OPHTH 60DEG BLU DBL BVL GRINDLESS SHRP 180DEG CUT

## (undated) DEVICE — 10FR FRAZIER SUCTION HANDLE: Brand: CARDINAL HEALTH

## (undated) DEVICE — COMFO-TEX ELASTIC BANDAGE LATEX FREE, 2INX5YD: Brand: COMFO-TEX™

## (undated) DEVICE — GOWN,SIRUS,NON REINFRCD,LARGE,SET IN SL: Brand: MEDLINE

## (undated) DEVICE — CATHETER IV 20GA L1.25IN PNK FEP SFTY STR HUB RADPQ DISP

## (undated) DEVICE — CORD ES L12FT BPLR FRCP

## (undated) DEVICE — DRAPE EQUIP C ARM MINI 10000100] TIDI PRODUCTS INC]

## (undated) DEVICE — GLOVE,SURG,SENSICARE,ALOE,LF,PF,7: Brand: MEDLINE

## (undated) DEVICE — FORCEPS ENDOSCP BX L230CM DIA28MM ALGTR CUP SPEC RETRV GI

## (undated) DEVICE — SPLINT ORTH W3XL12IN LAYERED FBRGLS FOAM PD BRTH BK MOLD

## (undated) DEVICE — MEDI-VAC NON-CONDUCTIVE SUCTION TUBING: Brand: CARDINAL HEALTH

## (undated) DEVICE — SET GRAV VENT NVENT CK VLV 3 NDL FREE PRT 10 GTT

## (undated) DEVICE — GLOVE SURG SZ 75 L12IN FNGR THK94MIL STD WHT LTX FREE

## (undated) DEVICE — GLOVE,SURG,SENSICARE,ALOE,LF,PF,6: Brand: MEDLINE

## (undated) DEVICE — SET ADMIN PRIMING 7ML L30IN 7.35LB 20 GTT 2ND RLER CLMP